# Patient Record
Sex: MALE | Race: WHITE | Employment: FULL TIME | URBAN - METROPOLITAN AREA
[De-identification: names, ages, dates, MRNs, and addresses within clinical notes are randomized per-mention and may not be internally consistent; named-entity substitution may affect disease eponyms.]

---

## 2017-02-02 ENCOUNTER — TELEPHONE (OUTPATIENT)
Dept: FAMILY MEDICINE CLINIC | Facility: CLINIC | Age: 42
End: 2017-02-02

## 2017-02-02 DIAGNOSIS — G47.33 OSA ON CPAP: Primary | ICD-10-CM

## 2017-02-02 DIAGNOSIS — Z99.89 OSA ON CPAP: Primary | ICD-10-CM

## 2017-02-16 ENCOUNTER — TELEPHONE (OUTPATIENT)
Dept: FAMILY MEDICINE CLINIC | Facility: CLINIC | Age: 42
End: 2017-02-16

## 2017-02-16 NOTE — TELEPHONE ENCOUNTER
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

## 2017-02-17 NOTE — TELEPHONE ENCOUNTER
Spoke with Jamin Scherer, she reports that she an pt have just moved into a new house and it is very dry, pt with red , dry areas on legs. Advised used of Aquaphor to areas, use humidifier in house.  Shower only once a day and use mild soap and warm, not hot clarita

## 2017-02-20 ENCOUNTER — APPOINTMENT (OUTPATIENT)
Dept: LAB | Age: 42
End: 2017-02-20
Attending: FAMILY MEDICINE
Payer: COMMERCIAL

## 2017-02-20 DIAGNOSIS — E03.9 HYPOTHYROIDISM (ACQUIRED): ICD-10-CM

## 2017-02-20 LAB
FREE T4: 1.2 NG/DL (ref 0.9–1.8)
TSI SER-ACNC: 1.93 MIU/ML (ref 0.35–5.5)

## 2017-02-20 PROCEDURE — 84443 ASSAY THYROID STIM HORMONE: CPT

## 2017-02-20 PROCEDURE — 84439 ASSAY OF FREE THYROXINE: CPT

## 2017-02-20 PROCEDURE — 36415 COLL VENOUS BLD VENIPUNCTURE: CPT

## 2017-03-06 ENCOUNTER — OFFICE VISIT (OUTPATIENT)
Dept: FAMILY MEDICINE CLINIC | Facility: CLINIC | Age: 42
End: 2017-03-06

## 2017-03-06 VITALS
DIASTOLIC BLOOD PRESSURE: 50 MMHG | SYSTOLIC BLOOD PRESSURE: 116 MMHG | HEART RATE: 79 BPM | BODY MASS INDEX: 37 KG/M2 | OXYGEN SATURATION: 98 % | RESPIRATION RATE: 16 BRPM | WEIGHT: 277 LBS | TEMPERATURE: 98 F

## 2017-03-06 DIAGNOSIS — M54.50 ACUTE BILATERAL LOW BACK PAIN WITHOUT SCIATICA: Primary | ICD-10-CM

## 2017-03-06 DIAGNOSIS — IMO0001 OBESITY, CLASS II, BMI 35-39.9, WITH COMORBIDITY: ICD-10-CM

## 2017-03-06 PROCEDURE — 99214 OFFICE O/P EST MOD 30 MIN: CPT | Performed by: FAMILY MEDICINE

## 2017-03-06 RX ORDER — CYCLOBENZAPRINE HCL 10 MG
10 TABLET ORAL NIGHTLY PRN
Qty: 14 TABLET | Refills: 0 | Status: SHIPPED | OUTPATIENT
Start: 2017-03-06 | End: 2019-04-01 | Stop reason: ALTCHOICE

## 2017-03-06 RX ORDER — METHYLPREDNISOLONE 4 MG/1
TABLET ORAL
Qty: 21 TABLET | Refills: 0 | Status: SHIPPED | OUTPATIENT
Start: 2017-03-06 | End: 2017-09-25 | Stop reason: ALTCHOICE

## 2017-03-06 RX ORDER — TRAMADOL HYDROCHLORIDE 50 MG/1
50 TABLET ORAL EVERY 6 HOURS PRN
Qty: 28 TABLET | Refills: 1 | Status: SHIPPED | OUTPATIENT
Start: 2017-03-06 | End: 2017-03-13

## 2017-03-06 NOTE — PROGRESS NOTES
CHIEF COMPLAINT: Patient presents with:  Back Pain: muscle spasms in back, pain while sitting     HPI:     Meri Pina is a 42year old male presents for evaluation of lumbar back pain that started 2 days ago while sitting at his computer.   Patient state Melanoma Maternal Uncle    • Melanoma Maternal Uncle    • Aneurysm[Other] [OTHER] Paternal Aunt      Cause of death   • Asthma Sister    • Prostate Cancer     • Uterine Cancer        Social History:   Smoking Status: Former Smoker                   Packs/D MCG/ACT Nasal Suspension 2 sprays by Each Nare route daily. Disp: 1 Bottle Rfl: 12   Nebulizer Does not apply Misc Use every 4 hours with albuterol as needed.  Disp #1DX J45.40 Disp: 1 each Rfl: 0       Allergies:    Dust                      Naprosyn [Napr daily  Recheck in 2 weeks if not better then we will recommend PT and possibly imaging  - Cyclobenzaprine HCl 10 MG Oral Tab; Take 1 tablet (10 mg total) by mouth nightly as needed for Muscle spasms. Dispense: 14 tablet;  Refill: 0  - methylPREDNISolone 4 11/13/1975  Diabetes Care Dilated Eye Exam due on 11/13/1974  Annual Physical due on 06/01/2016  Diabetes Care A1C due on 11/13/2016  LDL Control due on 11/13/2016  Asthma Control Test due on 12/17/2017  Asthma Action Plan due on 12/19/2017  Influenza Vacc

## 2017-03-06 NOTE — PATIENT INSTRUCTIONS
Use MY fitness pal shahnaz by underarmor (free one) and track all calores  -limit 70g carbohydrate daily  Limit 1800 calories daily  -Evaluated regular meals which are high in carbohydrate with poor nutrition and lack of vegetables  -Needs exercise 5 times wee Rx flexeril for muscle spasm at bedtime as needed for pain to help sleep. This medication causes sedation. No driving for 8 hours after ingestion. Do not operate machinery or drive or consume alcohol while taking this medication.    You may use ice or mois Standing hamstring stretch: Place the heel of your leg on a stool about 15 inches high. Keep your knee straight. Lean forward, bending at the hips until you feel a mild stretch in the back of your thigh.  Make sure you do not roll your shoulders and bend at Gluteal stretch: Lying on your back with both knees bent, rest the ankle of one leg over the knee of your other leg.  Grasp the thigh of the bottom leg and pull that knee toward your chest. You will feel a stretch along the buttocks and possibly along the o volleyball   handball   weight lifting   trampoline   tobogganing   sledding   snowmobiling   ice hockey. 4 Steps for Eating Healthier  Changing the way you eat can improve your health.  It can lower your cholesterol and blood pressure, and help you s · Boost your diet with whole grains. Go for oats, whole-grain rice, and bran. · Add beans and lentils to your meals. · Drink more water to match your fiber increase. This is to help prevent constipation.   Date Last Reviewed: 5/11/2015  © 4076-6390 The [address] Fact: This seems like it should be true, but it’s not. When you eat too few calories, your body acts as if it’s on a desert Brigid Deleon 1348. It thinks food is scarce, so it slows down your metabolism (how fast you burn calories) to save energy.  By eating too few ca A gram of fat has almost 2.5 times the calories of a gram of protein or carbohydrates. Try to balance your food choices so that only 20% to 35% of your calories comes from total fat.  This means an average of 2½ to 3½ grams of fat for each 100 calories you Excess weight is a major risk factor for heart disease. Losing weight has many benefits including lowering your blood pressure, improving your cholesterol level, and decreasing your risk for diseases such as diabetes and heart disease.  It may help keep you · Aim for 3 to 4 sessions of aerobic exercise a week. Each session should last about 40 minutes and include moderate to vigorous physical activity. · The most important part of the activity is that you break a sweat.  This indicates your heart is working h · Pizza with vegetable toppings and little or no cheese or low-fat cheese   Date Last Reviewed: 5/11/2015  © 2884-2548 The [facility], [address] [place] [address]. All rights reserved.  This information is not intended as a substitu Date Last Reviewed: 5/11/2015  © 5210-7094 The [address] [place] [address], [address] [place], [place] [address] Center[employer]aughn Jaeger. All rights reserved. This information is not intended as a substitute for professional medical care.  Always follow your healthcare professional

## 2017-05-09 ENCOUNTER — TELEPHONE (OUTPATIENT)
Dept: FAMILY MEDICINE CLINIC | Facility: CLINIC | Age: 42
End: 2017-05-09

## 2017-05-09 DIAGNOSIS — G47.33 OSA ON CPAP: Primary | ICD-10-CM

## 2017-05-09 DIAGNOSIS — Z99.89 OSA ON CPAP: Primary | ICD-10-CM

## 2017-05-19 ENCOUNTER — TELEPHONE (OUTPATIENT)
Dept: FAMILY MEDICINE CLINIC | Facility: CLINIC | Age: 42
End: 2017-05-19

## 2017-05-19 NOTE — TELEPHONE ENCOUNTER
Called patient and spoke to wife Alina Shanks as permitted on consent. Informed her that patient has not had labs completed for diabetes/prediabetes and patient needs OV. Patient's wife states patient will schedule in 1 month to come in for appt.      No future

## 2017-07-07 ENCOUNTER — PATIENT OUTREACH (OUTPATIENT)
Dept: FAMILY MEDICINE CLINIC | Facility: CLINIC | Age: 42
End: 2017-07-07

## 2017-09-25 ENCOUNTER — OFFICE VISIT (OUTPATIENT)
Dept: FAMILY MEDICINE CLINIC | Facility: CLINIC | Age: 42
End: 2017-09-25

## 2017-09-25 VITALS
HEART RATE: 76 BPM | TEMPERATURE: 98 F | OXYGEN SATURATION: 100 % | RESPIRATION RATE: 17 BRPM | DIASTOLIC BLOOD PRESSURE: 78 MMHG | BODY MASS INDEX: 37.94 KG/M2 | SYSTOLIC BLOOD PRESSURE: 138 MMHG | WEIGHT: 271 LBS | HEIGHT: 71 IN

## 2017-09-25 DIAGNOSIS — E11.69 DYSLIPIDEMIA WITH LOW HIGH DENSITY LIPOPROTEIN (HDL) CHOLESTEROL WITH HYPERTRIGLYCERIDEMIA DUE TO TYPE 2 DIABETES MELLITUS (HCC): ICD-10-CM

## 2017-09-25 DIAGNOSIS — E78.2 DYSLIPIDEMIA WITH LOW HIGH DENSITY LIPOPROTEIN (HDL) CHOLESTEROL WITH HYPERTRIGLYCERIDEMIA DUE TO TYPE 2 DIABETES MELLITUS (HCC): ICD-10-CM

## 2017-09-25 DIAGNOSIS — Z13.21 SCREENING FOR ENDOCRINE, NUTRITIONAL, METABOLIC AND IMMUNITY DISORDER: ICD-10-CM

## 2017-09-25 DIAGNOSIS — Z13.0 SCREENING FOR ENDOCRINE, NUTRITIONAL, METABOLIC AND IMMUNITY DISORDER: ICD-10-CM

## 2017-09-25 DIAGNOSIS — Z13.29 SCREENING FOR ENDOCRINE, NUTRITIONAL, METABOLIC AND IMMUNITY DISORDER: ICD-10-CM

## 2017-09-25 DIAGNOSIS — Z13.228 SCREENING FOR ENDOCRINE, NUTRITIONAL, METABOLIC AND IMMUNITY DISORDER: ICD-10-CM

## 2017-09-25 DIAGNOSIS — J45.40 MODERATE PERSISTENT ASTHMA WITHOUT COMPLICATION: ICD-10-CM

## 2017-09-25 DIAGNOSIS — Z13.0 SCREENING FOR IRON DEFICIENCY ANEMIA: Primary | ICD-10-CM

## 2017-09-25 DIAGNOSIS — Z00.00 ANNUAL PHYSICAL EXAM: ICD-10-CM

## 2017-09-25 PROCEDURE — 90471 IMMUNIZATION ADMIN: CPT | Performed by: FAMILY MEDICINE

## 2017-09-25 PROCEDURE — 99396 PREV VISIT EST AGE 40-64: CPT | Performed by: FAMILY MEDICINE

## 2017-09-25 PROCEDURE — 90686 IIV4 VACC NO PRSV 0.5 ML IM: CPT | Performed by: FAMILY MEDICINE

## 2017-09-25 RX ORDER — CETIRIZINE HYDROCHLORIDE 10 MG/1
10 TABLET ORAL DAILY
Qty: 90 TABLET | Refills: 4 | Status: SHIPPED | OUTPATIENT
Start: 2017-09-25 | End: 2018-09-25

## 2017-09-25 RX ORDER — ALBUTEROL SULFATE 90 UG/1
AEROSOL, METERED RESPIRATORY (INHALATION)
Qty: 25.5 G | Refills: 2 | Status: SHIPPED | OUTPATIENT
Start: 2017-09-25 | End: 2017-10-20

## 2017-09-25 NOTE — PATIENT INSTRUCTIONS
Follow-up in 1 month to recheck asthma and discuss lab work. Outside diagnosis from other medical institution shows diagnosis of diabetes.   Perform labs fasting 8 hours with water or black coffee or or black tea diet  soda only prior to exam.    -Encourag Inside the lungs there are branching airways made of stretchy tissue. Each airway is wrapped with bands of muscle. The airways are more narrow as they go deeper into the lungs. The smallest airways end in clusters of tiny balloon-like air sacs (alveoli).  Margie Hansen · Coughing, especially at night  · Getting tired or out of breath easily  · Wheezing  · Chest tightness  · Faster breathing when at rest  Severe flare-ups   Severe flare-ups are life-threatening. They can cause brain damage or death.  In a severe flare-up, Triggers irritate your lungs and lead to asthma flare-ups. Some examples are:  · Irritants, such as tobacco smoke or air pollution. These are a concern for all people with asthma.   · Allergens or substances that cause allergies, like pets, dust mites, or p Exposing a person to gradually larger amounts of an allergen can help the body build up a tolerance. This is the purpose of allergy shots (immunotherapy). For this therapy, injections are given over a period of years.  At first, you get injections with tino mills Cheddar cheese   205 mg/1 oz.   Navy beans, cooked   79 mg/1/2 cup   Kale   90 mg/1/2 cup   Ice cream strawberry   79 mg/1/2 cup           Orange, navel   56 mg/1 medium   Note: Calcium levels may vary depending on brand and size.   Daily calcium needs  14- What is this test?  Vitamin D is mainly found in fortified dairy foods, juice, breakfast cereal, and certain fish. This vitamin plays many roles in the body.  But because it helps the body absorb calcium from foods and supplements, it's particularly importa A result for a lab test may be affected by many things, including the method the laboratory uses to do the test. If your test results are different from the normal value, you may not have a problem.  To learn what the results mean for you, talk with your he Date Last Reviewed: 10/12/2015  © 2501-1391 The [address] [place] [address], [address] [place], [place] [address] Jaeger. All rights reserved. This information is not intended as a substitute for professional medical care.  Always follow your healthcare [employer] All men in this age group Every 2 to 4 years if no risk factors for eye disease2   Vaccine Who needs it How often   Chickenpox (varicella) All men in this age group who have no record of this infection or vaccine 2 doses; the second dose should be g Use of daily aspirin Men ages 39 to 78 at risk for cardiovascular health problems At routine exams   Use of tobacco and the health affects it can cause All men in this age group Every exam   1National Comprehensive Cancer [facility]

## 2017-09-25 NOTE — PROGRESS NOTES
REASON FOR VISIT:    Suyapa Bowman is a 42year old male who presents for an [address]. Using albuterol 2 x week needs albuterol with dust exposure due to cough and chest tightness with relief.  Denies cough, chest tightness, sob, wheezi the lungs every 6 (six) hours as needed for Wheezing or Shortness of Breath (If no relief go to ER). Disp: 18 g Rfl: 0   Montelukast Sodium (SINGULAIR) 10 MG Oral Tab Take 1 tablet (10 mg total) by mouth nightly.  Disp: 90 tablet Rfl: 2   Fluticasone [facility] maintain positive mental well-being?: Social Interaction    How would you describe your daily physical activity?: Light    If you are a male age 38-65 or a female age 47-67, do you take aspirin?: No    Have you had any immunizations at another office such GLUCOSE (mg/dL)   Date Value   11/13/2015 80         Gonorrhea Screening if high risk No results found for: GONOCOCCUS    HIV Screening For all adults age 22-65, older adults at increased risk No results found for: HIV    Syphilis Screening Screen if p total) by mouth once daily. Disp: 90 tablet Rfl: 1   Levothyroxine Sodium 200 MCG Oral Tab Take 1 tablet (200 mcg total) by mouth once daily.  Disp: 90 tablet Rfl: 1   Albuterol Sulfate  (90 BASE) MCG/ACT Inhalation Aero Soln Inhale 2 puffs into the Melanoma Maternal Grandmother    • Heart Disease Maternal Grandfather    • Melanoma Maternal Uncle    • Melanoma Maternal Uncle    • Melanoma Maternal Uncle    • Aneurysm [OTHER] Paternal Aunt      Cause of death   • Asthma Sister    • Prostate Cancer Othe daughter in room.    RECTAL:declined  MUSCULOSKELETAL: back is not tender, FROM of the back  EXTREMITIES: no cyanosis, clubbing or edema  NEURO: Oriented times three, cranial nerves are intact, motor and sensory are grossly intact    ASSESSMENT AND OTHER RE  or unmarried couples  - UTD immunizations -titers MMR  -Vitamin D3 2000 units daily recommended  -Needs 1000 mg of calcium daily for osteoporosis prevention discussed  - Albuterol Sulfate HFA (PROAIR HFA) 108 (90 Base) MCG/ACT Inhalation Aero Soln;

## 2017-10-01 DIAGNOSIS — E03.9 HYPOTHYROIDISM (ACQUIRED): ICD-10-CM

## 2017-10-02 RX ORDER — LEVOTHYROXINE SODIUM 0.05 MG/1
TABLET ORAL
Qty: 90 TABLET | Refills: 0 | Status: SHIPPED | OUTPATIENT
Start: 2017-10-02 | End: 2017-10-20

## 2017-10-02 NOTE — TELEPHONE ENCOUNTER
Pt requesting refill on Levothyroxine, protocol passed, refill approved    LOV 9/25/17    LF 2/20/17  #90 w/1    Future Appointments  Date Time Provider Caroline Evans   10/23/2017 4:40 PM Edinson Mckeon DO [facility] [place]

## 2017-10-06 DIAGNOSIS — J45.40 MODERATE PERSISTENT ASTHMA WITHOUT COMPLICATION: ICD-10-CM

## 2017-10-06 DIAGNOSIS — J30.1 ALLERGIC RHINITIS DUE TO POLLEN: ICD-10-CM

## 2017-10-09 RX ORDER — MONTELUKAST SODIUM 10 MG/1
TABLET ORAL
Qty: 90 TABLET | Refills: 0 | Status: SHIPPED | OUTPATIENT
Start: 2017-10-09 | End: 2017-10-20

## 2017-10-20 ENCOUNTER — TELEPHONE (OUTPATIENT)
Dept: FAMILY MEDICINE CLINIC | Facility: CLINIC | Age: 42
End: 2017-10-20

## 2017-10-20 DIAGNOSIS — J45.40 MODERATE PERSISTENT ASTHMA WITHOUT COMPLICATION: ICD-10-CM

## 2017-10-20 DIAGNOSIS — E03.9 HYPOTHYROIDISM (ACQUIRED): ICD-10-CM

## 2017-10-20 DIAGNOSIS — J30.1 ALLERGIC RHINITIS DUE TO POLLEN, UNSPECIFIED CHRONICITY, UNSPECIFIED SEASONALITY: ICD-10-CM

## 2017-10-20 DIAGNOSIS — Z00.00 ANNUAL PHYSICAL EXAM: ICD-10-CM

## 2017-10-20 RX ORDER — LEVOTHYROXINE SODIUM 0.05 MG/1
50 TABLET ORAL
Qty: 90 TABLET | Refills: 0 | Status: SHIPPED | OUTPATIENT
Start: 2017-10-20 | End: 2018-02-20

## 2017-10-20 RX ORDER — MONTELUKAST SODIUM 10 MG/1
TABLET ORAL
Qty: 90 TABLET | Refills: 0 | Status: SHIPPED | OUTPATIENT
Start: 2017-10-20 | End: 2018-05-23

## 2017-10-20 RX ORDER — LEVOTHYROXINE SODIUM 0.2 MG/1
200 TABLET ORAL
Qty: 90 TABLET | Refills: 0 | Status: SHIPPED | OUTPATIENT
Start: 2017-10-20 | End: 2018-02-20

## 2017-10-20 RX ORDER — ALBUTEROL SULFATE 90 UG/1
AEROSOL, METERED RESPIRATORY (INHALATION)
Qty: 25.5 G | Refills: 2 | Status: SHIPPED | OUTPATIENT
Start: 2017-10-20 | End: 2018-10-09

## 2017-10-20 NOTE — TELEPHONE ENCOUNTER
Spoke with Wife John Boswell regarding having labs done, pt will complete before next scheduled appointment    Wife also requests [facility]    Refills pended

## 2017-10-20 NOTE — TELEPHONE ENCOUNTER
Eduardo Shown from [facility] calling, wants to confirm patiens thyroid dose. OV from 9/19/2016 has pt on 250 mcg of levothyroxine daily.      Future Appointments  Date Time Provider Caroline Evans   11/13/2017 4:40 PM Chad Feliciano, DO [facility] [place]

## 2017-10-23 ENCOUNTER — TELEPHONE (OUTPATIENT)
Dept: FAMILY MEDICINE CLINIC | Facility: CLINIC | Age: 42
End: 2017-10-23

## 2017-10-23 DIAGNOSIS — G47.33 OSA ON CPAP: Primary | ICD-10-CM

## 2017-10-23 DIAGNOSIS — J45.40 MODERATE PERSISTENT ASTHMA WITHOUT COMPLICATION: ICD-10-CM

## 2017-10-23 DIAGNOSIS — Z99.89 OSA ON CPAP: Primary | ICD-10-CM

## 2017-10-23 NOTE — TELEPHONE ENCOUNTER
Obstructive sleep apnea and needs consultation and review of his CPAP. Referral improved. Seen recently with asthma.

## 2017-11-04 ENCOUNTER — LAB ENCOUNTER (OUTPATIENT)
Dept: LAB | Facility: HOSPITAL | Age: 42
End: 2017-11-04
Attending: FAMILY MEDICINE
Payer: COMMERCIAL

## 2017-11-04 DIAGNOSIS — Z00.00 ANNUAL PHYSICAL EXAM: ICD-10-CM

## 2017-11-04 DIAGNOSIS — Z13.0 SCREENING FOR ENDOCRINE, NUTRITIONAL, METABOLIC AND IMMUNITY DISORDER: ICD-10-CM

## 2017-11-04 DIAGNOSIS — E03.9 HYPOTHYROIDISM (ACQUIRED): ICD-10-CM

## 2017-11-04 DIAGNOSIS — E78.2 DYSLIPIDEMIA WITH LOW HIGH DENSITY LIPOPROTEIN (HDL) CHOLESTEROL WITH HYPERTRIGLYCERIDEMIA DUE TO TYPE 2 DIABETES MELLITUS (HCC): ICD-10-CM

## 2017-11-04 DIAGNOSIS — Z13.0 SCREENING FOR IRON DEFICIENCY ANEMIA: ICD-10-CM

## 2017-11-04 DIAGNOSIS — Z13.29 SCREENING FOR ENDOCRINE, NUTRITIONAL, METABOLIC AND IMMUNITY DISORDER: ICD-10-CM

## 2017-11-04 DIAGNOSIS — Z13.228 SCREENING FOR ENDOCRINE, NUTRITIONAL, METABOLIC AND IMMUNITY DISORDER: ICD-10-CM

## 2017-11-04 DIAGNOSIS — E11.69 DYSLIPIDEMIA WITH LOW HIGH DENSITY LIPOPROTEIN (HDL) CHOLESTEROL WITH HYPERTRIGLYCERIDEMIA DUE TO TYPE 2 DIABETES MELLITUS (HCC): ICD-10-CM

## 2017-11-04 DIAGNOSIS — Z13.21 SCREENING FOR ENDOCRINE, NUTRITIONAL, METABOLIC AND IMMUNITY DISORDER: ICD-10-CM

## 2017-11-04 PROCEDURE — 86762 RUBELLA ANTIBODY: CPT

## 2017-11-04 PROCEDURE — 36415 COLL VENOUS BLD VENIPUNCTURE: CPT

## 2017-11-04 PROCEDURE — 84443 ASSAY THYROID STIM HORMONE: CPT

## 2017-11-04 PROCEDURE — 86787 VARICELLA-ZOSTER ANTIBODY: CPT

## 2017-11-04 PROCEDURE — 82306 VITAMIN D 25 HYDROXY: CPT

## 2017-11-04 PROCEDURE — 84439 ASSAY OF FREE THYROXINE: CPT

## 2017-11-04 PROCEDURE — 86765 RUBEOLA ANTIBODY: CPT

## 2017-11-04 PROCEDURE — 85025 COMPLETE CBC W/AUTO DIFF WBC: CPT

## 2017-11-04 PROCEDURE — 80053 COMPREHEN METABOLIC PANEL: CPT

## 2017-11-04 PROCEDURE — 86735 MUMPS ANTIBODY: CPT

## 2017-11-04 PROCEDURE — 83036 HEMOGLOBIN GLYCOSYLATED A1C: CPT

## 2017-11-04 PROCEDURE — 80061 LIPID PANEL: CPT

## 2017-11-09 NOTE — PROGRESS NOTES
Immune to mumps, measles, and rubella and chicken pox. notified mychart. Fu OV as directed at last visit.

## 2017-11-13 ENCOUNTER — OFFICE VISIT (OUTPATIENT)
Dept: FAMILY MEDICINE CLINIC | Facility: CLINIC | Age: 42
End: 2017-11-13

## 2017-11-13 VITALS
HEART RATE: 66 BPM | WEIGHT: 276.38 LBS | BODY MASS INDEX: 39 KG/M2 | OXYGEN SATURATION: 99 % | RESPIRATION RATE: 18 BRPM | DIASTOLIC BLOOD PRESSURE: 72 MMHG | SYSTOLIC BLOOD PRESSURE: 124 MMHG | TEMPERATURE: 98 F

## 2017-11-13 DIAGNOSIS — E55.9 VITAMIN D DEFICIENCY: ICD-10-CM

## 2017-11-13 DIAGNOSIS — E78.2 MIXED HYPERLIPIDEMIA: Primary | ICD-10-CM

## 2017-11-13 DIAGNOSIS — R73.01 IFG (IMPAIRED FASTING GLUCOSE): ICD-10-CM

## 2017-11-13 DIAGNOSIS — J45.40 MODERATE PERSISTENT ASTHMA WITHOUT COMPLICATION: ICD-10-CM

## 2017-11-13 DIAGNOSIS — E03.9 HYPOTHYROIDISM (ACQUIRED): ICD-10-CM

## 2017-11-13 PROCEDURE — 99214 OFFICE O/P EST MOD 30 MIN: CPT | Performed by: FAMILY MEDICINE

## 2017-11-13 RX ORDER — ATORVASTATIN CALCIUM 10 MG/1
10 TABLET, FILM COATED ORAL NIGHTLY
Qty: 30 TABLET | Refills: 2 | Status: ON HOLD | OUTPATIENT
Start: 2017-11-13 | End: 2018-08-12 | Stop reason: ALTCHOICE

## 2017-11-13 NOTE — PATIENT INSTRUCTIONS
-Buy over-the-counter vitamin D3  2000 IUs take 2 tablets daily ×12 weeks then 1 tablet daily. Recheck in12 months. Paco's- [employer] brand 600 tabs for $12  Lipid panel is  Elevated. - Please change diet  start exercising.   Start exercise to 3-5 times a w · HDL (high-density lipoprotein) is known as \"good cholesterol. \" This protein shell collects excess cholesterol that LDLs have left behind on blood vessel walls.  That's why high levels of HDL cholesterol can decrease your risk of heart disease and stroke Take medicine as directed  Many people need medicine to get their LDL levels to a safe level. Medicine to lower cholesterol levels is effective and safe. Taking medicine is not a substitute for exercise or watching your diet!  Your healthcare provider can t · Cream, ice cream, sour cream, cheese, and butter, and foods made with them  · Sauces made with butter or cream  · Salad dressings with saturated fats  · Foods that contain palm or coconut oil  Choose unsaturated fats  Unsaturated fats are usually liquid Luckily, most foods now have labels giving you the facts about what you’re eating. Reading food labels helps you make healthy choices. Look for the words highlighted below. · Serving Size. This is the amount of food in 1 serving.  If you eat larger portion If you haven’t been exercising regularly,  get your doctor’s OK first. Then start slowly.  Here are some tips:  · Begin exercising 3 times a week for 5 to 10 minutes at a time. · When you feel comfortable, add a few minutes each session.   · Slowly build u

## 2017-11-14 ENCOUNTER — TELEPHONE (OUTPATIENT)
Dept: FAMILY MEDICINE CLINIC | Facility: CLINIC | Age: 42
End: 2017-11-14

## 2017-11-14 NOTE — TELEPHONE ENCOUNTER
Pts wife called wanting to speak to nurse in regards to husbands medication. Jazzyphilomena Cantu Stated that she is concerned with the medication prescribed for pts cholesterol, she wants to speak to nurse to further discuss alternatives.     [facility] [phone]

## 2017-11-14 NOTE — TELEPHONE ENCOUNTER
Spoke with wife, She is concerned about her  taking a statin. Discussed benefits and side effects with wife. Wife states that pt may try diet and exercise before starting medication. Advised wife to have pt follow up as directed.  Wife agrees to this

## 2017-11-17 ENCOUNTER — TELEPHONE (OUTPATIENT)
Dept: FAMILY MEDICINE CLINIC | Facility: CLINIC | Age: 42
End: 2017-11-17

## 2017-11-17 NOTE — TELEPHONE ENCOUNTER
Patient signed medical records authorization form for the below Facility to disclose health information to [facility]:     Facility / Provider Name: [facility] Address:  Facility Phone: [phone] Fax: [phone]      Medical Records A

## 2018-02-20 DIAGNOSIS — E03.9 HYPOTHYROIDISM (ACQUIRED): ICD-10-CM

## 2018-02-20 RX ORDER — LEVOTHYROXINE SODIUM 0.05 MG/1
50 TABLET ORAL
Qty: 90 TABLET | Refills: 0 | Status: SHIPPED | OUTPATIENT
Start: 2018-02-20 | End: 2018-07-03

## 2018-02-20 RX ORDER — LEVOTHYROXINE SODIUM 0.2 MG/1
TABLET ORAL
Qty: 90 TABLET | Refills: 0 | Status: SHIPPED | OUTPATIENT
Start: 2018-02-20 | End: 2018-07-03

## 2018-03-26 ENCOUNTER — TELEPHONE (OUTPATIENT)
Dept: FAMILY MEDICINE CLINIC | Facility: CLINIC | Age: 43
End: 2018-03-26

## 2018-03-26 NOTE — TELEPHONE ENCOUNTER
Spoke with wife Delia Sadleree. Instructed her to obtain RX for Nebulizer from [place] or may purchase at Pharmacy.  Delia Araya verbalized understanding

## 2018-05-23 DIAGNOSIS — J45.40 MODERATE PERSISTENT ASTHMA WITHOUT COMPLICATION: ICD-10-CM

## 2018-05-23 DIAGNOSIS — J30.1 ALLERGIC RHINITIS DUE TO POLLEN: ICD-10-CM

## 2018-05-23 RX ORDER — MONTELUKAST SODIUM 10 MG/1
TABLET ORAL
Qty: 90 TABLET | Refills: 0 | Status: SHIPPED | OUTPATIENT
Start: 2018-05-23 | End: 2018-10-09

## 2018-05-23 NOTE — TELEPHONE ENCOUNTER
Pt requesting refill of Montelukast, protocol failed, unable to approve:     Last Time Medication was Filled:  10/20/17  Last Office Visit with PCP: 11/13/17    No future appointments.

## 2018-05-25 NOTE — PROGRESS NOTES
No evidence impaired Fasting glucose. Prior blood glucose level was nonfasting and patient had drank lemonade just prior to the study. Normal A1c for past several years.   Will remove from diabetic list//registry

## 2018-06-06 ENCOUNTER — TELEPHONE (OUTPATIENT)
Dept: FAMILY MEDICINE CLINIC | Facility: CLINIC | Age: 43
End: 2018-06-06

## 2018-06-06 DIAGNOSIS — L91.8 SKIN TAG: Primary | ICD-10-CM

## 2018-07-03 DIAGNOSIS — E03.9 HYPOTHYROIDISM (ACQUIRED): ICD-10-CM

## 2018-07-03 RX ORDER — LEVOTHYROXINE SODIUM 0.2 MG/1
TABLET ORAL
Qty: 90 TABLET | Refills: 0 | Status: SHIPPED | OUTPATIENT
Start: 2018-07-03 | End: 2018-10-09

## 2018-07-03 RX ORDER — LEVOTHYROXINE SODIUM 0.05 MG/1
50 TABLET ORAL
Qty: 90 TABLET | Refills: 0 | Status: SHIPPED | OUTPATIENT
Start: 2018-07-03 | End: 2018-10-09

## 2018-07-03 NOTE — TELEPHONE ENCOUNTER
Pt requesting refill on Levothyroxine, protocol passed, refill approved    LOV 11/13/17    LF 2/20/18    Future Appointments  Date Time Provider Caroline Evans   7/21/2018 8:00 AM Timbo Paul MD [facility]

## 2018-08-12 ENCOUNTER — HOSPITAL ENCOUNTER (OUTPATIENT)
Facility: HOSPITAL | Age: 43
Setting detail: OBSERVATION
Discharge: HOME | End: 2018-08-13
Attending: EMERGENCY MEDICINE | Admitting: HOSPITALIST
Payer: COMMERCIAL

## 2018-08-12 ENCOUNTER — APPOINTMENT (OUTPATIENT)
Dept: CT IMAGING | Facility: HOSPITAL | Age: 43
End: 2018-08-12
Attending: EMERGENCY MEDICINE
Payer: COMMERCIAL

## 2018-08-12 DIAGNOSIS — N23 RENAL COLIC: Primary | ICD-10-CM

## 2018-08-12 LAB
ALBUMIN SERPL-MCNC: 4 G/DL (ref 3.5–4.8)
ALBUMIN/GLOB SERPL: 1 {RATIO} (ref 1–2)
ALP LIVER SERPL-CCNC: 94 U/L (ref 45–117)
ALT SERPL-CCNC: 43 U/L (ref 17–63)
ANION GAP SERPL CALC-SCNC: 6 MMOL/L (ref 0–18)
BASOPHILS # BLD AUTO: 0.07 X10(3) UL (ref 0–0.1)
BASOPHILS NFR BLD AUTO: 0.7 %
BILIRUB SERPL-MCNC: 0.5 MG/DL (ref 0.1–2)
BILIRUB UR QL STRIP.AUTO: NEGATIVE
BUN BLD-MCNC: 19 MG/DL (ref 8–20)
BUN/CREAT SERPL: 15.2 (ref 10–20)
CALCIUM BLD-MCNC: 8.8 MG/DL (ref 8.3–10.3)
CHLORIDE SERPL-SCNC: 110 MMOL/L (ref 101–111)
CO2 SERPL-SCNC: 22 MMOL/L (ref 22–32)
COLOR UR AUTO: YELLOW
CREAT BLD-MCNC: 1.25 MG/DL (ref 0.7–1.3)
EOSINOPHIL # BLD AUTO: 0.46 X10(3) UL (ref 0–0.3)
EOSINOPHIL NFR BLD AUTO: 4.4 %
ERYTHROCYTE [DISTWIDTH] IN BLOOD BY AUTOMATED COUNT: 13.3 % (ref 11.5–16)
GLOBULIN PLAS-MCNC: 4 G/DL (ref 2.5–3.7)
GLUCOSE BLD-MCNC: 104 MG/DL (ref 70–99)
GLUCOSE UR STRIP.AUTO-MCNC: NEGATIVE MG/DL
HCT VFR BLD AUTO: 48.4 % (ref 37–53)
HGB BLD-MCNC: 16.9 G/DL (ref 13–17)
IMMATURE GRANULOCYTE COUNT: 0.07 X10(3) UL (ref 0–1)
IMMATURE GRANULOCYTE RATIO %: 0.7 %
KETONES UR STRIP.AUTO-MCNC: NEGATIVE MG/DL
LIPASE: 158 U/L (ref 73–393)
LYMPHOCYTES # BLD AUTO: 3.8 X10(3) UL (ref 0.9–4)
LYMPHOCYTES NFR BLD AUTO: 36.2 %
M PROTEIN MFR SERPL ELPH: 8 G/DL (ref 6.1–8.3)
MCH RBC QN AUTO: 30 PG (ref 27–33.2)
MCHC RBC AUTO-ENTMCNC: 34.9 G/DL (ref 31–37)
MCV RBC AUTO: 86 FL (ref 80–99)
MONOCYTES # BLD AUTO: 0.98 X10(3) UL (ref 0.1–1)
MONOCYTES NFR BLD AUTO: 9.3 %
NEUTROPHIL ABS PRELIM: 5.13 X10 (3) UL (ref 1.3–6.7)
NEUTROPHILS # BLD AUTO: 5.13 X10(3) UL (ref 1.3–6.7)
NEUTROPHILS NFR BLD AUTO: 48.7 %
NITRITE UR QL STRIP.AUTO: NEGATIVE
OSMOLALITY SERPL CALC.SUM OF ELEC: 289 MOSM/KG (ref 275–295)
PH UR STRIP.AUTO: 5 [PH] (ref 4.5–8)
PLATELET # BLD AUTO: 227 10(3)UL (ref 150–450)
PROT UR STRIP.AUTO-MCNC: 30 MG/DL
RBC # BLD AUTO: 5.63 X10(6)UL (ref 4.3–5.7)
RBC #/AREA URNS AUTO: >10 /HPF
RED CELL DISTRIBUTION WIDTH-SD: 41.3 FL (ref 35.1–46.3)
SODIUM SERPL-SCNC: 138 MMOL/L (ref 136–144)
SP GR UR STRIP.AUTO: 1.02 (ref 1–1.03)
UROBILINOGEN UR STRIP.AUTO-MCNC: <2 MG/DL
WBC # BLD AUTO: 10.5 X10(3) UL (ref 4–13)

## 2018-08-12 PROCEDURE — 99220 INITIAL OBSERVATION CARE,LEVL III: CPT | Performed by: HOSPITALIST

## 2018-08-12 PROCEDURE — 74176 CT ABD & PELVIS W/O CONTRAST: CPT | Performed by: EMERGENCY MEDICINE

## 2018-08-12 RX ORDER — ONDANSETRON 2 MG/ML
4 INJECTION INTRAMUSCULAR; INTRAVENOUS EVERY 6 HOURS PRN
Status: DISCONTINUED | OUTPATIENT
Start: 2018-08-12 | End: 2018-08-13

## 2018-08-12 RX ORDER — LEVOTHYROXINE SODIUM 0.05 MG/1
50 TABLET ORAL
Status: DISCONTINUED | OUTPATIENT
Start: 2018-08-12 | End: 2018-08-13

## 2018-08-12 RX ORDER — CETIRIZINE HYDROCHLORIDE 10 MG/1
10 TABLET ORAL DAILY
Status: DISCONTINUED | OUTPATIENT
Start: 2018-08-12 | End: 2018-08-13

## 2018-08-12 RX ORDER — ACETAMINOPHEN 325 MG/1
650 TABLET ORAL EVERY 4 HOURS PRN
Status: DISCONTINUED | OUTPATIENT
Start: 2018-08-12 | End: 2018-08-13

## 2018-08-12 RX ORDER — CYCLOBENZAPRINE HCL 10 MG
10 TABLET ORAL NIGHTLY PRN
Status: DISCONTINUED | OUTPATIENT
Start: 2018-08-12 | End: 2018-08-13

## 2018-08-12 RX ORDER — SODIUM CHLORIDE 9 MG/ML
INJECTION, SOLUTION INTRAVENOUS CONTINUOUS
Status: DISCONTINUED | OUTPATIENT
Start: 2018-08-12 | End: 2018-08-13

## 2018-08-12 RX ORDER — ALBUTEROL SULFATE 90 UG/1
2 AEROSOL, METERED RESPIRATORY (INHALATION) EVERY 4 HOURS PRN
Status: DISCONTINUED | OUTPATIENT
Start: 2018-08-12 | End: 2018-08-13

## 2018-08-12 RX ORDER — MORPHINE SULFATE 4 MG/ML
4 INJECTION, SOLUTION INTRAMUSCULAR; INTRAVENOUS ONCE
Status: COMPLETED | OUTPATIENT
Start: 2018-08-12 | End: 2018-08-12

## 2018-08-12 RX ORDER — ONDANSETRON 2 MG/ML
4 INJECTION INTRAMUSCULAR; INTRAVENOUS ONCE
Status: COMPLETED | OUTPATIENT
Start: 2018-08-12 | End: 2018-08-12

## 2018-08-12 RX ORDER — ONDANSETRON 2 MG/ML
4 INJECTION INTRAMUSCULAR; INTRAVENOUS ONCE
Status: DISCONTINUED | OUTPATIENT
Start: 2018-08-12 | End: 2018-08-13

## 2018-08-12 RX ORDER — MORPHINE SULFATE 4 MG/ML
4 INJECTION, SOLUTION INTRAMUSCULAR; INTRAVENOUS EVERY 2 HOUR PRN
Status: DISCONTINUED | OUTPATIENT
Start: 2018-08-12 | End: 2018-08-13

## 2018-08-12 RX ORDER — ACETAMINOPHEN 325 MG/1
650 TABLET ORAL EVERY 6 HOURS PRN
Status: DISCONTINUED | OUTPATIENT
Start: 2018-08-12 | End: 2018-08-13

## 2018-08-12 RX ORDER — KETOROLAC TROMETHAMINE 30 MG/ML
15 INJECTION, SOLUTION INTRAMUSCULAR; INTRAVENOUS ONCE
Status: COMPLETED | OUTPATIENT
Start: 2018-08-12 | End: 2018-08-12

## 2018-08-12 RX ORDER — HYDROCODONE BITARTRATE AND ACETAMINOPHEN 5; 325 MG/1; MG/1
1 TABLET ORAL EVERY 4 HOURS PRN
Status: DISCONTINUED | OUTPATIENT
Start: 2018-08-12 | End: 2018-08-13

## 2018-08-12 RX ORDER — LEVOTHYROXINE SODIUM 0.2 MG/1
200 TABLET ORAL
Status: DISCONTINUED | OUTPATIENT
Start: 2018-08-12 | End: 2018-08-13

## 2018-08-12 RX ORDER — MORPHINE SULFATE 4 MG/ML
2 INJECTION, SOLUTION INTRAMUSCULAR; INTRAVENOUS EVERY 2 HOUR PRN
Status: DISCONTINUED | OUTPATIENT
Start: 2018-08-12 | End: 2018-08-13

## 2018-08-12 RX ORDER — MORPHINE SULFATE 4 MG/ML
INJECTION, SOLUTION INTRAMUSCULAR; INTRAVENOUS
Status: DISPENSED
Start: 2018-08-12 | End: 2018-08-13

## 2018-08-12 RX ORDER — MORPHINE SULFATE 4 MG/ML
INJECTION, SOLUTION INTRAMUSCULAR; INTRAVENOUS
Status: COMPLETED
Start: 2018-08-12 | End: 2018-08-12

## 2018-08-12 RX ORDER — ALFUZOSIN HYDROCHLORIDE 10 MG/1
10 TABLET, EXTENDED RELEASE ORAL
Status: DISCONTINUED | OUTPATIENT
Start: 2018-08-13 | End: 2018-08-13

## 2018-08-12 RX ORDER — MORPHINE SULFATE 4 MG/ML
INJECTION, SOLUTION INTRAMUSCULAR; INTRAVENOUS
Status: DISPENSED
Start: 2018-08-12 | End: 2018-08-12

## 2018-08-12 RX ORDER — MONTELUKAST SODIUM 10 MG/1
10 TABLET ORAL NIGHTLY
Status: DISCONTINUED | OUTPATIENT
Start: 2018-08-12 | End: 2018-08-13

## 2018-08-12 RX ORDER — MORPHINE SULFATE 4 MG/ML
6 INJECTION, SOLUTION INTRAMUSCULAR; INTRAVENOUS ONCE
Status: COMPLETED | OUTPATIENT
Start: 2018-08-12 | End: 2018-08-12

## 2018-08-12 RX ORDER — MORPHINE SULFATE 4 MG/ML
1 INJECTION, SOLUTION INTRAMUSCULAR; INTRAVENOUS EVERY 2 HOUR PRN
Status: DISCONTINUED | OUTPATIENT
Start: 2018-08-12 | End: 2018-08-13

## 2018-08-12 RX ORDER — CALCIUM CARBONATE/VITAMIN D3 600 MG-10
1 TABLET ORAL DAILY
Status: DISCONTINUED | OUTPATIENT
Start: 2018-08-12 | End: 2018-08-12 | Stop reason: RX

## 2018-08-12 RX ORDER — ATORVASTATIN CALCIUM 10 MG/1
10 TABLET, FILM COATED ORAL NIGHTLY
Status: DISCONTINUED | OUTPATIENT
Start: 2018-08-12 | End: 2018-08-13

## 2018-08-12 RX ORDER — ONDANSETRON 2 MG/ML
INJECTION INTRAMUSCULAR; INTRAVENOUS
Status: COMPLETED
Start: 2018-08-12 | End: 2018-08-12

## 2018-08-12 RX ORDER — HYDROMORPHONE HYDROCHLORIDE 1 MG/ML
0.5 INJECTION, SOLUTION INTRAMUSCULAR; INTRAVENOUS; SUBCUTANEOUS ONCE
Status: DISCONTINUED | OUTPATIENT
Start: 2018-08-12 | End: 2018-08-12

## 2018-08-12 RX ORDER — HYDROCODONE BITARTRATE AND ACETAMINOPHEN 5; 325 MG/1; MG/1
2 TABLET ORAL EVERY 4 HOURS PRN
Status: DISCONTINUED | OUTPATIENT
Start: 2018-08-12 | End: 2018-08-13

## 2018-08-12 RX ORDER — METOCLOPRAMIDE HYDROCHLORIDE 5 MG/ML
10 INJECTION INTRAMUSCULAR; INTRAVENOUS EVERY 8 HOURS PRN
Status: DISCONTINUED | OUTPATIENT
Start: 2018-08-12 | End: 2018-08-13

## 2018-08-12 NOTE — ED INITIAL ASSESSMENT (HPI)
Pt here with left flank pain with radiation to left groin. +vomited x 2 at home. Pt stating he had a kidney stone 3 weeks ago on the opposite side.  +diaphoretic

## 2018-08-12 NOTE — H&P
[facility]  History and Physical     L[facility] Patient Status:  Emergency    1975 MRN [id number]   Location [address] Attending Lee Hurd MD   [facility] Day # 0 PCP Tl Driscoll DO     Chief Complaint: flank Melanoma Maternal Uncle    • Melanoma Maternal Uncle    • Aneurysm [OTHER] Paternal Aunt      Cause of death   • Asthma Sister    • Prostate Cancer Other    • Uterine Cancer Other        Allergies:   Dust                      Naprosyn [Naproxen]     NAUSEA Disp #1DX J45.40 Disp: 1 each Rfl: 0   Omega 3 1200 MG Oral Cap Take 1 capsule by mouth daily. Disp: 90 capsule Rfl: 3       Review of Systems:   A comprehensive 14 point review of systems was completed.     Pertinent positives and negatives noted in the HP SCD  · CODE status: full  · Gonzalez: no    Plan of care discussed with ED physician    Ray Horne MD  8/12/2018

## 2018-08-12 NOTE — ED PROVIDER NOTES
Patient Seen in: [facility] Emergency Department    History   Patient presents with:  Abdomen/Flank Pain (GI/)    Stated Complaint: flank pain    HPI    65-year-old male here for severe left flank pain that started a little prior to arrival.  It i Normocephalic and atraumatic. Nose: Nose normal.   Eyes: EOM are normal. Pupils are equal, round, and reactive to light. Neck: Normal range of motion. Neck supple. No JVD present. Cardiovascular: Normal rate and regular rhythm.     Pulmonary/Chest: Ef (60) 1988 7496  ------------------------------------------------------------      [facility]     Admission disposition: 8/12/2018  2:10 PM         Ct Abdomen+pelvis Kidneystone 2d Rndr(no Iv,no Oral)(cpt=74176)    Result Date: 8/12/2018  CONCLUSION:  3.5 mm obstructing monster

## 2018-08-12 NOTE — CONSULTS
[facility]  Report of Consultation    Kelly Manfredsary Patient Status:  Observation    1975 MRN [id number]   [facility] 3NW-A Attending Marino Grimes MD   Hosp Day # 0 PCP Nick Egan DO     Impression and Plan:  Left, distal [id number]  Exp 12/18 ) Disp: 1 each Rfl: 3   cetirizine 10 MG Oral Tab Take 1 tablet (10 mg total) by mouth daily. Disp: 90 tablet Rfl: 4   Omega 3 1200 MG Oral Cap Take 1 capsule by mouth daily.  Disp: 90 capsule Rfl: 3         Current Facility-Administered age 14    LOC for 1 hour   • Current every day smoker    • GERD (gastroesophageal reflux disease)     EGD 2014 +Duodenitis, by Dr. Ling Crawley    • Hyperlipidemia    • Hypogonadism male     low testosterone.  MRI pituitary normal. symptomatic   • Hypothyroidism Pulse 59   Temp 98 °F (36.7 °C) (Oral)   Resp 20   Ht 6' 1\" (1.854 m)   Wt 270 lb (122.5 kg)   SpO2 98%   BMI 35.62 kg/m²   General: Alert, orientated x3. Cooperative. No apparent distress. HEENT: Exam is unremarkable.    Lungs: Clear to auscultation b

## 2018-08-12 NOTE — PROGRESS NOTES
Pharmacy Note: Dietary Supplement Discontinuation Per Policy    Omega 3 has been discontinued on [facility] per policy. This supplement may be restarted upon discharge using the medication reconciliation process.     Thank you,   Tye Little, PharmD  8

## 2018-08-12 NOTE — PROGRESS NOTES
[facility] Pharm[facility] Note: Antimicrobial Weight Dose Adjustment for: ceftriaxone (ROCEPHIN)    Colleen Larkin is a 36year old male who has been prescribed ceftriaxone (ROCEPHIN) 1000 mg every 24 hours.   CrCl is estimated creatinine clearance is 86.1 mL/min (based

## 2018-08-12 NOTE — ED NOTES
Pt c/o of severe pain to left flank area radiating to LLQ pt unable to sit on the stretcher stating the pain was fine 20 min ago and he does not know what happened.  Pt stating he feels like he wants to vomit. md updated see mar

## 2018-08-12 NOTE — ED NOTES
Patient is resting on stretcher attempting to get out of bed. Pt stating he is burning up at this time. md to bs. Ice packs applied to groin and armpits.

## 2018-08-12 NOTE — PLAN OF CARE
GENITOURINARY - ADULT    • Absence of urinary retention Progressing        Patient/Family Goals    • Patient/Family Long Term Goal Progressing    • Patient/Family Short Term Goal Progressing            NURSING ADMISSION NOTE      Patient admitted via Cart

## 2018-08-12 NOTE — PROGRESS NOTES
PATIENT HAS IV FLUIDS INFUSING, ON A CLEAR LIQUID DIET-WILL ADVANCE AS TOLERATED BUT WILL BE NPO AFTER MIDNIGHT FOR POSSIBLE CYSTOSCOPY TOMORROW.  PATIENT IS ON ROOM AIR, ON TELE RUNNING NSR W/ OCCASIONAL PVC'S, DUE TO VOID-WILL STRAIN URINE, CURRENTLY SHELBY

## 2018-08-13 VITALS
OXYGEN SATURATION: 96 % | RESPIRATION RATE: 20 BRPM | SYSTOLIC BLOOD PRESSURE: 148 MMHG | WEIGHT: 270 LBS | BODY MASS INDEX: 35.78 KG/M2 | HEART RATE: 63 BPM | HEIGHT: 73 IN | TEMPERATURE: 97 F | DIASTOLIC BLOOD PRESSURE: 75 MMHG

## 2018-08-13 PROCEDURE — 99217 OBSERVATION CARE DISCHARGE: CPT | Performed by: HOSPITALIST

## 2018-08-13 NOTE — PROGRESS NOTES
8/13/18 After all doctors/PA's have rounded, writer hand walked kidney stone to lab. [facility] All questions answered. No need to \"sign in\" stone to lab dept [facility]. Cont to monitor.

## 2018-08-13 NOTE — PLAN OF CARE
GENITOURINARY - ADULT    • Absence of urinary retention Progressing        Patient/Family Goals    • Patient/Family Long Term Goal Progressing    • Patient/Family Short Term Goal Progressing

## 2018-08-13 NOTE — RESPIRATORY THERAPY NOTE
YO : EQUIPMENT USE: DAILY SUMMARY                                            SET MODE: AUTO CPAP WITH CFLEX                                          USAGE IN HOURS: 4:38                                          90

## 2018-08-13 NOTE — PROGRESS NOTES
8/13/18 PCT reported pt passed a kidney stone. Kidney stone placed in LEONA container cup in bathroom until rounds made by Urology per order. Pt aware and agrees. Denies pain. Pt c/o nausea from not having eaten. Cont to monitor.

## 2018-08-13 NOTE — PROGRESS NOTES
[facility]    Progress Note    Viola Pradoshari Patient Status:  Observation    1975 MRN [id number]   [facility] 3NW-A Attending Jayro William MD   [facility] Day # 0 PCP Sayda Sheets DO         Subjective:   Viola Parkinson is a(n) 92 Eva Martin PA-C  Department of Urology  [facility]  8/13/2018

## 2018-08-13 NOTE — PLAN OF CARE
GENITOURINARY - ADULT    • Absence of urinary retention Progressing        Patient/Family Goals    • Patient/Family Long Term Goal Progressing    • Patient/Family Short Term Goal Progressing            Patient A&O, VSS. No complaints of pain at this time.

## 2018-08-15 LAB
CALCULI MASS: 8 MG
CALCULI NUMBER: 1

## 2018-08-16 ENCOUNTER — TELEPHONE (OUTPATIENT)
Dept: FAMILY MEDICINE CLINIC | Facility: CLINIC | Age: 43
End: 2018-08-16

## 2018-08-16 DIAGNOSIS — Z87.442 HISTORY OF NEPHROLITHIASIS: Primary | ICD-10-CM

## 2018-08-16 NOTE — DISCHARGE SUMMARY
[facility]  DISCHARGE SUMMARY     Ramakrishna Abbott Patient Status:  Observation    1975 MRN [id number]   [facility] 3NW-A Attending No att. providers found   Hosp Day # 0 PCP Michelle Chery DO     Date of Admission: 2018  Da details   Fluticasone Furoate-Vilanterol 200-25 MCG/INH Aepb  Commonly known as:  BREO ELLIPTA  What changed:  additional instructions      Inhale 1 puff into the lungs daily.    Quantity:  1 each  Refills:  3        CONTINUE taking these medications      I Soft, nontender, nondistended  -----------------------------------------------------------------------------------------------  PATIENT DISCHARGE INSTRUCTIONS: See electronic chart    Virginia Richardson MD 8/16/2018

## 2018-08-16 NOTE — TELEPHONE ENCOUNTER
Wife Ellis cameron, pt was admitted to [facility] for kidney stone, which he was able to pass. Wife believes that this is the second stone in 1 month that the patient has had. Pt believes that he passed the first stone.     Wife is concerned that he may have

## 2018-08-17 NOTE — PROGRESS NOTES
Patient notified via [address] Result has been released, patient account is currently active.

## 2018-08-20 ENCOUNTER — OFFICE VISIT (OUTPATIENT)
Dept: DERMATOLOGY CLINIC | Facility: CLINIC | Age: 43
End: 2018-08-20

## 2018-08-20 DIAGNOSIS — L91.8 INFLAMED SKIN TAG: ICD-10-CM

## 2018-08-20 DIAGNOSIS — L91.8 SKIN TAG: ICD-10-CM

## 2018-08-20 DIAGNOSIS — D48.5 NEOPLASM OF UNCERTAIN BEHAVIOR OF SKIN: Primary | ICD-10-CM

## 2018-08-20 PROCEDURE — 11100 BIOPSY OF SKIN LESION: CPT | Performed by: DERMATOLOGY

## 2018-08-20 PROCEDURE — 11200 RMVL SKIN TAGS UP TO&INC 15: CPT | Performed by: DERMATOLOGY

## 2018-08-20 PROCEDURE — 88305 TISSUE EXAM BY PATHOLOGIST: CPT | Performed by: DERMATOLOGY

## 2018-08-20 NOTE — PROGRESS NOTES
HPI:     Chief Complaint     Derm Problem        HPI     Derm Problem    Additional comments: \"New pt\"- Pt presents today with skin tags throughout body that tend to get irriated at times.         Last edited by Paulo Spatz, [address] on 8/20/2018  1:34 PM. ( hours with albuterol as needed. Disp #1DX J45.40 Disp: 1 each Rfl: 0   Omega 3 1200 MG Oral Cap Take 1 capsule by mouth daily.  Disp: 90 capsule Rfl: 3     Allergies:     Dust                      Naprosyn [Naproxen]     NAUSEA ONLY, PAIN    Comment:Abd khai Grandmother    • Heart Disease Maternal Grandfather    • Melanoma Maternal Uncle    • Melanoma Maternal Uncle    • Melanoma Maternal Uncle    • Aneurysm [OTHER] Paternal Aunt      Cause of death   • Asthma Sister    • Prostate Cancer Other    • Uterine Can

## 2018-08-20 NOTE — PROGRESS NOTES
Past Medical History:   Diagnosis Date   • Concussion age 14    LOC for 1 hour   • Current every day smoker    • GERD (gastroesophageal reflux disease)     EGD 2014 +Duodenitis, by Dr. Damon Sepulveda    • Hyperlipidemia    • Hypogonadism male     low testosterone.

## 2018-09-11 ENCOUNTER — TELEPHONE (OUTPATIENT)
Dept: NEPHROLOGY | Facility: CLINIC | Age: 43
End: 2018-09-11

## 2018-09-11 DIAGNOSIS — N20.0 NEPHROLITHIASIS: Primary | ICD-10-CM

## 2018-09-11 NOTE — TELEPHONE ENCOUNTER
Pt's wife cancelled new pt appt with Dr Laurie Rhodes due to pt's work schedule. Said she would call back to make another appt.

## 2018-10-08 ENCOUNTER — OFFICE VISIT (OUTPATIENT)
Dept: FAMILY MEDICINE CLINIC | Facility: CLINIC | Age: 43
End: 2018-10-08

## 2018-10-08 VITALS
WEIGHT: 270 LBS | DIASTOLIC BLOOD PRESSURE: 68 MMHG | TEMPERATURE: 99 F | RESPIRATION RATE: 18 BRPM | HEART RATE: 78 BPM | HEIGHT: 72 IN | OXYGEN SATURATION: 98 % | SYSTOLIC BLOOD PRESSURE: 118 MMHG | BODY MASS INDEX: 36.57 KG/M2

## 2018-10-08 DIAGNOSIS — S49.91XA INJURY OF RIGHT SHOULDER, INITIAL ENCOUNTER: ICD-10-CM

## 2018-10-08 DIAGNOSIS — M25.511 CHRONIC RIGHT SHOULDER PAIN: Primary | ICD-10-CM

## 2018-10-08 DIAGNOSIS — Z23 NEED FOR VACCINATION: ICD-10-CM

## 2018-10-08 DIAGNOSIS — G89.29 CHRONIC RIGHT SHOULDER PAIN: Primary | ICD-10-CM

## 2018-10-08 PROCEDURE — 90686 IIV4 VACC NO PRSV 0.5 ML IM: CPT | Performed by: FAMILY MEDICINE

## 2018-10-08 PROCEDURE — 99213 OFFICE O/P EST LOW 20 MIN: CPT | Performed by: FAMILY MEDICINE

## 2018-10-08 PROCEDURE — 90471 IMMUNIZATION ADMIN: CPT | Performed by: FAMILY MEDICINE

## 2018-10-08 RX ORDER — MELOXICAM 15 MG/1
15 TABLET ORAL DAILY
Qty: 30 TABLET | Refills: 0 | Status: SHIPPED | OUTPATIENT
Start: 2018-10-08 | End: 2019-04-01

## 2018-10-08 RX ORDER — OMEPRAZOLE 40 MG/1
CAPSULE, DELAYED RELEASE ORAL
Qty: 30 CAPSULE | Refills: 0 | Status: SHIPPED | OUTPATIENT
Start: 2018-10-08 | End: 2018-11-16

## 2018-10-08 NOTE — PATIENT INSTRUCTIONS
Bursitis  You have bursitis. This is an inflammation of the bursa. These are small, fluid-filled sacs that surround the larger joints of the body. The bursa help the muscles and tendons move smoothly over the joints.   Bursitis often happens in the should Date Last Reviewed: 11/21/2015  © 6561-8499 [employer]. [address] [facility] [place], [address]. All rights reserved. This information is not intended as a substitute for professional medical care.  Always follow your healthcare Clair Neri · Increasing pain or swelling at the joint  · Fever (1 degree above your normal temperature) lasting 24 to 48 hours Or, whatever your healthcare provider told you to report based on your condition  Date Last Reviewed: 11/21/2015  © 3403-3398 [employer]

## 2018-10-08 NOTE — PROGRESS NOTES
CHIEF COMPLAINT: Patient presents with:  Shoulder Pain: R-shoulder; x2 months; 400lb door on shoulder    HPI:     Marcellus George is a 36year old male presents for right shoulder pain times 2 months happened after a steel 400 pound door fell on his right sh Disease Maternal Grandfather    • Melanoma Maternal Uncle    • Melanoma Maternal Uncle    • Melanoma Maternal Uncle    • Other (Aneurysm) Paternal Aunt         Cause of death   • Asthma Sister    • Prostate Cancer Other    • Uterine Cancer Other       Soci HPI.  ROS:     Review of Systems  Positive for stated complaint: Right shoulder pain exacerbated with abduction to 90 degrees, denies weakness of the right upper extremity  Pertinent positives and negatives noted in the the HPI.     PHYSICAL EXAM:   / MD                                                             Specimen:    Neck, right neck                                                                          • Final Diagnosis: 08/20/2018                      Value: This result contains rich text fo URINE 08/12/2018 >10*   • Bacteria Urine 08/12/2018 None Seen    • Squamous Epi.  Cells 08/12/2018 None Seen    • Renal Tubular Epithelial* 08/12/2018 None Seen    • Transitional Cells 08/12/2018 None Seen    • Mucous Urine 08/12/2018 4+*   • Yeast Urine 08 daily. Take with food and water  Dispense: 30 tablet;  Refill: 0  Given history of irritation to the stomach with naproxen will treat empirically with omeprazole daily with meloxicam.  If having epigastric pain or discomfort needs to stop and call immediate follow-up. Return in about 3 weeks (around 10/29/2018).

## 2018-10-09 DIAGNOSIS — J45.40 MODERATE PERSISTENT ASTHMA WITHOUT COMPLICATION: ICD-10-CM

## 2018-10-09 DIAGNOSIS — Z00.00 ANNUAL PHYSICAL EXAM: ICD-10-CM

## 2018-10-09 DIAGNOSIS — E03.9 HYPOTHYROIDISM (ACQUIRED): ICD-10-CM

## 2018-10-09 DIAGNOSIS — J30.1 ALLERGIC RHINITIS DUE TO POLLEN: ICD-10-CM

## 2018-10-10 RX ORDER — LEVOTHYROXINE SODIUM 0.2 MG/1
200 TABLET ORAL
Qty: 90 TABLET | Refills: 0 | Status: SHIPPED | OUTPATIENT
Start: 2018-10-10 | End: 2019-01-15

## 2018-10-10 RX ORDER — LEVOTHYROXINE SODIUM 0.05 MG/1
50 TABLET ORAL
Qty: 90 TABLET | Refills: 0 | Status: SHIPPED | OUTPATIENT
Start: 2018-10-10 | End: 2019-01-15

## 2018-10-10 RX ORDER — MONTELUKAST SODIUM 10 MG/1
TABLET ORAL
Qty: 90 TABLET | Refills: 0 | Status: SHIPPED | OUTPATIENT
Start: 2018-10-10 | End: 2018-12-31

## 2018-10-10 RX ORDER — ALBUTEROL SULFATE 90 UG/1
AEROSOL, METERED RESPIRATORY (INHALATION)
Qty: 3 INHALER | Refills: 0 | Status: SHIPPED | OUTPATIENT
Start: 2018-10-10 | End: 2018-12-04

## 2018-10-10 NOTE — TELEPHONE ENCOUNTER
Pt requesting refills on Levothyroxine, montelukast, and Albuterol, protocols passed, refills approved    LOV 10/18/18    LF 7/3/18, 5/23/18, 10/20/17    No future appointments.

## 2018-10-23 NOTE — TELEPHONE ENCOUNTER
Placed referral for urology to discuss kidney stones. Admitted 8/16/2018. Wife requested referral and sent to , cancelled appt. Pt asymptomatic and recently seen in office with no back pain but shoulder issues.  Placed  Referral to [facility] FOR PHYSICAL REHABILITATION if asher

## 2018-11-12 DIAGNOSIS — S49.91XA INJURY OF RIGHT SHOULDER, INITIAL ENCOUNTER: ICD-10-CM

## 2018-11-12 DIAGNOSIS — G89.29 CHRONIC RIGHT SHOULDER PAIN: ICD-10-CM

## 2018-11-12 DIAGNOSIS — M25.511 CHRONIC RIGHT SHOULDER PAIN: ICD-10-CM

## 2018-11-12 RX ORDER — MELOXICAM 15 MG/1
15 TABLET ORAL DAILY
Qty: 30 TABLET | Refills: 0 | OUTPATIENT
Start: 2018-11-12

## 2018-11-12 NOTE — TELEPHONE ENCOUNTER
Pt requesting refill of Meloxicam, no protocol  unable to approve:     Last Time Medication was Filled:  10/8/18    Last Office Visit with PCP: 10/8/18      Refill not appropriate    No future appointments.

## 2018-11-16 RX ORDER — OMEPRAZOLE 40 MG/1
CAPSULE, DELAYED RELEASE ORAL
Qty: 30 CAPSULE | Refills: 0 | Status: SHIPPED | OUTPATIENT
Start: 2018-11-16 | End: 2019-04-01 | Stop reason: ALTCHOICE

## 2018-11-16 NOTE — TELEPHONE ENCOUNTER
Pt requesting refill of omeprazole, no protocol, unable to approve:     Last Time Medication was Filled:  10/8/18    Last Office Visit with PCP: 10/8/18    No future appointments.

## 2018-11-30 DIAGNOSIS — S49.91XA INJURY OF RIGHT SHOULDER, INITIAL ENCOUNTER: ICD-10-CM

## 2018-11-30 DIAGNOSIS — M25.511 CHRONIC RIGHT SHOULDER PAIN: ICD-10-CM

## 2018-11-30 DIAGNOSIS — G89.29 CHRONIC RIGHT SHOULDER PAIN: ICD-10-CM

## 2018-12-03 RX ORDER — MELOXICAM 15 MG/1
15 TABLET ORAL DAILY
Qty: 30 TABLET | Refills: 0 | OUTPATIENT
Start: 2018-12-03

## 2018-12-03 NOTE — TELEPHONE ENCOUNTER
Pt requesting refill of meloxicam, no protocol , unable to approve:     Last Time Medication was Filled:  10/8/18    Last Office Visit with PCP: 10/8/18    No future appointments.       Refill not appropriate

## 2018-12-04 DIAGNOSIS — Z00.00 ANNUAL PHYSICAL EXAM: ICD-10-CM

## 2018-12-04 RX ORDER — ALBUTEROL SULFATE 90 UG/1
AEROSOL, METERED RESPIRATORY (INHALATION)
Qty: 1 INHALER | Refills: 0 | Status: SHIPPED | OUTPATIENT
Start: 2018-12-04 | End: 2018-12-24

## 2018-12-04 NOTE — TELEPHONE ENCOUNTER
Pt requesting refill of Albuterol, protocol failed, unable to approve:     Last Time Medication was Filled:  10/10/18  Last Office Visit with PCP: 10/8/18    No future appointments.

## 2018-12-24 DIAGNOSIS — Z00.00 ANNUAL PHYSICAL EXAM: ICD-10-CM

## 2018-12-24 NOTE — TELEPHONE ENCOUNTER
Pt requesting refill of Albuterol, protocol failed, unable to approve:     Last Time Medication was Filled:  12/4/18    Last Office Visit with PCP: 10/8/18    No future appointments.

## 2018-12-26 RX ORDER — ALBUTEROL SULFATE 90 UG/1
AEROSOL, METERED RESPIRATORY (INHALATION)
Qty: 8.5 INHALER | Refills: 0 | Status: SHIPPED | OUTPATIENT
Start: 2018-12-26 | End: 2018-12-31

## 2018-12-31 DIAGNOSIS — E03.9 HYPOTHYROIDISM (ACQUIRED): ICD-10-CM

## 2018-12-31 DIAGNOSIS — J30.1 ALLERGIC RHINITIS DUE TO POLLEN: ICD-10-CM

## 2018-12-31 DIAGNOSIS — J45.40 MODERATE PERSISTENT ASTHMA WITHOUT COMPLICATION: ICD-10-CM

## 2018-12-31 DIAGNOSIS — Z00.00 ANNUAL PHYSICAL EXAM: ICD-10-CM

## 2018-12-31 RX ORDER — MONTELUKAST SODIUM 10 MG/1
TABLET ORAL
Qty: 90 TABLET | Refills: 0 | Status: SHIPPED | OUTPATIENT
Start: 2018-12-31 | End: 2019-04-01

## 2018-12-31 RX ORDER — LEVOTHYROXINE SODIUM 0.2 MG/1
200 TABLET ORAL
Qty: 90 TABLET | Refills: 0 | Status: CANCELLED | OUTPATIENT
Start: 2018-12-31

## 2018-12-31 RX ORDER — LEVOTHYROXINE SODIUM 0.05 MG/1
50 TABLET ORAL
Qty: 90 TABLET | Refills: 0 | Status: CANCELLED | OUTPATIENT
Start: 2018-12-31

## 2018-12-31 RX ORDER — ALBUTEROL SULFATE 90 UG/1
2 AEROSOL, METERED RESPIRATORY (INHALATION) EVERY 4 HOURS PRN
Qty: 8.5 INHALER | Refills: 2 | Status: SHIPPED | OUTPATIENT
Start: 2018-12-31 | End: 2019-05-02

## 2018-12-31 NOTE — TELEPHONE ENCOUNTER
PTs wife called requesting a refill of proair and montelukast, protocol failed. Unable to approve.     LOV with PCP 10/8/18    LF proair 12/26/18 #1 inhaler  LF montelukast 10/10/18 #90    Pts wife called stating that 1 inhaler was sent to pharmacy 12/26/18

## 2019-01-08 ENCOUNTER — PATIENT OUTREACH (OUTPATIENT)
Dept: FAMILY MEDICINE CLINIC | Facility: CLINIC | Age: 44
End: 2019-01-08

## 2019-01-15 DIAGNOSIS — E03.9 HYPOTHYROIDISM (ACQUIRED): ICD-10-CM

## 2019-01-15 RX ORDER — LEVOTHYROXINE SODIUM 0.05 MG/1
TABLET ORAL
Qty: 90 TABLET | Refills: 0 | Status: SHIPPED | OUTPATIENT
Start: 2019-01-15 | End: 2019-05-20

## 2019-01-15 RX ORDER — LEVOTHYROXINE SODIUM 0.2 MG/1
200 TABLET ORAL
Qty: 90 TABLET | Refills: 0 | Status: SHIPPED | OUTPATIENT
Start: 2019-01-15 | End: 2019-05-20

## 2019-01-15 NOTE — TELEPHONE ENCOUNTER
Pt requesting a refill of levothyroxine, protocol failed. Unable to approve. LOV with PCP 10/8/18    LF (200 mg and 50 mg) 10/10/18 #90    Spoke to wife, informed her pt is overdue for thyroid labs.  Wife then got her upset that pt was scheduled for his

## 2019-02-12 ENCOUNTER — TELEPHONE (OUTPATIENT)
Dept: FAMILY MEDICINE CLINIC | Facility: CLINIC | Age: 44
End: 2019-02-12

## 2019-02-12 NOTE — TELEPHONE ENCOUNTER
Patient called inquiring as to why he had missed call today from office. Informed pt that we have been trying to reach out to him to schedule his physical as well as rechecking his asthma at that appointment.  Informed pt spoke with his wife prior about ref

## 2019-03-03 ENCOUNTER — OFFICE VISIT (OUTPATIENT)
Dept: FAMILY MEDICINE CLINIC | Facility: CLINIC | Age: 44
End: 2019-03-03

## 2019-03-03 VITALS
TEMPERATURE: 101 F | DIASTOLIC BLOOD PRESSURE: 84 MMHG | HEART RATE: 103 BPM | OXYGEN SATURATION: 97 % | RESPIRATION RATE: 16 BRPM | SYSTOLIC BLOOD PRESSURE: 124 MMHG

## 2019-03-03 DIAGNOSIS — J10.1 INFLUENZA A: Primary | ICD-10-CM

## 2019-03-03 DIAGNOSIS — J45.41 MODERATE PERSISTENT ASTHMA WITH ACUTE EXACERBATION: ICD-10-CM

## 2019-03-03 LAB
POCT INFLUENZA A: POSITIVE
POCT INFLUENZA B: NEGATIVE

## 2019-03-03 PROCEDURE — 87502 INFLUENZA DNA AMP PROBE: CPT | Performed by: PHYSICIAN ASSISTANT

## 2019-03-03 PROCEDURE — 94640 AIRWAY INHALATION TREATMENT: CPT | Performed by: PHYSICIAN ASSISTANT

## 2019-03-03 PROCEDURE — 99214 OFFICE O/P EST MOD 30 MIN: CPT | Performed by: PHYSICIAN ASSISTANT

## 2019-03-03 RX ORDER — IPRATROPIUM BROMIDE AND ALBUTEROL SULFATE 2.5; .5 MG/3ML; MG/3ML
3 SOLUTION RESPIRATORY (INHALATION) ONCE
Status: COMPLETED | OUTPATIENT
Start: 2019-03-03 | End: 2019-03-03

## 2019-03-03 RX ORDER — OSELTAMIVIR PHOSPHATE 75 MG/1
75 CAPSULE ORAL 2 TIMES DAILY
Qty: 10 CAPSULE | Refills: 0 | Status: SHIPPED | OUTPATIENT
Start: 2019-03-03 | End: 2019-03-08

## 2019-03-03 RX ORDER — ALBUTEROL SULFATE 90 UG/1
2 AEROSOL, METERED RESPIRATORY (INHALATION) EVERY 6 HOURS PRN
Qty: 1 INHALER | Refills: 0 | Status: SHIPPED | OUTPATIENT
Start: 2019-03-03 | End: 2019-04-01

## 2019-03-03 RX ORDER — PREDNISONE 20 MG/1
20 TABLET ORAL 2 TIMES DAILY
Qty: 10 TABLET | Refills: 0 | Status: SHIPPED | OUTPATIENT
Start: 2019-03-03 | End: 2019-03-08

## 2019-03-03 RX ADMIN — IPRATROPIUM BROMIDE AND ALBUTEROL SULFATE 3 ML: 2.5; .5 SOLUTION RESPIRATORY (INHALATION) at 09:30:00

## 2019-03-03 NOTE — PATIENT INSTRUCTIONS
Prednisone with food. No Advil.  Tylenol is ok   Rest   Fluids   Albuterol every 4-6 hours as needed   Please follow up with PCP if no improvement or if symptoms worsen

## 2019-03-03 NOTE — PROGRESS NOTES
CHIEF COMPLAINT:   Patient presents with:  Flu  Cough  Breathing Problem        HPI:   Meri Pina is a 39year old male who presents for cough and fever for 2 days. Fever up 100.9. Took tylenol at 745 am today. +body aches/chills. No headaches.  Daughter and water Disp: 30 tablet Rfl: 0   Cyclobenzaprine HCl 10 MG Oral Tab Take 1 tablet (10 mg total) by mouth nightly as needed for Muscle spasms. Disp: 14 tablet Rfl: 0   Nebulizer Does not apply Misc Use every 4 hours with albuterol as needed.  Disp #1DX J45 wheezing throughout. No rales or crackles. . No decreased BS. CARDIO: RRR without murmur  LYMPH: No cervical or supraclavicular lymphadenopathy.        ASSESSMENT AND PLAN:     Suyapa Bowman is a 39year old male who presents with:     ASSESSMENT:  Joi Barry

## 2019-03-14 ENCOUNTER — OFFICE VISIT (OUTPATIENT)
Dept: FAMILY MEDICINE CLINIC | Facility: CLINIC | Age: 44
End: 2019-03-14

## 2019-03-14 VITALS
OXYGEN SATURATION: 98 % | HEIGHT: 73 IN | WEIGHT: 281 LBS | SYSTOLIC BLOOD PRESSURE: 138 MMHG | TEMPERATURE: 98 F | BODY MASS INDEX: 37.24 KG/M2 | DIASTOLIC BLOOD PRESSURE: 80 MMHG | HEART RATE: 80 BPM

## 2019-03-14 DIAGNOSIS — R10.32 LEFT LOWER QUADRANT PAIN: Primary | ICD-10-CM

## 2019-03-14 PROCEDURE — 99213 OFFICE O/P EST LOW 20 MIN: CPT | Performed by: PHYSICIAN ASSISTANT

## 2019-03-14 NOTE — PROGRESS NOTES
CHIEF COMPLAINT:     Patient presents with:  Abdominal Pain      HPI:   Alejandro Lehman is a 39year old male who presents with complaints of abdominal pain for 4 days.   Pain is suprapubic, 4/10, \"throbbing\", non radiating, sitting worsens pain, standing a WITH MELOXICAM DAILY. Disp: 30 capsule Rfl: 0   Cyclobenzaprine HCl 10 MG Oral Tab Take 1 tablet (10 mg total) by mouth nightly as needed for Muscle spasms. Disp: 14 tablet Rfl: 0   Nebulizer Does not apply Misc Use every 4 hours with albuterol as needed. normocephalic  EYES: EOM intact, PERRL. Conjunctiva normal.  Cornea clear. Lid margins normal.  No active drainage.   EARS: Right TM normal, no bulging, no retraction, no fluid, bony landmarks normal.  Left TM normal, no bulging, no retraction, no fluid,

## 2019-04-01 ENCOUNTER — OFFICE VISIT (OUTPATIENT)
Dept: FAMILY MEDICINE CLINIC | Facility: CLINIC | Age: 44
End: 2019-04-01

## 2019-04-01 VITALS
OXYGEN SATURATION: 98 % | TEMPERATURE: 98 F | HEIGHT: 72 IN | BODY MASS INDEX: 37.53 KG/M2 | RESPIRATION RATE: 20 BRPM | DIASTOLIC BLOOD PRESSURE: 70 MMHG | WEIGHT: 277.13 LBS | SYSTOLIC BLOOD PRESSURE: 126 MMHG | HEART RATE: 82 BPM

## 2019-04-01 DIAGNOSIS — Z13.29 SCREENING FOR ENDOCRINE, NUTRITIONAL, METABOLIC AND IMMUNITY DISORDER: ICD-10-CM

## 2019-04-01 DIAGNOSIS — E03.9 HYPOTHYROIDISM IN ADULT: ICD-10-CM

## 2019-04-01 DIAGNOSIS — E66.01 CLASS 2 SEVERE OBESITY WITH SERIOUS COMORBIDITY AND BODY MASS INDEX (BMI) OF 37.0 TO 37.9 IN ADULT, UNSPECIFIED OBESITY TYPE (HCC): ICD-10-CM

## 2019-04-01 DIAGNOSIS — J30.1 ALLERGIC RHINITIS DUE TO POLLEN: ICD-10-CM

## 2019-04-01 DIAGNOSIS — Z80.8 FAMILY HISTORY OF MELANOMA: ICD-10-CM

## 2019-04-01 DIAGNOSIS — E78.2 MIXED HYPERLIPIDEMIA: ICD-10-CM

## 2019-04-01 DIAGNOSIS — Z13.0 SCREENING FOR ENDOCRINE, NUTRITIONAL, METABOLIC AND IMMUNITY DISORDER: ICD-10-CM

## 2019-04-01 DIAGNOSIS — Z00.00 ANNUAL PHYSICAL EXAM: Primary | ICD-10-CM

## 2019-04-01 DIAGNOSIS — J45.40 MODERATE PERSISTENT ASTHMA WITHOUT COMPLICATION: ICD-10-CM

## 2019-04-01 DIAGNOSIS — Z80.0 FAMILY HISTORY OF COLON CANCER: ICD-10-CM

## 2019-04-01 DIAGNOSIS — Z13.0 SCREENING FOR IRON DEFICIENCY ANEMIA: ICD-10-CM

## 2019-04-01 DIAGNOSIS — Z13.228 SCREENING FOR ENDOCRINE, NUTRITIONAL, METABOLIC AND IMMUNITY DISORDER: ICD-10-CM

## 2019-04-01 DIAGNOSIS — Z13.21 SCREENING FOR ENDOCRINE, NUTRITIONAL, METABOLIC AND IMMUNITY DISORDER: ICD-10-CM

## 2019-04-01 PROBLEM — L91.8 INFLAMED SKIN TAG: Status: RESOLVED | Noted: 2018-08-20 | Resolved: 2019-04-01

## 2019-04-01 PROCEDURE — 99396 PREV VISIT EST AGE 40-64: CPT | Performed by: FAMILY MEDICINE

## 2019-04-01 RX ORDER — MONTELUKAST SODIUM 10 MG/1
TABLET ORAL
Qty: 90 TABLET | Refills: 3 | Status: SHIPPED | OUTPATIENT
Start: 2019-04-01 | End: 2020-11-16

## 2019-04-01 NOTE — PATIENT INSTRUCTIONS
Limit carbs < g daily, protein 70 g, calories 1800   Water 64 oz or more daily   Exercise brisk walk 30 mins or more 5 or more days weekly   3 small meals and 2 snacks   Whole food as much as possible->3 veg daily, lean meat, fish, nuts, legumes, saurabh benefits. Your main goal is to make fitness a lifetime commitment. Build a fitness plan that you can stick with. Choose activities you like. Go slowly, especially when just starting out. Work up to being active 30 minutes on most days.  Aim for a total of 1 professional's instructions. Eating Heart-Healthy Foods  Eating has a big impact on your heart health. In fact, eating healthier can improve several of your heart risks at once.  For instance, it helps you manage weight, cholesterol, and blood pressu the right foods  Aim to make these foods staples of your diet. If you have diabetes, you may have different recommendations than what is listed here:  · Fruits and vegetables provide plenty of nutrients without a lot of calories.  At meals, fill half your p horseradish, hot sauce, lemon, mustard, nonfat salad dressings, and vinegar. For salt-free herbs and spices, try basil, cilantro, cinnamon, pepper, and rosemary. Date Last Reviewed: 10/1/2017  © 9492-7435 [employer].  [address] [facility], variety of fresh fruits of different colors. Whole fruits are a better choice than fruit juices.  Low-fat or fat-free dairy  Servings: 2 to 3 a day  A serving is:  · 1 cup milk  · 1 cup yogurt  · One and a half ounces cheese  Best choices: Skim or 1% milk, to be hard or boring. You just need to know how to make healthier choices. The DASH eating plan has been developed to help you do just that. DASH stands for Dietary Approaches to Stop Hypertension.  It is a plan that has been proven to be healthier for your boil instead of frying. Remove skin from poultry before eating.  Limit how much red meat you eat.  Nuts, seeds, beans  Servings: 4 to 5 a week  A serving is:  · One-third cup nuts (one and a half ounces)  · 2 tablespoons nut butter or seeds  · Half a cup co cause you to miss school, work, or activities that you enjoy. · Asthma flare-ups can be dangerous, even deadly. · Uncontrolled asthma makes it more likely that you will need emergency department and in-hospital care.   · Uncontrolled asthma may cause perm to know what foods may be hiding calories or salt. · Choose lower-fat and lower-calorie versions of your favorites. · Use a small plate instead of a normal-sized plate.   Barrier 2: I lost weight before but I gained it right back.   Barrier Buster: Make t improving your cholesterol level, and decreasing your risk for diseases such as diabetes and heart disease. It may help keep your arteries open so that your heart can get the oxygen-rich blood it needs. All in all, losing weight makes you healthier.      Ex activity. · The most important part of the activity is that you break a sweat. This indicates your heart is working hard enough to burn fat. Date Last Reviewed: 6/1/2016  © 5732-4279 [employer]. [address] [facility], [address].  A gland  · Cancer  · Autoimmune diseases, such as multiple sclerosis and Crohn's disease  · Psoriasis  · Asthma  · Weakness or falls    What other tests might I have along with this test?  A healthcare provider may also want to check your parathyroid hormone healthcare provider if you take vitamin D supplements. Be sure your healthcare provider knows about all medicines, herbs, vitamins, and supplements you are taking.  This includes medicines that don't need a prescription and any illicit drugs you may use.

## 2019-04-01 NOTE — PROGRESS NOTES
REASON FOR VISIT:    Roro Novoa is a 39year old male who presents for an [address]. Rare use of albuterol with dust exposure or animal dander.  Used albuterol last week with relief after cleaning out dog cage due to cough and chest t Mother   Uterine Cancer (1) Other           Patient Active Problem List:     Hypogonadism male     Depression     GERD (gastroesophageal reflux disease)     Obesity, Class II, BMI 35-39.9, with comorbidity     YO on CPAP     Moderate persistent asthma lb  10/08/18 : 270 lb  08/12/18 : 270 lb  11/22/17 : 276 lb  11/13/17 : 276 lb 6.4 oz    Body mass index is 37.58 kg/m².     No results found for: GLUCOSE  Lab Results   Component Value Date    CHOLEST 180 11/04/2017    CHOLEST 177 11/13/2015    CHOLEST 190 Recommendation Internal Lab or Procedure External Lab or Procedure   Breast Cancer Screening   Every 2 yrs age 54-69 There are no preventive care reminders to display for this patient.     Pap Every 3 yrs age 21-68 or Pap and HPV every 5 yrs age 33-67 There External Lab or Procedure   Annual Monitoring of Persistent     Medications (ACE/ARB, digoxin, diuretics)    Potassium  Annually Potassium (mmol/L)   Date Value   08/12/2018      Comment:     Unable to report due to hemolysis.        POTASSIUM (mmol/L)   Da Montelukast Sodium 10 MG Oral Tab TAKE ONE TABLET BY MOUTH NIGHTLY Disp: 90 tablet Rfl: 0   Omeprazole 40 MG Oral Capsule Delayed Release TAKE 1 TABLET BY MOUTH WITH MELOXICAM DAILY.  Disp: 30 capsule Rfl: 0   Meloxicam 15 MG Oral Tab Take 1 tablet (15 mg Cancer Other    • Uterine Cancer Other       SOCIAL HISTORY:   Social History    Tobacco Use      Smoking status: Former Smoker        Types: Cigarettes        Quit date: 12/23/2015        Years since quitting: 3.2      Smokeless tobacco: Never Used    Alc RELEVANT CHRONIC CONDITIONS:   Roro Novoa is a 39year old male who presents for an [address].      PLAN SUMMARY:   1. Moderate persistent asthma without complication  ACT 23- controlled  AAP given and reviewed  Compliance with medications Tab; TAKE ONE TABLET BY MOUTH NIGHTLY  Dispense: 90 tablet; Refill: 3  Restart certirizine daily if flairs  Lifestyle allergy modifications    5. Family history of melanoma  - DERM - INTERNAL- grossweiner, annual skin check    6.  Family history of colon ca

## 2019-04-28 DIAGNOSIS — E03.9 HYPOTHYROIDISM (ACQUIRED): ICD-10-CM

## 2019-04-29 RX ORDER — LEVOTHYROXINE SODIUM 0.2 MG/1
200 TABLET ORAL
Qty: 90 TABLET | Refills: 0 | OUTPATIENT
Start: 2019-04-29

## 2019-04-29 RX ORDER — LEVOTHYROXINE SODIUM 0.05 MG/1
TABLET ORAL
Qty: 90 TABLET | Refills: 0 | OUTPATIENT
Start: 2019-04-29

## 2019-04-29 NOTE — TELEPHONE ENCOUNTER
S/w pt informed him overdue for labs to recheck thyroid. PT states he will go this upcoming Saturday to [facility] for fasting labs. Pt states he does have two weeks of medication.  Informed pt would deny this request but that we can send refills in on

## 2019-05-01 ENCOUNTER — TELEPHONE (OUTPATIENT)
Dept: FAMILY MEDICINE CLINIC | Facility: CLINIC | Age: 44
End: 2019-05-01

## 2019-05-02 DIAGNOSIS — J45.40 MODERATE PERSISTENT ASTHMA WITHOUT COMPLICATION: Primary | ICD-10-CM

## 2019-05-02 DIAGNOSIS — Z00.00 ANNUAL PHYSICAL EXAM: ICD-10-CM

## 2019-05-02 RX ORDER — ALBUTEROL SULFATE 90 UG/1
2 AEROSOL, METERED RESPIRATORY (INHALATION) EVERY 4 HOURS PRN
Qty: 8.5 INHALER | Refills: 2 | Status: SHIPPED | OUTPATIENT
Start: 2019-05-02 | End: 2019-07-23

## 2019-05-02 NOTE — TELEPHONE ENCOUNTER
Pt requesting refill of albuterol, passed protocol , refill approved, sent to pharmacy:     Last Time Medication was Filled:  12/31/18 #1 with 2 refills    Last Office Visit with PCP: 4/1/2019      No future appointments.

## 2019-05-04 ENCOUNTER — LAB ENCOUNTER (OUTPATIENT)
Dept: LAB | Facility: HOSPITAL | Age: 44
End: 2019-05-04
Attending: FAMILY MEDICINE
Payer: COMMERCIAL

## 2019-05-04 DIAGNOSIS — Z00.00 ANNUAL PHYSICAL EXAM: ICD-10-CM

## 2019-05-04 DIAGNOSIS — E03.9 HYPOTHYROIDISM (ACQUIRED): ICD-10-CM

## 2019-05-04 DIAGNOSIS — E78.2 MIXED HYPERLIPIDEMIA: ICD-10-CM

## 2019-05-04 DIAGNOSIS — Z13.228 SCREENING FOR ENDOCRINE, NUTRITIONAL, METABOLIC AND IMMUNITY DISORDER: ICD-10-CM

## 2019-05-04 DIAGNOSIS — Z13.29 SCREENING FOR ENDOCRINE, NUTRITIONAL, METABOLIC AND IMMUNITY DISORDER: ICD-10-CM

## 2019-05-04 DIAGNOSIS — Z13.0 SCREENING FOR IRON DEFICIENCY ANEMIA: ICD-10-CM

## 2019-05-04 DIAGNOSIS — E03.9 HYPOTHYROIDISM IN ADULT: ICD-10-CM

## 2019-05-04 DIAGNOSIS — R73.01 ELEVATED FASTING GLUCOSE: ICD-10-CM

## 2019-05-04 DIAGNOSIS — Z13.0 SCREENING FOR ENDOCRINE, NUTRITIONAL, METABOLIC AND IMMUNITY DISORDER: ICD-10-CM

## 2019-05-04 DIAGNOSIS — Z13.21 SCREENING FOR ENDOCRINE, NUTRITIONAL, METABOLIC AND IMMUNITY DISORDER: ICD-10-CM

## 2019-05-04 PROCEDURE — 85025 COMPLETE CBC W/AUTO DIFF WBC: CPT

## 2019-05-04 PROCEDURE — 84439 ASSAY OF FREE THYROXINE: CPT

## 2019-05-04 PROCEDURE — 80061 LIPID PANEL: CPT

## 2019-05-04 PROCEDURE — 80053 COMPREHEN METABOLIC PANEL: CPT

## 2019-05-04 PROCEDURE — 83036 HEMOGLOBIN GLYCOSYLATED A1C: CPT

## 2019-05-04 PROCEDURE — 36415 COLL VENOUS BLD VENIPUNCTURE: CPT

## 2019-05-04 PROCEDURE — 84443 ASSAY THYROID STIM HORMONE: CPT

## 2019-05-06 ENCOUNTER — TELEPHONE (OUTPATIENT)
Dept: FAMILY MEDICINE CLINIC | Facility: CLINIC | Age: 44
End: 2019-05-06

## 2019-05-06 NOTE — TELEPHONE ENCOUNTER
Results and recommendations reviewed with pt. Pt verbalized understanding. Declines scheduling an OV to discuss labs, states will have to call back to schedule Ov once knows work schedule.  Informed pt to schedule an appt asap.  ----- Message from Cheryl Gonzáles

## 2019-05-19 ENCOUNTER — TELEPHONE (OUTPATIENT)
Facility: CLINIC | Age: 44
End: 2019-05-19

## 2019-05-19 DIAGNOSIS — E03.9 HYPOTHYROIDISM (ACQUIRED): ICD-10-CM

## 2019-05-20 ENCOUNTER — PATIENT MESSAGE (OUTPATIENT)
Dept: FAMILY MEDICINE CLINIC | Facility: CLINIC | Age: 44
End: 2019-05-20

## 2019-05-20 DIAGNOSIS — E03.9 HYPOTHYROIDISM (ACQUIRED): ICD-10-CM

## 2019-05-20 RX ORDER — LEVOTHYROXINE SODIUM 0.2 MG/1
200 TABLET ORAL
Qty: 90 TABLET | Refills: 0 | OUTPATIENT
Start: 2019-05-20

## 2019-05-20 RX ORDER — LEVOTHYROXINE SODIUM 0.05 MG/1
TABLET ORAL
Qty: 90 TABLET | Refills: 0 | OUTPATIENT
Start: 2019-05-20

## 2019-05-20 NOTE — TELEPHONE ENCOUNTER
Pt requesting refill of levothyroxine 200 and 50 mcg , protocol failed, unable to approve:     Last Time Medication was Filled:  1/15/19    Last Office Visit with PCP: 4/1/2019      No future appointments. LMOM to return call to the office. PT was instructed 5/6/19 to schedule appt to discuss abnormal labs. Provided pt office phone [phone] along with office hours given.

## 2019-05-20 NOTE — TELEPHONE ENCOUNTER
Patient's thyroid is uncontrolled and we have attempted several times to call him and there is no response. He has excessive amount of thyroid. Thyroid medications denied. Hannah Lawson

## 2019-05-21 RX ORDER — LEVOTHYROXINE SODIUM 0.05 MG/1
50 TABLET ORAL
Qty: 30 TABLET | COMMUNITY
Start: 2019-05-21 | End: 2019-06-01

## 2019-05-21 RX ORDER — LEVOTHYROXINE SODIUM 0.05 MG/1
50 TABLET ORAL
Qty: 90 TABLET | Refills: 0 | Status: SHIPPED | OUTPATIENT
Start: 2019-05-21 | End: 2019-05-21

## 2019-05-21 RX ORDER — LEVOTHYROXINE SODIUM 0.2 MG/1
200 TABLET ORAL
Qty: 90 TABLET | Refills: 0 | Status: SHIPPED | OUTPATIENT
Start: 2019-05-21 | End: 2019-05-21

## 2019-05-21 RX ORDER — LEVOTHYROXINE SODIUM 0.2 MG/1
200 TABLET ORAL
Qty: 30 TABLET | Refills: 0 | COMMUNITY
Start: 2019-05-21 | End: 2019-06-01

## 2019-05-21 NOTE — TELEPHONE ENCOUNTER
Addressed by staff and nursing  Low TSH, needs OV to determine dosage and usage.  Scheduled 6/1/2019  #30 day supply of meds

## 2019-05-21 NOTE — TELEPHONE ENCOUNTER
Patients wife Eve Juan called looking for refill on  Thyroid medicine. Scheduled patient an  appointment on June 1 and patient  can not come in any sooner due to his work schedule. His wife Eve Juan  would like someone to call her back at [phone].

## 2019-05-21 NOTE — TELEPHONE ENCOUNTER
From: Rickie Nevarez  To: Batool Cisneros DO  Sent: 5/20/2019 7:10 PM CDT  Subject: Prescription Question    Can u please refill my 's Claudy thyroid medication . He has one pill left. Does he need to see u ?   We have an appointment for June 1st .maggie

## 2019-05-21 NOTE — TELEPHONE ENCOUNTER
Wife, Judit Hernandez notified that 30 day script was sent to the pharmacy. [facility] notified of the change from #90 to #30. Stressed that it is important that patient be seen on upcoming appointment on June 1st.  Wife would like Dr. Perla Blue to address any need for a cholesterol lowering medication based on patient's last labs results. She is unable to come to the appointment and is concerned that patient will not address. Judit Hernandez would also like to be added as primary contact for the patient. Routed to Dr. Perla Blue.

## 2019-06-01 ENCOUNTER — OFFICE VISIT (OUTPATIENT)
Dept: FAMILY MEDICINE CLINIC | Facility: CLINIC | Age: 44
End: 2019-06-01

## 2019-06-01 VITALS
TEMPERATURE: 98 F | DIASTOLIC BLOOD PRESSURE: 64 MMHG | BODY MASS INDEX: 39 KG/M2 | HEART RATE: 74 BPM | OXYGEN SATURATION: 98 % | RESPIRATION RATE: 20 BRPM | WEIGHT: 284.63 LBS | SYSTOLIC BLOOD PRESSURE: 108 MMHG

## 2019-06-01 DIAGNOSIS — E03.9 HYPOTHYROIDISM IN ADULT: Primary | ICD-10-CM

## 2019-06-01 DIAGNOSIS — E78.1 HYPERTRIGLYCERIDEMIA: ICD-10-CM

## 2019-06-01 PROCEDURE — 99214 OFFICE O/P EST MOD 30 MIN: CPT | Performed by: FAMILY MEDICINE

## 2019-06-01 RX ORDER — LEVOTHYROXINE SODIUM 0.03 MG/1
25 TABLET ORAL
Qty: 90 TABLET | Refills: 0 | Status: SHIPPED | OUTPATIENT
Start: 2019-06-01 | End: 2019-09-16

## 2019-06-01 RX ORDER — LEVOTHYROXINE SODIUM 0.2 MG/1
200 TABLET ORAL
Qty: 90 TABLET | Refills: 0 | Status: SHIPPED | OUTPATIENT
Start: 2019-06-01 | End: 2019-09-16

## 2019-06-01 RX ORDER — LEVOTHYROXINE SODIUM 0.03 MG/1
25 TABLET ORAL
Qty: 90 TABLET | Refills: 0 | Status: SHIPPED | OUTPATIENT
Start: 2019-06-01 | End: 2019-06-01

## 2019-06-01 NOTE — PROGRESS NOTES
CHIEF COMPLAINT: Patient presents with: Follow - Up: discuss lab results     HPI:     Sridhar Lentz is a 39year old male presents for discuss labs. Pt had a history of hypothyroidism and here to recheck. Has been tolerating the medication well.  Last T Aunt         Cause of death   • Asthma Sister    • Prostate Cancer Other    • Uterine Cancer Other    • No Known Problems Brother    • No Known Problems Daughter    • No Known Problems Brother    • No Known Problems Brother    • No Known Problems Brother the HPI. PHYSICAL EXAM:   /64 (BP Location: Right arm, Patient Position: Sitting, Cuff Size: large)   Pulse 74   Temp 97.8 °F (36.6 °C) (Oral)   Resp 20   Wt 284 lb 9.6 oz   SpO2 98%   BMI 38.60 kg/m²   Vital signs reviewed. Appears stated age, wel 48.6    • PLT 05/04/2019 221.0    • MCV 05/04/2019 85.4    • MCH 05/04/2019 29.3    • MCHC 05/04/2019 34.4    • RDW 05/04/2019 13.3    • RDW-SD 05/04/2019 41.1    • Neutrophil Absolute Prel* 05/04/2019 4.63    • Neutrophil Absolute 05/04/2019 4.63    • Lym 04/01/2020  Annual Physical due on 04/01/2020  Asthma Action Plan due on 04/01/2020  Asthma Control Test due on 04/01/2020  Influenza Vaccine Completed  Pneumococcal PPSV23 Medium Risk Adult Completed      Patient/Caregiver Education: Patient/Caregiver Edu

## 2019-06-01 NOTE — PATIENT INSTRUCTIONS
As of October 6th 2014, the Drug Enforcement Agency [facility]) is reclassifying all hydrocodone combination medications from Schedule III to Schedule II. This includes medications such as Norco, Vicodin, Lortab, Zohydro, and Vicoprofen.      What this means for enough thyroid hormone. This hormone is vital to body growth and metabolism. If you don’t make enough, many body processes slow down. This can cause symptoms throughout the body. Hypothyroidism can range from mild to severe.  The most severe form is called if you have any side effects from your medicines that bother you, especially any chest pain or irregular heartbeats.   · Never change the dosage or stop taking your thyroid pills without talking to your provider first.  General care  · Always talk with your the thyroid gland, this can also cause hypothyroidism. Sometimes the thyroid gland is not functioning because of lack of stimulation from the pituitary gland.   Symptoms of hypothyroidism can include:  · Fatigue  · Trouble concentrating or thinking clearly; because your dose of thyroid hormone will need to be adjusted. Follow-up care  See your healthcare provider for checkups as advised. You may need regular tests to check the level of thyroid hormone in your blood.   When to seek medical advice  Call your he

## 2019-06-13 ENCOUNTER — TELEPHONE (OUTPATIENT)
Dept: FAMILY MEDICINE CLINIC | Facility: CLINIC | Age: 44
End: 2019-06-13

## 2019-06-13 RX ORDER — LEVOTHYROXINE SODIUM 0.05 MG/1
TABLET ORAL
Qty: 30 TABLET | Refills: 0 | OUTPATIENT
Start: 2019-06-13

## 2019-06-13 NOTE — TELEPHONE ENCOUNTER
Patients wife called asking what is dose is his medication and wants to know why it is being denied. He is almost out of medication. Please call her and you can leave a message on her phone.

## 2019-06-13 NOTE — TELEPHONE ENCOUNTER
Called wife and informed her of new dosing of thyroid medication. Patient should be on 225mcg, which was decreased on 6/1/19 at office visit from [facility]. She verbalized understanding.   Advised that 90 day supply was sent to pharmacy at office visit with

## 2019-07-23 DIAGNOSIS — J45.40 MODERATE PERSISTENT ASTHMA WITHOUT COMPLICATION: ICD-10-CM

## 2019-07-24 NOTE — TELEPHONE ENCOUNTER
Pt requesting refill of Proair, passed protocol , refill approved, sent to pharmacy:     Last Time Medication was Filled: 5/2/2019 disp 8.5 refills 2      Next AAP is 4/2020 per MD ROMERO     Last Office Visit with PCP: 6/1/2019       No future appointment

## 2019-07-26 ENCOUNTER — TELEPHONE (OUTPATIENT)
Dept: FAMILY MEDICINE CLINIC | Facility: CLINIC | Age: 44
End: 2019-07-26

## 2019-07-26 NOTE — TELEPHONE ENCOUNTER
Shelia with [facility] pharmacy, called stating they need directions in regards to prescribed Inhaler for patient, Radha Damico stated there was a \"see Attached\" but nothing came attached to prescription. Please call back at [phone].

## 2019-07-26 NOTE — TELEPHONE ENCOUNTER
Albuterol Sulfate  (90 Base) MCG/ACT Inhalation Aero Soln Inhale 2 puffs into the lungs every 4 (four) hours as needed for Wheezing. for wheezing or shortness of breath Disp: 8.5 Inhaler Rfl: 2     Spoke with Joseph Bueno, discussed inhaler sig.  Jsoeph Bueno verbal

## 2019-09-16 ENCOUNTER — TELEPHONE (OUTPATIENT)
Dept: FAMILY MEDICINE CLINIC | Facility: CLINIC | Age: 44
End: 2019-09-16

## 2019-09-16 DIAGNOSIS — E03.9 HYPOTHYROIDISM IN ADULT: ICD-10-CM

## 2019-09-16 RX ORDER — LEVOTHYROXINE SODIUM 0.03 MG/1
TABLET ORAL
Qty: 90 TABLET | Refills: 0 | Status: SHIPPED | OUTPATIENT
Start: 2019-09-16 | End: 2019-12-23

## 2019-09-16 RX ORDER — LEVOTHYROXINE SODIUM 0.2 MG/1
200 TABLET ORAL
Qty: 90 TABLET | Refills: 0 | Status: SHIPPED | OUTPATIENT
Start: 2019-09-16 | End: 2019-12-23

## 2019-09-16 NOTE — TELEPHONE ENCOUNTER
Pt needs a refill on his Thyroid Medication, pt states he has been [place] and [place] of the [place] for work and its not able to make an appt until after 9/29/2019. pt will be out of medication in a few days.  Pt wants to know if he can get a 2 week refill until he

## 2019-10-15 ENCOUNTER — PATIENT MESSAGE (OUTPATIENT)
Dept: FAMILY MEDICINE CLINIC | Facility: CLINIC | Age: 44
End: 2019-10-15

## 2019-10-15 DIAGNOSIS — J45.40 MODERATE PERSISTENT ASTHMA WITHOUT COMPLICATION: ICD-10-CM

## 2019-10-15 NOTE — TELEPHONE ENCOUNTER
Requested Prescriptions     Pending Prescriptions Disp Refills   • PROAIR  (90 Base) MCG/ACT Inhalation Aero Soln [Pharmacy Med Name: PROAIR HFA 90 MCG INHALER] 8.5 Inhaler 2     Sig: INHALE 2 PUFFS BY MOUTH EVERY 4 HOURS AS NEEDED FOR WHEEZING OR S

## 2019-10-15 NOTE — TELEPHONE ENCOUNTER
From: Melyssa Hinds  To: Authur Gosselin, DO  Sent: 10/15/2019 11:51 AM CDT  Subject: Non-Urgent Medical Question    Do I have labs ordered for a repeat from the summer . I was going to go to the hospital in the next 2 weekends for a blood draw .   Espinoza Negrete

## 2019-11-05 PROBLEM — R73.01 ELEVATED FASTING GLUCOSE: Status: RESOLVED | Noted: 2019-05-04 | Resolved: 2019-11-05

## 2019-12-19 DIAGNOSIS — J45.40 MODERATE PERSISTENT ASTHMA WITHOUT COMPLICATION: ICD-10-CM

## 2019-12-19 DIAGNOSIS — E03.9 HYPOTHYROIDISM IN ADULT: ICD-10-CM

## 2019-12-20 RX ORDER — LEVOTHYROXINE SODIUM 0.03 MG/1
TABLET ORAL
Qty: 90 TABLET | Refills: 0 | OUTPATIENT
Start: 2019-12-20

## 2019-12-20 NOTE — TELEPHONE ENCOUNTER
Pt requesting refill of   Requested Prescriptions     Refused Prescriptions Disp Refills   • PROAIR  (90 Base) MCG/ACT Inhalation Aero Soln [Pharmacy Med Name: PROAIR HFA 90 MCG INHALER] 25.5 Inhaler 0     Sig: INHALE 2 PUFFS BY MOUTH EVERY 4 HOURS

## 2019-12-21 ENCOUNTER — APPOINTMENT (OUTPATIENT)
Dept: LAB | Facility: HOSPITAL | Age: 44
End: 2019-12-21
Attending: FAMILY MEDICINE
Payer: COMMERCIAL

## 2019-12-21 DIAGNOSIS — E03.9 HYPOTHYROIDISM IN ADULT: ICD-10-CM

## 2019-12-21 DIAGNOSIS — J30.1 ALLERGIC RHINITIS DUE TO POLLEN: ICD-10-CM

## 2019-12-21 DIAGNOSIS — E78.1 HYPERTRIGLYCERIDEMIA: ICD-10-CM

## 2019-12-21 DIAGNOSIS — J45.40 MODERATE PERSISTENT ASTHMA WITHOUT COMPLICATION: ICD-10-CM

## 2019-12-21 PROCEDURE — 80061 LIPID PANEL: CPT

## 2019-12-21 PROCEDURE — 84443 ASSAY THYROID STIM HORMONE: CPT

## 2019-12-21 PROCEDURE — 36415 COLL VENOUS BLD VENIPUNCTURE: CPT

## 2019-12-21 PROCEDURE — 84439 ASSAY OF FREE THYROXINE: CPT

## 2019-12-23 ENCOUNTER — TELEPHONE (OUTPATIENT)
Dept: FAMILY MEDICINE CLINIC | Facility: CLINIC | Age: 44
End: 2019-12-23

## 2019-12-23 DIAGNOSIS — E03.9 HYPOTHYROIDISM IN ADULT: ICD-10-CM

## 2019-12-23 DIAGNOSIS — J45.40 MODERATE PERSISTENT ASTHMA WITHOUT COMPLICATION: ICD-10-CM

## 2019-12-23 RX ORDER — ALBUTEROL SULFATE 90 UG/1
AEROSOL, METERED RESPIRATORY (INHALATION)
Qty: 8.5 INHALER | Refills: 2 | OUTPATIENT
Start: 2019-12-23

## 2019-12-23 RX ORDER — LEVOTHYROXINE SODIUM 0.2 MG/1
200 TABLET ORAL
Qty: 90 TABLET | Refills: 0 | OUTPATIENT
Start: 2019-12-23

## 2019-12-23 RX ORDER — LEVOTHYROXINE SODIUM 0.03 MG/1
TABLET ORAL
Qty: 90 TABLET | Refills: 0 | OUTPATIENT
Start: 2019-12-23

## 2019-12-23 RX ORDER — MONTELUKAST SODIUM 10 MG/1
TABLET ORAL
Qty: 90 TABLET | Refills: 3 | OUTPATIENT
Start: 2019-12-23

## 2019-12-23 RX ORDER — ALBUTEROL SULFATE 90 UG/1
AEROSOL, METERED RESPIRATORY (INHALATION)
Qty: 8.5 INHALER | Refills: 2 | Status: SHIPPED | OUTPATIENT
Start: 2019-12-23 | End: 2020-02-28

## 2019-12-23 RX ORDER — LEVOTHYROXINE SODIUM 0.03 MG/1
TABLET ORAL
Qty: 90 TABLET | Refills: 0 | Status: SHIPPED | OUTPATIENT
Start: 2019-12-23 | End: 2020-02-29

## 2019-12-23 RX ORDER — LEVOTHYROXINE SODIUM 0.2 MG/1
TABLET ORAL
Qty: 90 TABLET | Refills: 0 | Status: SHIPPED | OUTPATIENT
Start: 2019-12-23 | End: 2020-02-29

## 2019-12-23 NOTE — TELEPHONE ENCOUNTER
Requested Prescriptions     Pending Prescriptions Disp Refills   • Montelukast Sodium 10 MG Oral Tab 90 tablet 3     Sig: TAKE ONE TABLET BY MOUTH NIGHTLY   • Levothyroxine Sodium 200 MCG Oral Tab 90 tablet 0     Sig: Take 1 tablet (200 mcg total) by mouth

## 2019-12-23 NOTE — TELEPHONE ENCOUNTER
LMOM to return call to the office. Provided pt office phone [phone] along with office hours given.     Patient needs apt to discuss lab work also is requesting refills

## 2019-12-23 NOTE — TELEPHONE ENCOUNTER
----- Message from Dagoberto Vernon DO sent at 12/21/2019  1:26 PM CST -----  Results reviewed. Released to [address] Will discuss with patient at next visit.

## 2019-12-23 NOTE — TELEPHONE ENCOUNTER
Pt requesting refill of   Requested Prescriptions     Signed Prescriptions Disp Refills   • Albuterol Sulfate HFA (PROAIR HFA) 108 (90 Base) MCG/ACT Inhalation Aero Soln 8.5 Inhaler 2     Sig: INHALE 2 PUFFS BY MOUTH EVERY 4 HOURS AS NEEDED FOR WHEEZING

## 2019-12-30 ENCOUNTER — TELEPHONE (OUTPATIENT)
Dept: FAMILY MEDICINE CLINIC | Facility: CLINIC | Age: 44
End: 2019-12-30

## 2019-12-30 NOTE — TELEPHONE ENCOUNTER
I spoke with patient regarding NO SHOW status for office visit on  12/27/19 with Dr. Tl Driscoll. I informed patient our office has a $98.92 NO SHOW FEE POLICY. Patient will be charged $40.00 for any future NO SHOW appointments.  Patient verbalized unders

## 2020-01-09 ENCOUNTER — IMMUNIZATION (OUTPATIENT)
Dept: FAMILY MEDICINE CLINIC | Facility: CLINIC | Age: 45
End: 2020-01-09

## 2020-01-09 DIAGNOSIS — Z23 NEED FOR VACCINATION: ICD-10-CM

## 2020-01-09 PROCEDURE — 90686 IIV4 VACC NO PRSV 0.5 ML IM: CPT | Performed by: NURSE PRACTITIONER

## 2020-01-09 PROCEDURE — 90471 IMMUNIZATION ADMIN: CPT | Performed by: NURSE PRACTITIONER

## 2020-02-28 DIAGNOSIS — J45.40 MODERATE PERSISTENT ASTHMA WITHOUT COMPLICATION: ICD-10-CM

## 2020-02-28 RX ORDER — ALBUTEROL SULFATE 90 UG/1
AEROSOL, METERED RESPIRATORY (INHALATION)
Qty: 3 INHALER | Refills: 0 | Status: SHIPPED | OUTPATIENT
Start: 2020-02-28 | End: 2020-08-26

## 2020-02-29 ENCOUNTER — PATIENT MESSAGE (OUTPATIENT)
Dept: FAMILY MEDICINE CLINIC | Facility: CLINIC | Age: 45
End: 2020-02-29

## 2020-02-29 ENCOUNTER — OFFICE VISIT (OUTPATIENT)
Dept: FAMILY MEDICINE CLINIC | Facility: CLINIC | Age: 45
End: 2020-02-29

## 2020-02-29 VITALS
OXYGEN SATURATION: 98 % | BODY MASS INDEX: 37.09 KG/M2 | DIASTOLIC BLOOD PRESSURE: 86 MMHG | HEART RATE: 76 BPM | SYSTOLIC BLOOD PRESSURE: 154 MMHG | RESPIRATION RATE: 18 BRPM | HEIGHT: 72 IN | TEMPERATURE: 98 F | WEIGHT: 273.81 LBS

## 2020-02-29 DIAGNOSIS — E78.2 MIXED HYPERLIPIDEMIA: ICD-10-CM

## 2020-02-29 DIAGNOSIS — E03.9 HYPOTHYROIDISM IN ADULT: Primary | ICD-10-CM

## 2020-02-29 DIAGNOSIS — J45.41 MODERATE PERSISTENT ASTHMA WITH ACUTE EXACERBATION: ICD-10-CM

## 2020-02-29 PROCEDURE — 99214 OFFICE O/P EST MOD 30 MIN: CPT | Performed by: FAMILY MEDICINE

## 2020-02-29 RX ORDER — PREDNISONE 20 MG/1
TABLET ORAL
Qty: 30 TABLET | Refills: 0 | Status: SHIPPED | OUTPATIENT
Start: 2020-02-29 | End: 2020-10-15 | Stop reason: ALTCHOICE

## 2020-02-29 RX ORDER — LEVOTHYROXINE SODIUM 0.2 MG/1
TABLET ORAL
Qty: 90 TABLET | Refills: 0 | Status: SHIPPED | OUTPATIENT
Start: 2020-02-29 | End: 2020-05-11

## 2020-02-29 RX ORDER — PREDNISONE 20 MG/1
TABLET ORAL
Qty: 30 TABLET | Refills: 0 | Status: SHIPPED | OUTPATIENT
Start: 2020-02-29 | End: 2020-02-29

## 2020-02-29 RX ORDER — FLUTICASONE PROPIONATE AND SALMETEROL 500; 50 UG/1; UG/1
1 POWDER RESPIRATORY (INHALATION) 2 TIMES DAILY
Qty: 120 EACH | Refills: 3 | Status: SHIPPED | OUTPATIENT
Start: 2020-02-29 | End: 2020-03-30

## 2020-02-29 NOTE — PROGRESS NOTES
CHIEF COMPLAINT: Patient presents with:  Lab Results: discuss labs from December     HPI:     Alejandro Lehman is a 38year old male presents for medication monitoring/asthma check/discuss labs.     Patient admits to eating healthier i.e. more vegetables and l asthma    • Obesity, Class II, BMI 35-39.9, with comorbidity    • YO on CPAP    • Pneumonia child   • Tongue biting age 4    stitches      Past Surgical History:   Procedure Laterality Date   • MYRINGOTOMY, LASER-ASSISTED Bilateral infant , age 9      Fam Fluticasone Furoate-Vilanterol (BREO ELLIPTA) 200-25 MCG/INH Inhalation Aerosol Powder, Breath Activated Inhale 1 puff into the lungs daily. 3 each 3   • Nebulizer Does not apply Misc Use every 4 hours with albuterol as needed.  Disp #1  DX J45.40 1 each 0 affect.  Euthymic.  Normal speech.  Good eye contact.      LABS        Component      Latest Ref Rng & Units 12/21/2019 5/4/2019   Cholesterol, Total      <200 mg/dL 176 159   HDL Cholesterol      40 - 59 mg/dL 35 (L) 36 (L)   Triglycerides      30 - 149 mg sooner if worse or not improving    Meds This Visit:  Requested Prescriptions     Signed Prescriptions Disp Refills   • Levothyroxine Sodium 200 MCG Oral Tab 90 tablet 0     Sig: Take 1 tab ( with a 25mcg tab) every morning 30 min before breakfast   • flut symptoms-asthma.

## 2020-02-29 NOTE — PATIENT INSTRUCTIONS
As of October 6th 2014, the Drug Enforcement Agency [facility]) is reclassifying all hydrocodone combination medications from Schedule III to Schedule II. This includes medications such as Norco, Vicodin, Lortab, Zohydro, and Vicoprofen.      What this means for extended relief or generic equivalent 1 tablet every 12 hours to help with cough and phlegm  Take your prednisone  with a full meal and early in the day. Take  prednisone 20 mg steroid taper-follow instructions on the bottle.   Do not take any Ibuprofen, A relieve symptoms when they occur. Using an Asthma Action Plan will help you keep track of and respond to asthma symptoms. Avoiding triggers—the things that inflame your airways—will help prevent symptoms and flare-ups.    Your action plan  Your health cholesterol levels  Total cholesterol includes LDL and HDL cholesterol, as well as other fats in the bloodstream. If your total cholesterol is high, follow the steps below to help lower your total cholesterol level:  Eat less unhealthy fat  · Cut back on s might benefit from a cholesterol-lowering medicine. Date Last Reviewed: 6/1/2017  © 8632-7191 [employer]. 140[address] [facility], [address] [place] [address]. All rights reserved.  This information is not intended as a substitute for professional medi few pounds. Being more active each day and building muscle can help. Here’s how:  · Being active burns calories. You burn nearly twice as many calories just walking slowly as you do sitting. · Muscle burns more calories than fat.  So the more muscle you bu

## 2020-03-01 ENCOUNTER — TELEPHONE (OUTPATIENT)
Dept: FAMILY MEDICINE CLINIC | Facility: CLINIC | Age: 45
End: 2020-03-01

## 2020-03-01 NOTE — TELEPHONE ENCOUNTER
Pt took prednisone at 4pm yesterday on taper. Wife, Lashaun Hoff, calls and states pt very irritable. Went to walk dogs at 10 pm last night.  Normally in bed at 8:45pm. Unsure if normal. Wife denies hallucinations, violent behavior,severe mood swings,  depresse

## 2020-03-02 NOTE — TELEPHONE ENCOUNTER
LMOM to return call to the office. Provided pt office phone [phone] along with office hours given.

## 2020-03-02 NOTE — TELEPHONE ENCOUNTER
From: Radha Loving  To: Rafa Olguin DO  Sent: 2/29/2020 9:56 PM CST  Subject: Prescription Question    Urgent . Velvet Alvaradoroy Can we please have a phone call related to prednisone that was given to my  . He took the 40mg as prescribed at 430pm today .   [address] [facility]

## 2020-03-05 NOTE — TELEPHONE ENCOUNTER
LMOM to return call to the office as this is my 2nd attempt to try to reach you. Provided pt office phone [phone] along with office hours given.     also recommended patient send my-chart message if he cannot call during business hours

## 2020-03-06 NOTE — TELEPHONE ENCOUNTER
Spoke with patient's wife Jamin Scherer, on legal.   She states patient refused to take any additional prednisone, last dose was 2/29/2020. States he is \"back to normal.\"  Asthma well-managed now- taking montelukast, using daily inhaler and CPAP at night.

## 2020-04-02 ENCOUNTER — TELEPHONE (OUTPATIENT)
Dept: FAMILY MEDICINE CLINIC | Facility: CLINIC | Age: 45
End: 2020-04-02

## 2020-04-02 RX ORDER — FLUTICASONE PROPIONATE AND SALMETEROL 500; 50 UG/1; UG/1
1 POWDER RESPIRATORY (INHALATION) 2 TIMES DAILY
Qty: 60 EACH | Refills: 2 | Status: SHIPPED | OUTPATIENT
Start: 2020-04-02 | End: 2020-07-26

## 2020-04-02 NOTE — TELEPHONE ENCOUNTER
Pts spouse Minda Vann Who is on FYI, called wanting to ask the nurse some questions in regards to pts asthma and asthma medication. Please call mayra back at [phone].

## 2020-04-02 NOTE — TELEPHONE ENCOUNTER
Spoke with wife Barbara Iraheta  instructions provided  ACT form submitted to [facility] for completion

## 2020-04-02 NOTE — TELEPHONE ENCOUNTER
Patient may start prednisone 10 mg daily x7 days. Needs to restart fluticasone salmeterol 500/50 mcg per dose inhaler-rinse mouth after use. Most likely having mild flare due to not using preventative generic Wixela  Agree with nebulizer if approved.   Ag

## 2020-04-02 NOTE — TELEPHONE ENCOUNTER
LOV 2/29/2020  Spoke with Divina Barahona regarding asthma  Started Claritin 2 weeks ago  Denies wheezing, fever    Wife is trying to be proactive  They are walking a lot and he Just feels a little constricted, no SOB    He no longer has Wixela hub inhaler  Refil

## 2020-05-11 DIAGNOSIS — E03.9 HYPOTHYROIDISM IN ADULT: ICD-10-CM

## 2020-05-11 RX ORDER — LEVOTHYROXINE SODIUM 0.2 MG/1
TABLET ORAL
Qty: 90 TABLET | Refills: 0 | Status: SHIPPED | OUTPATIENT
Start: 2020-05-11 | End: 2020-07-26

## 2020-05-26 DIAGNOSIS — J30.1 ALLERGIC RHINITIS DUE TO POLLEN: ICD-10-CM

## 2020-05-26 DIAGNOSIS — J45.40 MODERATE PERSISTENT ASTHMA WITHOUT COMPLICATION: ICD-10-CM

## 2020-05-26 RX ORDER — MONTELUKAST SODIUM 10 MG/1
TABLET ORAL
Qty: 90 TABLET | Refills: 3 | OUTPATIENT
Start: 2020-05-26

## 2020-07-02 DIAGNOSIS — J45.40 MODERATE PERSISTENT ASTHMA WITHOUT COMPLICATION: ICD-10-CM

## 2020-07-02 RX ORDER — ALBUTEROL SULFATE 90 UG/1
AEROSOL, METERED RESPIRATORY (INHALATION)
Qty: 8.5 INHALER | Refills: 2 | OUTPATIENT
Start: 2020-07-02

## 2020-07-25 ENCOUNTER — LAB ENCOUNTER (OUTPATIENT)
Dept: LAB | Facility: HOSPITAL | Age: 45
End: 2020-07-25
Attending: FAMILY MEDICINE
Payer: COMMERCIAL

## 2020-07-25 DIAGNOSIS — E03.9 HYPOTHYROIDISM IN ADULT: ICD-10-CM

## 2020-07-25 LAB
T4 FREE SERPL-MCNC: 1.3 NG/DL (ref 0.8–1.7)
TSI SER-ACNC: 0.15 MIU/ML (ref 0.36–3.74)

## 2020-07-25 PROCEDURE — 36415 COLL VENOUS BLD VENIPUNCTURE: CPT

## 2020-07-25 PROCEDURE — 84443 ASSAY THYROID STIM HORMONE: CPT

## 2020-07-25 PROCEDURE — 84439 ASSAY OF FREE THYROXINE: CPT

## 2020-07-29 ENCOUNTER — TELEPHONE (OUTPATIENT)
Dept: FAMILY MEDICINE CLINIC | Facility: CLINIC | Age: 45
End: 2020-07-29

## 2020-07-29 RX ORDER — LEVOTHYROXINE SODIUM 175 UG/1
175 TABLET ORAL
Qty: 90 TABLET | Refills: 0 | Status: SHIPPED | OUTPATIENT
Start: 2020-07-29 | End: 2020-10-19 | Stop reason: DRUGHIGH

## 2020-07-29 NOTE — PROGRESS NOTES
Notified patient's wife, Leia Frye, per HIPAA. She verbalized understanding of information given. She will confirm dose and call us back if he is taking 200 mcg and requires different script.

## 2020-07-29 NOTE — TELEPHONE ENCOUNTER
Patient is taking levothyroxine 200 mcg currently. Script pended for 175 mcg.     To Rosario Witt, DO

## 2020-07-29 NOTE — TELEPHONE ENCOUNTER
Pts spouse Jennifer Galan who is on FYI called office stating pts thyroid medication dosage is 200 and not 250 as she previously states. Jennifer MorenoAdama wants to know what the patiens new dosage is.

## 2020-07-30 NOTE — TELEPHONE ENCOUNTER
Notified Aura Key of new prescription  Notified pharmacy that patient will  Levothyroxine 175 mcg tomorrow

## 2020-08-14 DIAGNOSIS — E03.9 HYPOTHYROIDISM IN ADULT: ICD-10-CM

## 2020-08-14 RX ORDER — LEVOTHYROXINE SODIUM 0.2 MG/1
200 TABLET ORAL
Qty: 90 TABLET | Refills: 0 | OUTPATIENT
Start: 2020-08-14

## 2020-08-14 RX ORDER — LEVOTHYROXINE SODIUM 0.2 MG/1
TABLET ORAL
Qty: 90 TABLET | Refills: 0 | Status: SHIPPED | OUTPATIENT
Start: 2020-08-14 | End: 2020-08-14 | Stop reason: DRUGHIGH

## 2020-08-14 NOTE — TELEPHONE ENCOUNTER
Requested Prescriptions     Refused Prescriptions Disp Refills   • Levothyroxine Sodium 200 MCG Oral Tab 90 tablet 0     Sig: Take 1 tablet (200 mcg total) by mouth every morning before breakfast.     Refused By: Shalonda REZA     Reason for Refusal: R

## 2020-08-26 DIAGNOSIS — J45.40 MODERATE PERSISTENT ASTHMA WITHOUT COMPLICATION: ICD-10-CM

## 2020-08-26 RX ORDER — ALBUTEROL SULFATE 90 UG/1
AEROSOL, METERED RESPIRATORY (INHALATION)
Qty: 3 INHALER | Refills: 0 | Status: SHIPPED | OUTPATIENT
Start: 2020-08-26 | End: 2020-12-01 | Stop reason: ALTCHOICE

## 2020-08-26 NOTE — PROGRESS NOTES
proair updated to reflect 90 days for insurance purposes  Spoke with pharmacy and they did not have script sent on 2/28/2020

## 2020-09-17 ENCOUNTER — IMMUNIZATION (OUTPATIENT)
Dept: FAMILY MEDICINE CLINIC | Facility: CLINIC | Age: 45
End: 2020-09-17

## 2020-09-17 DIAGNOSIS — Z23 NEED FOR VACCINATION: ICD-10-CM

## 2020-09-17 PROCEDURE — 90686 IIV4 VACC NO PRSV 0.5 ML IM: CPT | Performed by: FAMILY MEDICINE

## 2020-09-17 PROCEDURE — 90471 IMMUNIZATION ADMIN: CPT | Performed by: FAMILY MEDICINE

## 2020-10-15 ENCOUNTER — OFFICE VISIT (OUTPATIENT)
Dept: FAMILY MEDICINE CLINIC | Facility: CLINIC | Age: 45
End: 2020-10-15

## 2020-10-15 VITALS
BODY MASS INDEX: 37.11 KG/M2 | WEIGHT: 274 LBS | SYSTOLIC BLOOD PRESSURE: 128 MMHG | DIASTOLIC BLOOD PRESSURE: 74 MMHG | HEIGHT: 72 IN | OXYGEN SATURATION: 98 % | TEMPERATURE: 98 F | HEART RATE: 74 BPM

## 2020-10-15 DIAGNOSIS — G89.29 CHRONIC LEFT SHOULDER PAIN: ICD-10-CM

## 2020-10-15 DIAGNOSIS — E03.9 HYPOTHYROIDISM IN ADULT: Primary | ICD-10-CM

## 2020-10-15 DIAGNOSIS — F17.200 TOBACCO DEPENDENCE: ICD-10-CM

## 2020-10-15 DIAGNOSIS — M25.512 CHRONIC LEFT SHOULDER PAIN: ICD-10-CM

## 2020-10-15 PROBLEM — J30.1 ALLERGIC RHINITIS DUE TO POLLEN: Status: ACTIVE | Noted: 2020-10-15

## 2020-10-15 PROCEDURE — 36416 COLLJ CAPILLARY BLOOD SPEC: CPT | Performed by: FAMILY MEDICINE

## 2020-10-15 PROCEDURE — 84439 ASSAY OF FREE THYROXINE: CPT | Performed by: FAMILY MEDICINE

## 2020-10-15 PROCEDURE — 84443 ASSAY THYROID STIM HORMONE: CPT | Performed by: FAMILY MEDICINE

## 2020-10-15 PROCEDURE — 3078F DIAST BP <80 MM HG: CPT | Performed by: FAMILY MEDICINE

## 2020-10-15 PROCEDURE — 3074F SYST BP LT 130 MM HG: CPT | Performed by: FAMILY MEDICINE

## 2020-10-15 PROCEDURE — 3008F BODY MASS INDEX DOCD: CPT | Performed by: FAMILY MEDICINE

## 2020-10-15 PROCEDURE — 99214 OFFICE O/P EST MOD 30 MIN: CPT | Performed by: FAMILY MEDICINE

## 2020-10-15 RX ORDER — LEVOTHYROXINE SODIUM 175 UG/1
175 TABLET ORAL
Qty: 90 TABLET | Refills: 0 | Status: CANCELLED | OUTPATIENT
Start: 2020-10-15

## 2020-10-15 RX ORDER — MELOXICAM 15 MG/1
15 TABLET ORAL DAILY
Qty: 30 TABLET | Refills: 0 | Status: SHIPPED | OUTPATIENT
Start: 2020-10-15 | End: 2020-11-16

## 2020-10-15 NOTE — PROGRESS NOTES
CHIEF COMPLAINT: Patient presents with: Follow - Up: thyroid     HPI:     Rashaad Franco is a 38year old male presents for medication monitoring-hypothyroidism taking 175mcg of levothyroxine since July 2020. He has not performed repeat labs.  Has been michelle • YO on CPAP    • Pneumonia child   • Tongue biting age 10    stitches      Past Surgical History:   Procedure Laterality Date   • MYRINGOTOMY, LASER-ASSISTED Bilateral infant , age 4      Family History   Problem Relation Age of Onset   • High [place]ol[place] Montelukast Sodium 10 MG Oral Tab TAKE ONE TABLET BY MOUTH NIGHTLY 90 tablet 3   • Nebulizer Does not apply Misc Use every 4 hours with albuterol as needed.  Disp #1  DX J45.40 1 each 0       Allergies:    Dust                      Naprosyn [Naproxen]     N eye contact. LABS     No visits with results within 1 Month(s) from this visit.    Latest known visit with results is:   [facility] Lab Encounter on 07/25/2020   Component Date Value   • Free T4 07/25/2020 1.3    • TSH 07/25/2020 0.152* lowered to levothyroxin PPSV23 Medium Risk Adult Completed      Patient/Caregiver Education: Patient/Caregiver Education: There are no barriers to learning. Medical education done. Outcome: Patient verbalizes understanding.  Patient is notified to call with any questions, comp l

## 2020-10-15 NOTE — PROGRESS NOTES
Patient came in for draw of ordered fasting labs. Patient drawn out of left AC, x 1 attempt and tolerated well. 1 light green  tube drawn.

## 2020-10-15 NOTE — PATIENT INSTRUCTIONS
As of October 6th 2014, the Drug Enforcement Agency [facility]) is reclassifying all hydrocodone combination medications from Schedule III to Schedule II. This includes medications such as Norco, Vicodin, Lortab, Zohydro, and Vicoprofen.      What this means for

## 2020-10-20 ENCOUNTER — TELEPHONE (OUTPATIENT)
Dept: FAMILY MEDICINE CLINIC | Facility: CLINIC | Age: 45
End: 2020-10-20

## 2020-10-20 NOTE — PROGRESS NOTES
Patient denied missing doses at last visit  At 200 mcg he was too high and at 175 mcg of levothyroxine he is too low    A split dose of levothyroxine has been sent to pharmacy  Rx levothyroxine 200 mcg in a.m. 30 minutes before breakfast on Mondays Wednesd

## 2020-10-20 NOTE — TELEPHONE ENCOUNTER
Pts wife, mayra, calling back for test results. Please call mayra back at [phone] work number. Please ask for mayra.

## 2020-10-20 NOTE — TELEPHONE ENCOUNTER
Spoke to pt's wife and let her know results and provider message and recommendation for tsh and levothyroxine.   Wife voiced understanding

## 2020-11-14 DIAGNOSIS — M25.512 CHRONIC LEFT SHOULDER PAIN: ICD-10-CM

## 2020-11-14 DIAGNOSIS — G89.29 CHRONIC LEFT SHOULDER PAIN: ICD-10-CM

## 2020-11-15 DIAGNOSIS — J30.1 ALLERGIC RHINITIS DUE TO POLLEN: ICD-10-CM

## 2020-11-15 DIAGNOSIS — J45.40 MODERATE PERSISTENT ASTHMA WITHOUT COMPLICATION: ICD-10-CM

## 2020-11-16 RX ORDER — MONTELUKAST SODIUM 10 MG/1
TABLET ORAL
Qty: 90 TABLET | Refills: 1 | Status: SHIPPED | OUTPATIENT
Start: 2020-11-16 | End: 2021-01-14

## 2020-11-16 RX ORDER — MELOXICAM 15 MG/1
15 TABLET ORAL DAILY
Qty: 30 TABLET | Refills: 0 | Status: SHIPPED | OUTPATIENT
Start: 2020-11-16 | End: 2021-01-14 | Stop reason: ALTCHOICE

## 2020-11-16 NOTE — TELEPHONE ENCOUNTER
Pt requesting refill of   Requested Prescriptions     Pending Prescriptions Disp Refills   • Montelukast Sodium 10 MG Oral Tab [Pharmacy Med Name: MONTELUKAST SOD 10 MG TABLET] 90 tablet 3     Sig: TAKE 1 TABLET BY MOUTH EVERY DAY AT NIGHT     Passed vikas

## 2020-12-02 RX ORDER — ALBUTEROL SULFATE 90 UG/1
2 AEROSOL, METERED RESPIRATORY (INHALATION) EVERY 4 HOURS PRN
Qty: 1 INHALER | Refills: 0 | Status: SHIPPED | OUTPATIENT
Start: 2020-12-02 | End: 2021-01-04

## 2021-01-04 RX ORDER — ALBUTEROL SULFATE 90 UG/1
2 AEROSOL, METERED RESPIRATORY (INHALATION) EVERY 4 HOURS PRN
Qty: 6.7 INHALER | Refills: 0 | Status: SHIPPED | OUTPATIENT
Start: 2021-01-04 | End: 2021-01-14

## 2021-01-04 NOTE — TELEPHONE ENCOUNTER
Pt requesting refill of   Requested Prescriptions     Pending Prescriptions Disp Refills   • ALBUTEROL SULFATE  (90 Base) MCG/ACT Inhalation Aero Soln [Pharmacy Med Name: ALBUTEROL HFA (PROVENTIL) INH] 6.7 Inhaler 0     Sig: INHALE 2 PUFFS INTO TH

## 2021-01-14 ENCOUNTER — OFFICE VISIT (OUTPATIENT)
Dept: FAMILY MEDICINE CLINIC | Facility: CLINIC | Age: 46
End: 2021-01-14

## 2021-01-14 VITALS
HEART RATE: 69 BPM | DIASTOLIC BLOOD PRESSURE: 62 MMHG | OXYGEN SATURATION: 97 % | SYSTOLIC BLOOD PRESSURE: 128 MMHG | WEIGHT: 269.81 LBS | TEMPERATURE: 98 F | RESPIRATION RATE: 18 BRPM | BODY MASS INDEX: 35.76 KG/M2 | HEIGHT: 73 IN

## 2021-01-14 DIAGNOSIS — J30.89 ENVIRONMENTAL AND SEASONAL ALLERGIES: ICD-10-CM

## 2021-01-14 DIAGNOSIS — Z86.39 HISTORY OF NON ANEMIC VITAMIN B12 DEFICIENCY: ICD-10-CM

## 2021-01-14 DIAGNOSIS — J45.40 MODERATE PERSISTENT ASTHMA WITHOUT COMPLICATION: ICD-10-CM

## 2021-01-14 DIAGNOSIS — Z00.00 ANNUAL PHYSICAL EXAM: Primary | ICD-10-CM

## 2021-01-14 DIAGNOSIS — E78.2 MIXED HYPERLIPIDEMIA: ICD-10-CM

## 2021-01-14 DIAGNOSIS — Z13.0 SCREENING FOR IRON DEFICIENCY ANEMIA: ICD-10-CM

## 2021-01-14 DIAGNOSIS — Z13.0 SCREENING FOR ENDOCRINE, NUTRITIONAL, METABOLIC AND IMMUNITY DISORDER: ICD-10-CM

## 2021-01-14 DIAGNOSIS — E03.9 HYPOTHYROIDISM IN ADULT: ICD-10-CM

## 2021-01-14 DIAGNOSIS — Z13.228 SCREENING FOR ENDOCRINE, NUTRITIONAL, METABOLIC AND IMMUNITY DISORDER: ICD-10-CM

## 2021-01-14 DIAGNOSIS — Z13.29 SCREENING FOR ENDOCRINE, NUTRITIONAL, METABOLIC AND IMMUNITY DISORDER: ICD-10-CM

## 2021-01-14 DIAGNOSIS — Z13.21 SCREENING FOR ENDOCRINE, NUTRITIONAL, METABOLIC AND IMMUNITY DISORDER: ICD-10-CM

## 2021-01-14 PROBLEM — J30.1 ALLERGIC RHINITIS DUE TO POLLEN: Status: RESOLVED | Noted: 2020-10-15 | Resolved: 2021-01-14

## 2021-01-14 PROBLEM — N23 RENAL COLIC: Status: RESOLVED | Noted: 2018-08-12 | Resolved: 2021-01-14

## 2021-01-14 PROBLEM — S49.91XA INJURY OF RIGHT SHOULDER: Status: RESOLVED | Noted: 2018-10-08 | Resolved: 2021-01-14

## 2021-01-14 LAB
T4 FREE SERPL-MCNC: 1.2 NG/DL (ref 0.8–1.7)
TSI SER-ACNC: 2.46 MIU/ML (ref 0.36–3.74)

## 2021-01-14 PROCEDURE — 84443 ASSAY THYROID STIM HORMONE: CPT | Performed by: FAMILY MEDICINE

## 2021-01-14 PROCEDURE — 36416 COLLJ CAPILLARY BLOOD SPEC: CPT | Performed by: FAMILY MEDICINE

## 2021-01-14 PROCEDURE — 3008F BODY MASS INDEX DOCD: CPT | Performed by: FAMILY MEDICINE

## 2021-01-14 PROCEDURE — 3074F SYST BP LT 130 MM HG: CPT | Performed by: FAMILY MEDICINE

## 2021-01-14 PROCEDURE — 99396 PREV VISIT EST AGE 40-64: CPT | Performed by: FAMILY MEDICINE

## 2021-01-14 PROCEDURE — 3078F DIAST BP <80 MM HG: CPT | Performed by: FAMILY MEDICINE

## 2021-01-14 PROCEDURE — 84439 ASSAY OF FREE THYROXINE: CPT | Performed by: FAMILY MEDICINE

## 2021-01-14 RX ORDER — MONTELUKAST SODIUM 10 MG/1
TABLET ORAL
Qty: 90 TABLET | Refills: 1 | Status: SHIPPED | OUTPATIENT
Start: 2021-01-14 | End: 2021-11-27

## 2021-01-14 RX ORDER — ALBUTEROL SULFATE 90 UG/1
2 AEROSOL, METERED RESPIRATORY (INHALATION) EVERY 4 HOURS PRN
Qty: 18 G | Refills: 1 | Status: SHIPPED | OUTPATIENT
Start: 2021-01-14 | End: 2021-08-06

## 2021-01-14 RX ORDER — FLUTICASONE PROPIONATE AND SALMETEROL 500; 50 UG/1; UG/1
1 POWDER RESPIRATORY (INHALATION) 2 TIMES DAILY
Qty: 60 EACH | Refills: 2 | Status: SHIPPED | OUTPATIENT
Start: 2021-01-14 | End: 2021-01-22

## 2021-01-14 NOTE — PATIENT INSTRUCTIONS
Perform labs fasting 8 hours with water or black coffee or or black tea diet  soda only prior to exam.    -Encourage healthy diet of whole food and avoid processed food and sugary drinks and sodas.   Diet should include lean meats and vegetables including 5 your blood pressure to help prevent a stroke or heart attack  · Control diabetes, or reduce your risk of getting this disease  · Improve your heart and lung function  · Reach and maintain a healthy weight  · Make your muscles stronger and more limber so yo healthier can improve several of your heart risks at once. For instance, it helps you manage weight, cholesterol, and blood pressure. Here are ideas to help you make heart-healthy changes without giving up all the foods and flavors you love.   Getting start than what is listed here:  · Fruits and vegetables provide plenty of nutrients without a lot of calories. At meals, fill half your plate with these foods. Split the other half of your plate between whole grains and lean protein.   · Whole grains are high in cilantro, cinnamon, pepper, and rosemary. Date Last Reviewed: 10/1/2017  © 0426-6989 [employer]. 1407 [facility], [address] [place] [address]. All rights reserved. This information is not intended as a substitute for professional medical care. treatments do have risks. So talk with your healthcare provider if you have concerns. If you have osteoporosis, you can also learn ways to increase everyday safety. Date Last Reviewed: 5/1/2018  © 7996-9206 [employer].  Apurva Kwok. old, men: 1,000 mg  Pregnant or nursing: 15to 25years old: 1,300 mg, 23to 48years old: 1,000 mg  Older than 79 (women and men): 1,200 mg   Date Last Reviewed: 5/1/2018  © 0625-3755 [employer]. 1407 [facility], [facility] [facility].  A parathyroid gland  · Cancer  · Autoimmune diseases, such as multiple sclerosis and Crohn's disease  · Psoriasis  · Asthma  · Weakness or falls    What other tests might I have along with this test?  A healthcare provider may also want to check your parathy test?  Tell your healthcare provider if you take vitamin D supplements. Be sure your healthcare provider knows about all medicines, herbs, vitamins, and supplements you are taking.  This includes medicines that don't need a prescription and any illicit drug professional's instructions. Living with Osteoporosis: Regular Exercise  If you have osteoporosis, exercise is vital for your health. It can prevent bone fractures and spine changes. It will slow bone loss. Exercise will strengthen your body.  It c diagnosed with a heart condition, your doctor may recommend exercise to help stabilize your condition. To help make exercise a habit, choose safe, fun activities.   Be sure to check with your healthcare provider before starting an exercise program.   Why ex increased joint or muscle pain  · Have palpitations or an irregular heartbeat   Date Last Reviewed: 5/1/2016  © 1050-9859 [employer]. 1407 [facility], [address] [place] [address]. All rights reserved.  This information is not intended as a subst than your body burns, the extras are stored as fat. Your healthcare provider can help you create a diet plan to manage your calories. This will likely include eating healthier foods as well as exercising regularly.  To help you track your progress, keep a d recipes. Also, try these tips:  · Remove fat from meat and skin from poultry before cooking. · Skim fat from the surface of soups and sauces. · Broil, boil, bake, steam, grill, and microwave food without added fats.   · Choose ingredients that spice up yo and calcium can also help keep your bones strong. Later in life  In later years, both men and women need to take extra care of their bones. By this point, the body loses more bone than it makes.  If too much bone is lost, you may be at increased risk for f English muffin, whole wheat   175 mg/1 muffin   Cheddar cheese   205 mg/1 oz.    Navy beans, cooked   79 mg/1/2 cup   Kale   90 mg/1/2 cup   Ice cream strawberry   79 mg/1/2 cup           Orange, navel   56 mg/1 medium   Note: Calcium levels may vary depend (osteomalacia)  · Osteopenia  · Osteoporosis  · Rickets, in children  You may also need this test if you are at risk for low vitamin D levels.  Risks include:  · Being an older adult  · Having difficulty absorbing fat from your diet  · Having chronic kidney and feeling lightheaded. When the needle pricks your arm or hand, you may feel a slight sting or pain. Afterward, the site may be sore. What might affect my test results?   The amount of time you spend in the sunlight, your diet, and whether you take drew them.  · Protein-rich or salty foods. Eaten in large amounts, these foods may deplete calcium. · Caffeine. This increases calcium loss. People who drink a lot of coffee, tea, or soda lose more calcium than those who don't.   Date Last Reviewed: 5/1/2018  © professional medical care. Always follow your healthcare professional's instructions.

## 2021-01-14 NOTE — PROGRESS NOTES
REASON FOR VISIT:    Katja Client is a 54year old male who presents for an [address]. Asthma-controlled using Wixela twice daily rinsing mouth after use  Rare use of albuterol with dust exposure or animal dander.  Used albuterol last wee cancer.   Aneurysm [OTHER] (1) Paternal Aunt: Cause of death   Asthma (1) Sister   Cancer-uncertain type Maternal Grandmother   Diabetes (1) Mother   Heart Disease (2) Father, Maternal Grandfather-no MI or stent- not CAD   Heart Surgery (1) Father (58): Aor #1  DX J45.40 (Patient not taking: Reported on 1/14/2021 ) 1 each 0     Wt Readings from Last 6 Encounters:  01/14/21 : 269 lb 12.8 oz (122.4 kg)  10/15/20 : 274 lb (124.3 kg)  10/13/20 : 260 lb (117.9 kg)  02/29/20 : 273 lb 12.8 oz (124.2 kg)  06/01/19 : No    Annoyed : No    Guilty : No    Eye Opener : No    Scoring  Total Score: 0     Depression Screening (PHQ-2/PHQ-9): Over the LAST 2 WEEKS   Little interest or pleasure in doing things (over the last two weeks)?: Not at all    Feeling down, depressed, o results found for: HIV    Syphilis Screening Screen if pregnant or high risk No results found for: RPR    Hepatitis C Screening Screen those at high risk plus screen one time for adults born 1946-1 965 No results found for: HCVAB    Tuberculosis Screen if BY MOUTH EVERY DAY AT NIGHT 90 tablet 1   • Levothyroxine Sodium 200 MCG Oral Tab Take 1 tablet 200mcg po AC breakfast on MWF 38 tablet 0   • Levothyroxine Sodium 175 MCG Oral Tab Take 1 tab po AC breakfast Saturday, Sunday, Tuesday,Thursday, 52 tablet 0 Cause of death   • Asthma Sister    • Prostate Cancer Other    • Uterine Cancer Other    • No Known Problems Brother    • No Known Problems Daughter    • No Known Problems Brother    • No Known Problems Brother    • No Known Problems Brother       SOCIAL H dominant or suspicious mass  LUNGS: clear to auscultation  CARDIO: RRR without murmur  GI: good BS's, no masses, HSM or tenderness  :declined   RECTAL:declined  MUSCULOSKELETAL: back is not tender, FROM of the back  EXTREMITIES: no cyanosis, clubbing or modifications    5. Moderate persistent asthma without complication  - Montelukast Sodium 10 MG Oral Tab; TAKE 1 TABLET BY MOUTH EVERY DAY AT NIGHT  Dispense: 90 tablet; Refill: 1  - Albuterol Sulfate  (90 Base) MCG/ACT Inhalation Aero Soln;  Inhale Mumps Titer 11/04/2017   • Positive Rubella Titer 11/04/2017   • Positive Varicella Titer 11/04/2017   • TDAP 02/20/2014       Influenza Annually   Pneumococcal if high risk   Td/Tdap once then every 10 years   HPV Females 11-26: 3 doses   Zoster (Shingles

## 2021-01-14 NOTE — PROGRESS NOTES
Patient came in for draw of ordered fasting labs. Patient drawn out of right AC, x 1 attempt and tolerated well. 1 light green tube drawn.

## 2021-01-15 NOTE — PROGRESS NOTES
Results reviewed. Released to [address] Tests show no significant abnormalities. Well-controlled thyroid continue current dose of levothyroxine.

## 2021-01-18 RX ORDER — LEVOTHYROXINE SODIUM 175 UG/1
TABLET ORAL
Qty: 52 TABLET | Refills: 0 | Status: SHIPPED | OUTPATIENT
Start: 2021-01-18 | End: 2021-04-13

## 2021-01-18 NOTE — TELEPHONE ENCOUNTER
Pt requesting refill of   Requested Prescriptions     Pending Prescriptions Disp Refills   • Levothyroxine Sodium 175 MCG Oral Tab [Pharmacy Med Name: LEVOTHYROXINE 175 MCG TABLET] 52 tablet 0     Sig: TAKE 1 TAB BY MOUTH BEFORE BREAKFAST SATURDAY, [address],

## 2021-01-22 ENCOUNTER — TELEPHONE (OUTPATIENT)
Dept: FAMILY MEDICINE CLINIC | Facility: CLINIC | Age: 46
End: 2021-01-22

## 2021-01-22 DIAGNOSIS — E03.9 HYPOTHYROIDISM IN ADULT: ICD-10-CM

## 2021-01-22 DIAGNOSIS — J45.40 MODERATE PERSISTENT ASTHMA WITHOUT COMPLICATION: ICD-10-CM

## 2021-01-22 RX ORDER — FLUTICASONE PROPIONATE AND SALMETEROL 500; 50 UG/1; UG/1
1 POWDER RESPIRATORY (INHALATION) 2 TIMES DAILY
Qty: 180 EACH | Refills: 0 | Status: SHIPPED | OUTPATIENT
Start: 2021-01-22 | End: 2021-08-06

## 2021-01-22 NOTE — TELEPHONE ENCOUNTER
Incoming fax from pharmacy states that pt would like a 90 day supply of the inhaler fluticasone-salmeterol (WIXELA INHUB) 500-50 MCG/DOSE Inhalation Aerosol Powder, Breath Activated  Filled 1/14/21 #60 with 2 refills  Sent in 90 day supply rx

## 2021-04-01 ENCOUNTER — IMMUNIZATION (OUTPATIENT)
Dept: LAB | Facility: HOSPITAL | Age: 46
End: 2021-04-01
Attending: HOSPITALIST
Payer: COMMERCIAL

## 2021-04-01 DIAGNOSIS — Z23 NEED FOR VACCINATION: Primary | ICD-10-CM

## 2021-04-01 PROCEDURE — 0011A SARSCOV2 VAC 100MCG/0.5ML IM: CPT

## 2021-04-13 DIAGNOSIS — E03.9 HYPOTHYROIDISM IN ADULT: ICD-10-CM

## 2021-04-13 RX ORDER — LEVOTHYROXINE SODIUM 175 UG/1
TABLET ORAL
Qty: 52 TABLET | Refills: 0 | Status: SHIPPED | OUTPATIENT
Start: 2021-04-13 | End: 2021-09-16

## 2021-04-13 NOTE — TELEPHONE ENCOUNTER
Pt requesting refill of   Requested Prescriptions     Signed Prescriptions Disp Refills   • LEVOTHYROXINE SODIUM 175 MCG Oral Tab 52 tablet 0     Sig: TAKE 1 TAB BY MOUTH BEFORE BREAKFAST SATURDAY, 36 [address],     Authorizing Provider: Mikki Seay

## 2021-04-14 DIAGNOSIS — E03.9 HYPOTHYROIDISM IN ADULT: ICD-10-CM

## 2021-04-14 RX ORDER — LEVOTHYROXINE SODIUM 0.2 MG/1
TABLET ORAL
Qty: 90 TABLET | Refills: 0 | OUTPATIENT
Start: 2021-04-14

## 2021-04-14 NOTE — TELEPHONE ENCOUNTER
Pt requesting refill of   Requested Prescriptions     Pending Prescriptions Disp Refills   • Levothyroxine Sodium 200 MCG Oral Tab [Pharmacy Med Name: LEVOTHYROXINE 200 MCG TABLET] 90 tablet 0     Sig: TAKE 1 TAB ( WITH A 25MCG TAB) EVERY MORNING 30 MIN

## 2021-05-06 ENCOUNTER — PATIENT MESSAGE (OUTPATIENT)
Dept: FAMILY MEDICINE CLINIC | Facility: CLINIC | Age: 46
End: 2021-05-06

## 2021-05-06 NOTE — TELEPHONE ENCOUNTER
From: Nila Robles  To:  Azael Serrano DO  Sent: 5/6/2021 3:36 PM CDT  Subject: Non-Urgent Medical Question    Hi,  My  Jony Veliz did get his first [place], he is unsure about getting the second dose ,today he thought maybe in the next 2 wee

## 2021-06-21 DIAGNOSIS — J45.40 MODERATE PERSISTENT ASTHMA WITHOUT COMPLICATION: ICD-10-CM

## 2021-06-21 NOTE — TELEPHONE ENCOUNTER
Pt requesting refill of   Requested Prescriptions     Pending Prescriptions Disp Refills   • ALBUTEROL SULFATE  (90 Base) MCG/ACT Inhalation Aero Soln [Pharmacy Med Name: ALBUTEROL HFA (VENTOLIN) INH]  0     Sig: INHALE 2 PUFFS INTO THE LUNGS EVER

## 2021-06-23 RX ORDER — ALBUTEROL SULFATE 90 UG/1
2 AEROSOL, METERED RESPIRATORY (INHALATION) EVERY 4 HOURS PRN
Refills: 0 | OUTPATIENT
Start: 2021-06-23

## 2021-06-30 ENCOUNTER — LAB ENCOUNTER (OUTPATIENT)
Dept: LAB | Age: 46
End: 2021-06-30
Attending: FAMILY MEDICINE
Payer: COMMERCIAL

## 2021-06-30 DIAGNOSIS — Z86.39 HISTORY OF NON ANEMIC VITAMIN B12 DEFICIENCY: ICD-10-CM

## 2021-06-30 DIAGNOSIS — E78.2 MIXED HYPERLIPIDEMIA: ICD-10-CM

## 2021-06-30 DIAGNOSIS — Z13.0 SCREENING FOR IRON DEFICIENCY ANEMIA: ICD-10-CM

## 2021-06-30 DIAGNOSIS — Z13.228 SCREENING FOR ENDOCRINE, NUTRITIONAL, METABOLIC AND IMMUNITY DISORDER: ICD-10-CM

## 2021-06-30 DIAGNOSIS — Z00.00 ANNUAL PHYSICAL EXAM: ICD-10-CM

## 2021-06-30 DIAGNOSIS — Z13.29 SCREENING FOR ENDOCRINE, NUTRITIONAL, METABOLIC AND IMMUNITY DISORDER: ICD-10-CM

## 2021-06-30 DIAGNOSIS — Z13.21 SCREENING FOR ENDOCRINE, NUTRITIONAL, METABOLIC AND IMMUNITY DISORDER: ICD-10-CM

## 2021-06-30 DIAGNOSIS — E03.9 HYPOTHYROIDISM IN ADULT: ICD-10-CM

## 2021-06-30 DIAGNOSIS — Z13.0 SCREENING FOR ENDOCRINE, NUTRITIONAL, METABOLIC AND IMMUNITY DISORDER: ICD-10-CM

## 2021-06-30 LAB
ALBUMIN SERPL-MCNC: 4.5 G/DL (ref 3.4–5)
ALBUMIN/GLOB SERPL: 1.4 {RATIO} (ref 1–2)
ALP LIVER SERPL-CCNC: 81 U/L
ALT SERPL-CCNC: 37 U/L
ANION GAP SERPL CALC-SCNC: 5 MMOL/L (ref 0–18)
AST SERPL-CCNC: 20 U/L (ref 15–37)
BASOPHILS # BLD AUTO: 0.08 X10(3) UL (ref 0–0.2)
BASOPHILS NFR BLD AUTO: 0.7 %
BILIRUB SERPL-MCNC: 0.4 MG/DL (ref 0.1–2)
BUN BLD-MCNC: 14 MG/DL (ref 7–18)
BUN/CREAT SERPL: 15.7 (ref 10–20)
CALCIUM BLD-MCNC: 8.9 MG/DL (ref 8.5–10.1)
CHLORIDE SERPL-SCNC: 105 MMOL/L (ref 98–112)
CHOLEST SMN-MCNC: 186 MG/DL (ref ?–200)
CO2 SERPL-SCNC: 27 MMOL/L (ref 21–32)
CREAT BLD-MCNC: 0.89 MG/DL
DEPRECATED RDW RBC AUTO: 40.8 FL (ref 35.1–46.3)
EOSINOPHIL # BLD AUTO: 0.53 X10(3) UL (ref 0–0.7)
EOSINOPHIL NFR BLD AUTO: 4.6 %
ERYTHROCYTE [DISTWIDTH] IN BLOOD BY AUTOMATED COUNT: 13.2 % (ref 11–15)
GLOBULIN PLAS-MCNC: 3.2 G/DL (ref 2.8–4.4)
GLUCOSE BLD-MCNC: 67 MG/DL (ref 70–99)
HCT VFR BLD AUTO: 48.6 %
HDLC SERPL-MCNC: 35 MG/DL (ref 40–59)
HGB BLD-MCNC: 17 G/DL
IMM GRANULOCYTES # BLD AUTO: 0.09 X10(3) UL (ref 0–1)
IMM GRANULOCYTES NFR BLD: 0.8 %
LDLC SERPL CALC-MCNC: 83 MG/DL (ref ?–100)
LYMPHOCYTES # BLD AUTO: 3.48 X10(3) UL (ref 1–4)
LYMPHOCYTES NFR BLD AUTO: 30.1 %
M PROTEIN MFR SERPL ELPH: 7.7 G/DL (ref 6.4–8.2)
MCH RBC QN AUTO: 30.2 PG (ref 26–34)
MCHC RBC AUTO-ENTMCNC: 35 G/DL (ref 31–37)
MCV RBC AUTO: 86.5 FL
MONOCYTES # BLD AUTO: 1.31 X10(3) UL (ref 0.1–1)
MONOCYTES NFR BLD AUTO: 11.3 %
NEUTROPHILS # BLD AUTO: 6.06 X10 (3) UL (ref 1.5–7.7)
NEUTROPHILS # BLD AUTO: 6.06 X10(3) UL (ref 1.5–7.7)
NEUTROPHILS NFR BLD AUTO: 52.5 %
NONHDLC SERPL-MCNC: 151 MG/DL (ref ?–130)
OSMOLALITY SERPL CALC.SUM OF ELEC: 283 MOSM/KG (ref 275–295)
PATIENT FASTING Y/N/NP: YES
PATIENT FASTING Y/N/NP: YES
PLATELET # BLD AUTO: 244 10(3)UL (ref 150–450)
POTASSIUM SERPL-SCNC: 3.6 MMOL/L (ref 3.5–5.1)
RBC # BLD AUTO: 5.62 X10(6)UL
SODIUM SERPL-SCNC: 137 MMOL/L (ref 136–145)
T4 FREE SERPL-MCNC: 1.3 NG/DL (ref 0.8–1.7)
TRIGL SERPL-MCNC: 419 MG/DL (ref 30–149)
TSI SER-ACNC: 0.58 MIU/ML (ref 0.36–3.74)
VIT B12 SERPL-MCNC: 473 PG/ML (ref 193–986)
VLDLC SERPL CALC-MCNC: 68 MG/DL (ref 0–30)
WBC # BLD AUTO: 11.6 X10(3) UL (ref 4–11)

## 2021-06-30 PROCEDURE — 36415 COLL VENOUS BLD VENIPUNCTURE: CPT

## 2021-06-30 PROCEDURE — 84443 ASSAY THYROID STIM HORMONE: CPT

## 2021-06-30 PROCEDURE — 84439 ASSAY OF FREE THYROXINE: CPT

## 2021-06-30 PROCEDURE — 85025 COMPLETE CBC W/AUTO DIFF WBC: CPT

## 2021-06-30 PROCEDURE — 80061 LIPID PANEL: CPT

## 2021-06-30 PROCEDURE — 80053 COMPREHEN METABOLIC PANEL: CPT

## 2021-06-30 PROCEDURE — 82607 VITAMIN B-12: CPT

## 2021-08-04 DIAGNOSIS — J45.40 MODERATE PERSISTENT ASTHMA WITHOUT COMPLICATION: ICD-10-CM

## 2021-08-05 DIAGNOSIS — E03.9 HYPOTHYROIDISM IN ADULT: ICD-10-CM

## 2021-08-05 NOTE — TELEPHONE ENCOUNTER
Pt requesting refill of   Requested Prescriptions     Pending Prescriptions Disp Refills   • ALBUTEROL SULFATE  (90 Base) MCG/ACT Inhalation Aero Soln [Pharmacy Med Name: ALBUTEROL HFA (VENTOLIN) INH]  0     Sig: INHALE 2 PUFFS INTO THE LUNGS EVERY

## 2021-08-06 RX ORDER — LEVOTHYROXINE SODIUM 0.2 MG/1
TABLET ORAL
Qty: 30 TABLET | Refills: 0 | Status: SHIPPED | OUTPATIENT
Start: 2021-08-06 | End: 2021-08-30

## 2021-08-06 RX ORDER — ALBUTEROL SULFATE 90 UG/1
2 AEROSOL, METERED RESPIRATORY (INHALATION) EVERY 4 HOURS PRN
Qty: 6.7 G | Refills: 0 | Status: SHIPPED | OUTPATIENT
Start: 2021-08-06 | End: 2021-09-20

## 2021-08-06 RX ORDER — FLUTICASONE PROPIONATE AND SALMETEROL 500; 50 UG/1; UG/1
1 POWDER RESPIRATORY (INHALATION) 2 TIMES DAILY
Qty: 180 EACH | Refills: 0 | Status: SHIPPED | OUTPATIENT
Start: 2021-08-06

## 2021-08-06 NOTE — TELEPHONE ENCOUNTER
Spoke with pt and verified he has been taking levothyroxine 200mcg daily.    He is not alternating any doses   Looks like he was supposed to continue alternating the doses based on his last TSH but for whatever reason he stopped doing that based on what the

## 2021-08-06 NOTE — TELEPHONE ENCOUNTER
Patient needs to complete labs ASAP TSH and free T4 since taking 200 mcg daily may be hyperthyroid. Will only give 30-day refill    Has appointment scheduled 8/30/2021 but needs to complete prior to appointment.   Thank you

## 2021-08-06 NOTE — TELEPHONE ENCOUNTER
Pt'w wife called in and made appt for 8/30/21  Refills provided for albuterol and daily inhaler wixela

## 2021-08-11 ENCOUNTER — NURSE ONLY (OUTPATIENT)
Dept: FAMILY MEDICINE CLINIC | Facility: CLINIC | Age: 46
End: 2021-08-11

## 2021-08-11 DIAGNOSIS — E03.9 HYPOTHYROIDISM IN ADULT: ICD-10-CM

## 2021-08-11 LAB
T4 FREE SERPL-MCNC: 1.1 NG/DL (ref 0.8–1.7)
TSI SER-ACNC: 2.07 MIU/ML (ref 0.36–3.74)

## 2021-08-11 PROCEDURE — 84443 ASSAY THYROID STIM HORMONE: CPT | Performed by: FAMILY MEDICINE

## 2021-08-11 PROCEDURE — 36415 COLL VENOUS BLD VENIPUNCTURE: CPT | Performed by: FAMILY MEDICINE

## 2021-08-11 PROCEDURE — 84439 ASSAY OF FREE THYROXINE: CPT | Performed by: FAMILY MEDICINE

## 2021-08-11 NOTE — PROGRESS NOTES
Patient came in for draw of ordered non-fasting labs. Patient drawn out of left AC, x 1 attempt and tolerated well. 1 lt grn tube drawn.

## 2021-08-29 DIAGNOSIS — E03.9 HYPOTHYROIDISM IN ADULT: ICD-10-CM

## 2021-08-30 RX ORDER — LEVOTHYROXINE SODIUM 0.2 MG/1
TABLET ORAL
Qty: 30 TABLET | Refills: 0 | Status: SHIPPED | OUTPATIENT
Start: 2021-08-30 | End: 2021-09-16

## 2021-08-30 NOTE — TELEPHONE ENCOUNTER
Pt requesting refill of   Requested Prescriptions     Pending Prescriptions Disp Refills   • LEVOTHYROXINE 200 MCG Oral Tab [Pharmacy Med Name: LEVOTHYROXINE 200 MCG TABLET] 30 tablet 0     Sig: TAKE 1 TAB EVERY MORNING 30 MIN BEFORE BREAKFAST         Pa

## 2021-09-03 ENCOUNTER — IMMUNIZATION (OUTPATIENT)
Dept: LAB | Facility: HOSPITAL | Age: 46
End: 2021-09-03
Attending: EMERGENCY MEDICINE
Payer: COMMERCIAL

## 2021-09-03 DIAGNOSIS — Z23 NEED FOR VACCINATION: Primary | ICD-10-CM

## 2021-09-03 PROCEDURE — 0012A SARSCOV2 VAC 100MCG/0.5ML IM: CPT

## 2021-09-05 ENCOUNTER — PATIENT MESSAGE (OUTPATIENT)
Dept: FAMILY MEDICINE CLINIC | Facility: CLINIC | Age: 46
End: 2021-09-05

## 2021-09-05 DIAGNOSIS — E03.9 HYPOTHYROIDISM IN ADULT: ICD-10-CM

## 2021-09-07 NOTE — TELEPHONE ENCOUNTER
From: Radha Loving  Sent: 9/7/2021 10:13 AM CDT  To: [facility] Clinical Staff  Subject: RE: Test Results Question    Can u send me a pic of the order when I go to [facility] please

## 2021-09-14 DIAGNOSIS — E03.9 HYPOTHYROIDISM IN ADULT: ICD-10-CM

## 2021-09-15 DIAGNOSIS — E03.9 HYPOTHYROIDISM IN ADULT: ICD-10-CM

## 2021-09-15 RX ORDER — LEVOTHYROXINE SODIUM 0.2 MG/1
200 TABLET ORAL
Qty: 30 TABLET | Refills: 0 | OUTPATIENT
Start: 2021-09-15

## 2021-09-15 NOTE — TELEPHONE ENCOUNTER
Pt requesting refill of   Requested Prescriptions     Pending Prescriptions Disp Refills   • levothyroxine 200 MCG Oral Tab 30 tablet 0     Sig: Take 1 tablet (200 mcg total) by mouth before breakfast.         Protocol failed, unable to approve, due to

## 2021-09-16 DIAGNOSIS — E03.9 HYPOTHYROIDISM IN ADULT: ICD-10-CM

## 2021-09-16 RX ORDER — LEVOTHYROXINE SODIUM 0.2 MG/1
200 TABLET ORAL
Qty: 30 TABLET | Refills: 0 | Status: CANCELLED | OUTPATIENT
Start: 2021-09-16

## 2021-09-16 RX ORDER — LEVOTHYROXINE SODIUM 0.2 MG/1
200 TABLET ORAL
Qty: 30 TABLET | Refills: 0 | Status: SHIPPED | OUTPATIENT
Start: 2021-09-16 | End: 2021-10-11

## 2021-09-16 RX ORDER — LEVOTHYROXINE SODIUM 0.2 MG/1
200 TABLET ORAL
Qty: 30 TABLET | Refills: 0 | OUTPATIENT
Start: 2021-09-16

## 2021-09-16 NOTE — TELEPHONE ENCOUNTER
Pt wife Radha Zhang called because levothyroxine refill request was denied. Informed it was denied because it was requested too soon. She states Redd Latham misplaced his medication and would like a refill of medication until appt 10/11/21.     Pt did try to obta

## 2021-09-16 NOTE — TELEPHONE ENCOUNTER
Refill requested too soon:  Pt requesting refill of   Requested Prescriptions     Pending Prescriptions Disp Refills   • levothyroxine 200 MCG Oral Tab 30 tablet 0     Sig: Take 1 tablet (200 mcg total) by mouth before breakfast.         Last Time Medica

## 2021-09-17 DIAGNOSIS — J45.40 MODERATE PERSISTENT ASTHMA WITHOUT COMPLICATION: ICD-10-CM

## 2021-09-20 RX ORDER — ALBUTEROL SULFATE 90 UG/1
AEROSOL, METERED RESPIRATORY (INHALATION)
Qty: 6.7 EACH | Refills: 0 | Status: SHIPPED | OUTPATIENT
Start: 2021-09-20 | End: 2021-10-07

## 2021-09-20 NOTE — TELEPHONE ENCOUNTER
Passed Protocol: Pt requesting refill of   Requested Prescriptions     Pending Prescriptions Disp Refills   • ALBUTEROL SULFATE  (90 Base) MCG/ACT Inhalation Aero Soln [Pharmacy Med Name: ALBUTEROL HFA (PROVENTIL) INH] 6.7 each 0     Sig: INHALE 2

## 2021-09-25 ENCOUNTER — LAB ENCOUNTER (OUTPATIENT)
Dept: LAB | Facility: HOSPITAL | Age: 46
End: 2021-09-25
Attending: FAMILY MEDICINE
Payer: COMMERCIAL

## 2021-09-25 DIAGNOSIS — E78.2 MIXED HYPERLIPIDEMIA: ICD-10-CM

## 2021-09-25 LAB
CHOLEST SERPL-MCNC: 188 MG/DL (ref ?–200)
HDLC SERPL-MCNC: 39 MG/DL (ref 40–59)
LDLC SERPL CALC-MCNC: 108 MG/DL (ref ?–100)
NONHDLC SERPL-MCNC: 149 MG/DL (ref ?–130)
PATIENT FASTING Y/N/NP: YES
TRIGL SERPL-MCNC: 234 MG/DL (ref 30–149)
VLDLC SERPL CALC-MCNC: 40 MG/DL (ref 0–30)

## 2021-09-25 PROCEDURE — 36415 COLL VENOUS BLD VENIPUNCTURE: CPT

## 2021-09-25 PROCEDURE — 80061 LIPID PANEL: CPT

## 2021-09-26 DIAGNOSIS — E03.9 HYPOTHYROIDISM IN ADULT: ICD-10-CM

## 2021-09-27 RX ORDER — LEVOTHYROXINE SODIUM 175 UG/1
TABLET ORAL
Qty: 52 TABLET | Refills: 0 | OUTPATIENT
Start: 2021-09-27

## 2021-10-05 ENCOUNTER — TELEPHONE (OUTPATIENT)
Dept: FAMILY MEDICINE CLINIC | Facility: CLINIC | Age: 46
End: 2021-10-05

## 2021-10-05 NOTE — TELEPHONE ENCOUNTER
Pts spouse called office stating pt might have had an accident with a nail at work, and pt wants to know when his last tetanus shot was.

## 2021-10-05 NOTE — TELEPHONE ENCOUNTER
Called pt's wife and let her know last Tdap 2/20/2014  She is not sure extent of injury to finger   Told her if he needs evaluation or treatment they will often times re-vaccinate with Tdap anyways but he has had one within the last 10 years.  She voiced un

## 2021-10-07 DIAGNOSIS — J45.40 MODERATE PERSISTENT ASTHMA WITHOUT COMPLICATION: ICD-10-CM

## 2021-10-07 RX ORDER — ALBUTEROL SULFATE 90 UG/1
AEROSOL, METERED RESPIRATORY (INHALATION)
Qty: 6.7 EACH | Refills: 0 | Status: SHIPPED | OUTPATIENT
Start: 2021-10-07

## 2021-10-07 NOTE — TELEPHONE ENCOUNTER
Refill Passed Protocol:     Pt requesting refill of   Requested Prescriptions     Pending Prescriptions Disp Refills   • ALBUTEROL 108 (90 Base) MCG/ACT Inhalation Aero Soln [Pharmacy Med Name: ALBUTEROL HFA (PROVENTIL) INH] 6.7 each 0     Sig: INHALE 2 PU

## 2021-10-11 ENCOUNTER — OFFICE VISIT (OUTPATIENT)
Dept: FAMILY MEDICINE CLINIC | Facility: CLINIC | Age: 46
End: 2021-10-11

## 2021-10-11 VITALS
HEIGHT: 73 IN | SYSTOLIC BLOOD PRESSURE: 126 MMHG | RESPIRATION RATE: 16 BRPM | HEART RATE: 78 BPM | WEIGHT: 285.63 LBS | TEMPERATURE: 99 F | DIASTOLIC BLOOD PRESSURE: 80 MMHG | BODY MASS INDEX: 37.86 KG/M2 | OXYGEN SATURATION: 97 %

## 2021-10-11 DIAGNOSIS — E78.2 MIXED HYPERLIPIDEMIA: Primary | ICD-10-CM

## 2021-10-11 DIAGNOSIS — Z23 NEED FOR VACCINATION: ICD-10-CM

## 2021-10-11 DIAGNOSIS — E03.9 HYPOTHYROIDISM IN ADULT: ICD-10-CM

## 2021-10-11 DIAGNOSIS — Z12.11 SCREEN FOR COLON CANCER: ICD-10-CM

## 2021-10-11 PROCEDURE — 90686 IIV4 VACC NO PRSV 0.5 ML IM: CPT | Performed by: FAMILY MEDICINE

## 2021-10-11 PROCEDURE — 3008F BODY MASS INDEX DOCD: CPT | Performed by: FAMILY MEDICINE

## 2021-10-11 PROCEDURE — 99214 OFFICE O/P EST MOD 30 MIN: CPT | Performed by: FAMILY MEDICINE

## 2021-10-11 PROCEDURE — 3074F SYST BP LT 130 MM HG: CPT | Performed by: FAMILY MEDICINE

## 2021-10-11 PROCEDURE — 90471 IMMUNIZATION ADMIN: CPT | Performed by: FAMILY MEDICINE

## 2021-10-11 PROCEDURE — 3079F DIAST BP 80-89 MM HG: CPT | Performed by: FAMILY MEDICINE

## 2021-10-11 RX ORDER — LEVOTHYROXINE SODIUM 0.2 MG/1
200 TABLET ORAL
Qty: 90 TABLET | Refills: 1 | Status: SHIPPED | OUTPATIENT
Start: 2021-10-11

## 2021-10-11 NOTE — PROGRESS NOTES
I was so behind and I could not answer when you called. I wonder if you are in class.   CHIEF COMPLAINT: Patient presents with:  Medication Follow-Up: thyroid medication    HPI:     Stevienu Bright is a 54year old male presents for medication monitoring-hyp child   • Tongue biting age 10    stitches      Past Surgical History:   Procedure Laterality Date   • MYRINGOTOMY, LASER-ASSISTED Bilateral infant , age 4      Family History   Problem Relation Age of Onset   • High Cholesterol Father    • Heart Disease Fa Omeprazole              OTHER (SEE COMMENTS)    Comment:Wife states pt had pancreatitis  Pollen                        PHYSICAL EXAM:   /80 (BP Location: Left arm, Patient Position: Sitting, Cuff Size: large)   Pulse 78   Temp 98.8 °F (37.1 °C) (T 06/30/2021   Component Date Value   • Vitamin B12 06/30/2021 473    • Cholesterol, Total 06/30/2021 186    • HDL Cholesterol 06/30/2021 35*   • Triglycerides 06/30/2021 419*- not fasting   • LDL Cholesterol 06/30/2021 83    • VLDL 06/30/2021 68*   • Non HD Take 1 tablet (200 mcg total) by mouth before breakfast.  Dispense: 90 tablet; Refill: 1  - TSH+FREE T4; Future- today ordered  Appears euthymic   Continue levothyroxine 200 mcg  Continue in a.m. 30 minutes before bedtime    2.  Mixed hyperlipidemia  - LIPI side effects or complications from the treatments as a result of today.      Problem List:   Patient Active Problem List:     YO on CPAP     Moderate persistent asthma     Tobacco dependence     Vitamin D deficiency     Vitamin B 12 deficiency     Hypothyr

## 2021-10-23 DIAGNOSIS — E03.9 HYPOTHYROIDISM IN ADULT: ICD-10-CM

## 2021-10-25 DIAGNOSIS — E03.9 HYPOTHYROIDISM IN ADULT: ICD-10-CM

## 2021-10-25 RX ORDER — LEVOTHYROXINE SODIUM 175 UG/1
TABLET ORAL
Qty: 52 TABLET | Refills: 0 | OUTPATIENT
Start: 2021-10-25

## 2021-10-25 NOTE — TELEPHONE ENCOUNTER
Duplicate Refill:      Pt requesting refill of   Requested Prescriptions     Pending Prescriptions Disp Refills   • LEVOTHYROXINE 175 MCG Oral Tab [Pharmacy Med Name: LEVOTHYROXINE 175 MCG TABLET] 52 tablet 0     Sig: TAKE 1 TAB BY MOUTH BEFORE BREAKFAST

## 2021-10-26 ENCOUNTER — HOSPITAL ENCOUNTER (EMERGENCY)
Facility: HOSPITAL | Age: 46
Discharge: LEFT BEFORE DISCHARGE | End: 2021-10-26
Payer: COMMERCIAL

## 2021-10-26 ENCOUNTER — APPOINTMENT (OUTPATIENT)
Dept: CT IMAGING | Facility: HOSPITAL | Age: 46
End: 2021-10-26
Attending: EMERGENCY MEDICINE
Payer: COMMERCIAL

## 2021-10-26 ENCOUNTER — APPOINTMENT (OUTPATIENT)
Dept: GENERAL RADIOLOGY | Facility: HOSPITAL | Age: 46
End: 2021-10-26
Attending: EMERGENCY MEDICINE
Payer: COMMERCIAL

## 2021-10-26 ENCOUNTER — HOSPITAL ENCOUNTER (OUTPATIENT)
Facility: HOSPITAL | Age: 46
Setting detail: OBSERVATION
Discharge: HOME | End: 2021-10-27
Attending: EMERGENCY MEDICINE | Admitting: HOSPITALIST
Payer: COMMERCIAL

## 2021-10-26 DIAGNOSIS — D72.829 LEUKOCYTOSIS, UNSPECIFIED TYPE: ICD-10-CM

## 2021-10-26 DIAGNOSIS — N23 RENAL COLIC: Primary | ICD-10-CM

## 2021-10-26 PROCEDURE — 99244 OFF/OP CNSLTJ NEW/EST MOD 40: CPT | Performed by: UROLOGY

## 2021-10-26 PROCEDURE — 99219 INITIAL OBSERVATION CARE,LEVL II: CPT | Performed by: HOSPITALIST

## 2021-10-26 PROCEDURE — 74176 CT ABD & PELVIS W/O CONTRAST: CPT | Performed by: EMERGENCY MEDICINE

## 2021-10-26 PROCEDURE — 74019 RADEX ABDOMEN 2 VIEWS: CPT | Performed by: EMERGENCY MEDICINE

## 2021-10-26 RX ORDER — SODIUM CHLORIDE 9 MG/ML
INJECTION, SOLUTION INTRAVENOUS CONTINUOUS
Status: DISCONTINUED | OUTPATIENT
Start: 2021-10-26 | End: 2021-10-27

## 2021-10-26 RX ORDER — ALBUTEROL SULFATE 90 UG/1
2 AEROSOL, METERED RESPIRATORY (INHALATION) EVERY 4 HOURS PRN
Status: DISCONTINUED | OUTPATIENT
Start: 2021-10-26 | End: 2021-10-27

## 2021-10-26 RX ORDER — ONDANSETRON 2 MG/ML
4 INJECTION INTRAMUSCULAR; INTRAVENOUS EVERY 6 HOURS PRN
Status: DISCONTINUED | OUTPATIENT
Start: 2021-10-26 | End: 2021-10-27

## 2021-10-26 RX ORDER — ACETAMINOPHEN 325 MG/1
650 TABLET ORAL EVERY 6 HOURS PRN
Status: DISCONTINUED | OUTPATIENT
Start: 2021-10-26 | End: 2021-10-27

## 2021-10-26 RX ORDER — KETOROLAC TROMETHAMINE 15 MG/ML
15 INJECTION, SOLUTION INTRAMUSCULAR; INTRAVENOUS ONCE
Status: COMPLETED | OUTPATIENT
Start: 2021-10-26 | End: 2021-10-26

## 2021-10-26 RX ORDER — KETOROLAC TROMETHAMINE 15 MG/ML
15 INJECTION, SOLUTION INTRAMUSCULAR; INTRAVENOUS EVERY 6 HOURS PRN
Status: DISCONTINUED | OUTPATIENT
Start: 2021-10-26 | End: 2021-10-27

## 2021-10-26 RX ORDER — MORPHINE SULFATE 2 MG/ML
1 INJECTION, SOLUTION INTRAMUSCULAR; INTRAVENOUS EVERY 2 HOUR PRN
Status: DISCONTINUED | OUTPATIENT
Start: 2021-10-26 | End: 2021-10-27

## 2021-10-26 RX ORDER — MORPHINE SULFATE 4 MG/ML
4 INJECTION, SOLUTION INTRAMUSCULAR; INTRAVENOUS ONCE
Status: COMPLETED | OUTPATIENT
Start: 2021-10-26 | End: 2021-10-26

## 2021-10-26 RX ORDER — MORPHINE SULFATE 4 MG/ML
4 INJECTION, SOLUTION INTRAMUSCULAR; INTRAVENOUS EVERY 2 HOUR PRN
Status: DISCONTINUED | OUTPATIENT
Start: 2021-10-26 | End: 2021-10-27

## 2021-10-26 RX ORDER — PROCHLORPERAZINE EDISYLATE 5 MG/ML
5 INJECTION INTRAMUSCULAR; INTRAVENOUS EVERY 8 HOURS PRN
Status: DISCONTINUED | OUTPATIENT
Start: 2021-10-26 | End: 2021-10-27

## 2021-10-26 RX ORDER — LEVOTHYROXINE SODIUM 0.2 MG/1
200 TABLET ORAL
Qty: 90 TABLET | Refills: 1 | OUTPATIENT
Start: 2021-10-26

## 2021-10-26 RX ORDER — TAMSULOSIN HYDROCHLORIDE 0.4 MG/1
0.4 CAPSULE ORAL NIGHTLY
Status: DISCONTINUED | OUTPATIENT
Start: 2021-10-26 | End: 2021-10-27

## 2021-10-26 RX ORDER — ONDANSETRON 2 MG/ML
4 INJECTION INTRAMUSCULAR; INTRAVENOUS ONCE
Status: COMPLETED | OUTPATIENT
Start: 2021-10-26 | End: 2021-10-26

## 2021-10-26 RX ORDER — LEVOTHYROXINE SODIUM 0.1 MG/1
200 TABLET ORAL
Status: DISCONTINUED | OUTPATIENT
Start: 2021-10-27 | End: 2021-10-27

## 2021-10-26 RX ORDER — TEMAZEPAM 15 MG/1
15 CAPSULE ORAL NIGHTLY PRN
Status: DISCONTINUED | OUTPATIENT
Start: 2021-10-26 | End: 2021-10-27

## 2021-10-26 RX ORDER — MORPHINE SULFATE 2 MG/ML
2 INJECTION, SOLUTION INTRAMUSCULAR; INTRAVENOUS EVERY 2 HOUR PRN
Status: DISCONTINUED | OUTPATIENT
Start: 2021-10-26 | End: 2021-10-27

## 2021-10-26 RX ORDER — MONTELUKAST SODIUM 10 MG/1
10 TABLET ORAL NIGHTLY
Status: DISCONTINUED | OUTPATIENT
Start: 2021-10-26 | End: 2021-10-27

## 2021-10-26 NOTE — TELEPHONE ENCOUNTER
Duplicate Refill:      Pt requesting refill of   Requested Prescriptions     Pending Prescriptions Disp Refills   • levothyroxine 200 MCG Oral Tab 90 tablet 1     Sig: Take 1 tablet (200 mcg total) by mouth before breakfast.        Last Time Medication w

## 2021-10-26 NOTE — CONSULTS
[facility] HOSP - [facility]    Report of Consultation    John Christine Patient Status:  Emergency    1975 MRN [id number]   Location [address] Attending Terrie Hernandez MD   Hosp Day # 0 PCP Saleem Reyes, DO     Date HISTORY  Sister has had kidney stones. Grandmother  of lung cancer. Father  of colon cancer.       HISTORY:  Past Medical History:   Diagnosis Date   • Concussion age 14    LOC for 1 hour   • Current every day smoker    • GERD (gastroesophageal re ROS:   Genitourinary:  Negative for dysuria and hematuria  Gastrointestinal: Positive for left lower quadrant pain radiating to the left groin,, positive for constipation, positive for decreased appetite, positive for nausea and vomiting  Neurolo palpation  BLADDER--unremarkable  Normal secondary sexual characteristics and normal pubic hair distribution.     INGUINAL--no inguinal lymphadenopathy or inguinal hernias appreciated  TESTES  =  descended bilaterally without nodules and no orchitis or epid MD on 10/26/2021 at 9:58 AM             10/26/2021 [facility]  Small 3 mm distal left ureteral–vesical junction calculus. Moderate constipation. Small bowel ileus.        Cardiac  No significant cardiac testing in the last 5 years    Review of previo anesthesia. Answered their questions on treatment. They understand. Patient and wife decided that patient wants to try to pass the stone on his own; he was in the same situation 3 years ago and he successfully passed the stone.   I explained to them and

## 2021-10-26 NOTE — ED PROVIDER NOTES
Patient Seen in: [facility] AND [facility] Emergency Department      History   Patient presents with:  Abdomen/Flank Pain    Stated Complaint: kidney stone    Subjective:   HPI    Pt is 55 yo M who p/w left flank pain that started at 330 am. Rates 7/10.  Starts i following components:       Result Value    Glucose 147 (*)     Sodium 135 (*)     BUN 21 (*)     All other components within normal limits   URINALYSIS WITH CULTURE REFLEX - Abnormal; Notable for the following components:    Clarity Urine Cloudy (*)     B including need for follow up       Medical Record Review: I personally reviewed available prior medical records for any recent pertinent discharge summaries, testing, and procedures and reviewed those reports. Critical Care:   I spent a total of 60 minut

## 2021-10-26 NOTE — PROGRESS NOTES
Therapeutic interchange from fluticasone-salmeterol (WIXELA INHUB) 500-50 MCG/DOSE 1 puff BID    to fluticasone furoate-vilanterol (BREO ELLIPTA) 200-25 MCG/INH  1 puff daily per P&T approved protocol.      Stanley Simmonds, MariellaD

## 2021-10-26 NOTE — H&P
[facility]    PATIENT'S NAME: Deedee Kamranr   ATTENDING PHYSICIAN: Catrina Dc MD   PATIENT ACCOUNT#:   [id number]    LOCATION:  [place] [address]  MEDICAL RECORD #:   [id number]       YOB: 1975  ADMISSION DATE:       10/2 EXAMINATION:    GENERAL:  Alert, oriented to time, place, and person. Mild to moderate distress. VITAL SIGNS:  Temperature 97.8, pulse 60, respiratory rate 18, blood pressure 133/77, pulse oximetry 97% on room air. HEENT:  Atraumatic. Oropharynx clear.

## 2021-10-26 NOTE — ED NOTES
Orders for admission, patient is aware of plan and ready to go upstairs. Any questions, please call ED RN [address]  at extension [phone].     Type of COVID test sent:rapid  COVID Suspicion level: Low     Titratable drug(s) infusing:  Rate:    LOC at time of tra

## 2021-10-27 ENCOUNTER — APPOINTMENT (OUTPATIENT)
Dept: GENERAL RADIOLOGY | Facility: HOSPITAL | Age: 46
End: 2021-10-27
Attending: NURSE PRACTITIONER
Payer: COMMERCIAL

## 2021-10-27 VITALS
TEMPERATURE: 99 F | OXYGEN SATURATION: 95 % | DIASTOLIC BLOOD PRESSURE: 64 MMHG | RESPIRATION RATE: 20 BRPM | BODY MASS INDEX: 36.77 KG/M2 | SYSTOLIC BLOOD PRESSURE: 143 MMHG | HEIGHT: 74 IN | WEIGHT: 286.5 LBS | HEART RATE: 78 BPM

## 2021-10-27 PROCEDURE — 99212 OFFICE O/P EST SF 10 MIN: CPT | Performed by: NURSE PRACTITIONER

## 2021-10-27 PROCEDURE — 74019 RADEX ABDOMEN 2 VIEWS: CPT | Performed by: NURSE PRACTITIONER

## 2021-10-27 PROCEDURE — 99226 SUBSEQUENT OBSERVATION CARE: CPT | Performed by: HOSPITALIST

## 2021-10-27 RX ORDER — HYDROCODONE BITARTRATE AND ACETAMINOPHEN 5; 325 MG/1; MG/1
1-2 TABLET ORAL EVERY 6 HOURS PRN
Qty: 20 TABLET | Refills: 0 | Status: SHIPPED | OUTPATIENT
Start: 2021-10-27 | End: 2022-02-07

## 2021-10-27 RX ORDER — TAMSULOSIN HYDROCHLORIDE 0.4 MG/1
0.4 CAPSULE ORAL DAILY
Qty: 30 CAPSULE | Refills: 0 | Status: SHIPPED | OUTPATIENT
Start: 2021-10-27 | End: 2021-11-18

## 2021-10-27 NOTE — PLAN OF CARE
Monitoring vital signs. Pain medication as needed. No acute changes at this time. Safety measures maintained. Fall precautions in place. Bed in lowest position. Call light within reach.      Problem: Patient Centered Care  Goal: Patient preferences are iden Implement non-pharmacological measures as appropriate and evaluate response  - Consider cultural and social influences on pain and pain management  - Manage/alleviate anxiety  - Utilize distraction and/or relaxation techniques  - Monitor for opioid side ef Progressing     Problem: GASTROINTESTINAL - ADULT  Goal: Minimal or absence of nausea and vomiting  Description: INTERVENTIONS:  - Maintain adequate hydration with IV or PO as ordered and tolerated  - Nasogastric tube to low intermittent suction as ordered

## 2021-10-27 NOTE — PROGRESS NOTES
[facility]  Urology Consult Follow-Up Note    Rosanna Coronado Patient Status:  Observation    1975 MRN [id number]   Location [facility]5W Attending Ovidio Christensen MD   Hosp Day # 0 PCP Brandy Hawk DO 3. Stable cortical scarring in the inferior right renal pole.     Dictated by (CST): Jada Westbrook MD on 10/26/2021 at 9:54 AM     Finalized by (CST): Jada Westbrook MD on 10/26/2021 at 9:58 AM          XR ABDOMEN 2 VIEWS(CPT=74019)    Result Date: he develops fevers he should return to the ER. He will follow up next week in the office with a KUB prior. Patient was agreeable. All questions and concerns were addressed. Ariana Ma, APRN  10/27/2021

## 2021-10-27 NOTE — PROGRESS NOTES
[facility]    Progress Note    [facility] Patient Status:  Observation    1975 MRN [id number]   Location [facility]W Attending Felix Gonzalez MD   Hosp Day # 0 PCP Jamie Magdaleno DO     Chief Complaint: left flank pa or pyelonephritis; correlate clinically. No perinephric fluid collection. 2. Stable moderate hepatic steatosis. 3. Stable cortical scarring in the inferior right renal pole.     Dictated by (CST): Caryl Vega MD on 10/26/2021 at 9:54 AM     Finalize breakfast  montelukast (SINGULAIR) tab 10 mg, 10 mg, Oral, Nightly  Ketorolac Tromethamine (TORADOL) injection 15 mg, 15 mg, Intravenous, Q6H PRN  tamsulosin (FLOMAX) cap 0.4 mg, 0.4 mg, Oral, Nightly          Assessment  Patient Active Problem List:     O

## 2021-10-27 NOTE — PLAN OF CARE
Pt in stable condition. Minimal pain managed with PRN torodol. Pt and wife state he wants to try and pass naturally. Urology paged with updates and cleared for discharged. Strainer and discharge instructions given to pt and discussed with wife via phone.  D discharge resources and transportation as appropriate  - Identify discharge learning needs (meds, wound care, etc)  - Arrange for interpreters to assist at discharge as needed  - Consider post-discharge preferences of patient/family/discharge partner  - Co needed  - Follow urinary retention protocol/standard of care  - Consider collaborating with pharmacy to review patient's medication profile  - Implement strategies to promote bladder emptying  Outcome: Completed     Problem: METABOLIC/FLUID AND ELECTROLYTE

## 2021-11-01 ENCOUNTER — TELEPHONE (OUTPATIENT)
Dept: SURGERY | Facility: CLINIC | Age: 46
End: 2021-11-01

## 2021-11-01 DIAGNOSIS — Z87.442 HISTORY OF KIDNEY STONES: Primary | ICD-10-CM

## 2021-11-01 NOTE — TELEPHONE ENCOUNTER
Called wife back and left message to advise her to have pt continue taking medication for now. Also advising pt to make follow-up appt with [facility] for next available which is for mid-December to discuss pathology results and also discuss the recurring kidney stones.

## 2021-11-01 NOTE — TELEPHONE ENCOUNTER
Pt's wife returned the call. Pt is in no pain. Passed the kidney stone. Should pt continue taking the medication flomax. Going to drop off the stone at the lab. Does pt need to be seen by Dr. Ananda De Guzman. Please leave a detailed message.

## 2021-11-01 NOTE — TELEPHONE ENCOUNTER
Left wife message stating that an order will be put in for stone to be analyzed for cytology. Asked if pt can call us back for update on status to determine when f/u appointment should be given. Quick Note:    Telephone Information:  Mobile [phone]    Patient informed of Dr. Melba Freitas note. Patient verbalized understanding and agrees with plan.  Pt wants Dr. Lucrecia Rasheed to know that he is aware that his liver labs are abnormal. He followed up with his l

## 2021-11-01 NOTE — TELEPHONE ENCOUNTER
Per pt's spouse, pt was seen by MD in hospital for kidney stone. Pt has passed stone and is unsure what they are supposed to do with sample. Please advise, states detailed VM can be left.

## 2021-11-18 RX ORDER — TAMSULOSIN HYDROCHLORIDE 0.4 MG/1
0.4 CAPSULE ORAL DAILY
Qty: 30 CAPSULE | Refills: 0 | Status: SHIPPED | OUTPATIENT
Start: 2021-11-18 | End: 2021-12-18

## 2021-11-18 NOTE — TELEPHONE ENCOUNTER
See TE 11-1-21      Benign Prostatic Hypertrophy Medications      Protocol Criteria:  • Appointment scheduled in the past 12 months or in the next 2 months  • If patients is between the ages of 48 and 79 then PSA done within the last 2 years and is either

## 2021-11-27 DIAGNOSIS — E03.9 HYPOTHYROIDISM IN ADULT: ICD-10-CM

## 2021-11-27 DIAGNOSIS — J30.89 ENVIRONMENTAL AND SEASONAL ALLERGIES: ICD-10-CM

## 2021-11-27 DIAGNOSIS — J45.40 MODERATE PERSISTENT ASTHMA WITHOUT COMPLICATION: ICD-10-CM

## 2021-11-29 RX ORDER — LEVOTHYROXINE SODIUM 0.2 MG/1
200 TABLET ORAL
Qty: 90 TABLET | Refills: 1 | OUTPATIENT
Start: 2021-11-29

## 2021-11-29 RX ORDER — MONTELUKAST SODIUM 10 MG/1
TABLET ORAL
Qty: 90 TABLET | Refills: 1 | Status: SHIPPED | OUTPATIENT
Start: 2021-11-29

## 2021-11-29 RX ORDER — LEVOTHYROXINE SODIUM 175 UG/1
TABLET ORAL
Qty: 52 TABLET | Refills: 0 | OUTPATIENT
Start: 2021-11-29

## 2021-11-29 NOTE — TELEPHONE ENCOUNTER
Refill requested too soon:     Pt requesting refill of   Requested Prescriptions     Pending Prescriptions Disp Refills   • levothyroxine 200 MCG Oral Tab 90 tablet 1     Sig: Take 1 tablet (200 mcg total) by mouth before breakfast.         Last Time Medic

## 2021-11-29 NOTE — TELEPHONE ENCOUNTER
Refill Passed Protocol:     Pt requesting refill of   Requested Prescriptions     Pending Prescriptions Disp Refills   • montelukast 10 MG Oral Tab 90 tablet 1     Sig: TAKE 1 TABLET BY MOUTH EVERY DAY AT NIGHT     AAP 01/14/21    Refill was approved and s

## 2021-11-29 NOTE — TELEPHONE ENCOUNTER
Requested Prescriptions     Pending Prescriptions Disp Refills   • LEVOTHYROXINE 175 MCG Oral Tab [Pharmacy Med Name: LEVOTHYROXINE 175 MCG TABLET] 52 tablet 0     Sig: TAKE 1 TAB BY MOUTH BEFORE BREAKFAST SATURDAY, [address],     Medication

## 2022-01-05 NOTE — DISCHARGE SUMMARY
[facility] - [facility]    Discharge Summary    Rosanna Coronado Patient Status:  Observation    1975 MRN [id number]   Location [facility]5W Attending No att. providers found   Hosp Day # 0 PCP Brandy Hawk DO     Date of Admission: 10/ evidence of infection. Chemistry was unremarkable.   CT scan of the abdomen showed obstructing 4 mm stone at the left ureterovesical junction, mild resulting hydroureteronephrosis on the left with perinephritic fat stranding that may relate to edema or magaly · HYDROcodone-acetaminophen 5-325 MG Tabs             Dragan Craft MD  1/4/2022  8:31 PM    Greater than 30 minutes spent on preparation and coordination of this discharge

## 2022-01-21 ENCOUNTER — PATIENT MESSAGE (OUTPATIENT)
Dept: FAMILY MEDICINE CLINIC | Facility: CLINIC | Age: 47
End: 2022-01-21

## 2022-01-21 NOTE — TELEPHONE ENCOUNTER
Patient due for OV and needs to schedule apt.  Please advice if you would like to order labs for pt to complete prior to OV or if labs will be completed at [address] [place] [address]

## 2022-01-22 NOTE — TELEPHONE ENCOUNTER
There is a lipid panel ordered 10/11/2021 to repeat his cholesterol which needs to be done fasting and his colon cancer screening. I am not sure what other labs he is referring to.   Typically his annual labs for physical are drawn at the appointment and h

## 2022-01-24 ENCOUNTER — PATIENT MESSAGE (OUTPATIENT)
Dept: FAMILY MEDICINE CLINIC | Facility: CLINIC | Age: 47
End: 2022-01-24

## 2022-01-24 DIAGNOSIS — R53.83 OTHER FATIGUE: Primary | ICD-10-CM

## 2022-01-24 NOTE — TELEPHONE ENCOUNTER
Pts wife Robson Blake who is on FYI called the office stating that the nurse did not answer their question in regards to the lab they want done for pt. Please call them back to discuss further what they are requesting.

## 2022-01-24 NOTE — TELEPHONE ENCOUNTER
Pt concerned about testosterone level. Has apt scheduled for physical 02/08/22.  Wants to know if he can draw for testosterone level prior to apt    Please advice Yes

## 2022-01-25 ENCOUNTER — PATIENT MESSAGE (OUTPATIENT)
Dept: FAMILY MEDICINE CLINIC | Facility: CLINIC | Age: 47
End: 2022-01-25

## 2022-02-07 ENCOUNTER — LAB ENCOUNTER (OUTPATIENT)
Dept: LAB | Facility: HOSPITAL | Age: 47
End: 2022-02-07
Attending: FAMILY MEDICINE
Payer: COMMERCIAL

## 2022-02-07 DIAGNOSIS — E03.9 HYPOTHYROIDISM IN ADULT: ICD-10-CM

## 2022-02-07 DIAGNOSIS — R53.83 OTHER FATIGUE: ICD-10-CM

## 2022-02-07 DIAGNOSIS — Z12.5 SCREENING FOR PROSTATE CANCER: ICD-10-CM

## 2022-02-07 DIAGNOSIS — E53.8 VITAMIN B 12 DEFICIENCY: ICD-10-CM

## 2022-02-07 PROCEDURE — 84443 ASSAY THYROID STIM HORMONE: CPT

## 2022-02-07 PROCEDURE — 84403 ASSAY OF TOTAL TESTOSTERONE: CPT

## 2022-02-07 PROCEDURE — 82607 VITAMIN B-12: CPT

## 2022-02-07 PROCEDURE — 84270 ASSAY OF SEX HORMONE GLOBUL: CPT

## 2022-02-07 PROCEDURE — 36415 COLL VENOUS BLD VENIPUNCTURE: CPT

## 2022-02-07 PROCEDURE — 84402 ASSAY OF FREE TESTOSTERONE: CPT

## 2022-02-07 PROCEDURE — 84439 ASSAY OF FREE THYROXINE: CPT

## 2022-02-08 ENCOUNTER — OFFICE VISIT (OUTPATIENT)
Dept: FAMILY MEDICINE CLINIC | Facility: CLINIC | Age: 47
End: 2022-02-08
Payer: COMMERCIAL

## 2022-02-08 VITALS
TEMPERATURE: 98 F | RESPIRATION RATE: 16 BRPM | BODY MASS INDEX: 36.69 KG/M2 | WEIGHT: 276.81 LBS | HEIGHT: 73 IN | OXYGEN SATURATION: 97 % | HEART RATE: 70 BPM

## 2022-02-08 DIAGNOSIS — Z12.11 SCREEN FOR COLON CANCER: ICD-10-CM

## 2022-02-08 DIAGNOSIS — E03.9 HYPOTHYROIDISM IN ADULT: ICD-10-CM

## 2022-02-08 DIAGNOSIS — Z13.29 SCREENING FOR ENDOCRINE, NUTRITIONAL, METABOLIC AND IMMUNITY DISORDER: ICD-10-CM

## 2022-02-08 DIAGNOSIS — M79.672 INTRACTABLE LEFT HEEL PAIN: ICD-10-CM

## 2022-02-08 DIAGNOSIS — Z00.00 ANNUAL PHYSICAL EXAM: Primary | ICD-10-CM

## 2022-02-08 DIAGNOSIS — Z13.0 SCREENING FOR ENDOCRINE, NUTRITIONAL, METABOLIC AND IMMUNITY DISORDER: ICD-10-CM

## 2022-02-08 DIAGNOSIS — Z13.21 SCREENING FOR ENDOCRINE, NUTRITIONAL, METABOLIC AND IMMUNITY DISORDER: ICD-10-CM

## 2022-02-08 DIAGNOSIS — J45.40 MODERATE PERSISTENT ASTHMA WITHOUT COMPLICATION: ICD-10-CM

## 2022-02-08 DIAGNOSIS — Z99.89 OSA ON CPAP: ICD-10-CM

## 2022-02-08 DIAGNOSIS — J30.89 ENVIRONMENTAL AND SEASONAL ALLERGIES: ICD-10-CM

## 2022-02-08 DIAGNOSIS — G47.33 OSA ON CPAP: ICD-10-CM

## 2022-02-08 DIAGNOSIS — E78.2 MIXED HYPERLIPIDEMIA: ICD-10-CM

## 2022-02-08 DIAGNOSIS — R53.83 OTHER FATIGUE: ICD-10-CM

## 2022-02-08 DIAGNOSIS — E53.8 VITAMIN B 12 DEFICIENCY: ICD-10-CM

## 2022-02-08 DIAGNOSIS — Z12.5 SCREENING FOR PROSTATE CANCER: ICD-10-CM

## 2022-02-08 DIAGNOSIS — Z13.228 SCREENING FOR ENDOCRINE, NUTRITIONAL, METABOLIC AND IMMUNITY DISORDER: ICD-10-CM

## 2022-02-08 LAB
COMPLEXED PSA SERPL-MCNC: 0.15 NG/ML (ref ?–4)
T4 FREE SERPL-MCNC: 1.2 NG/DL (ref 0.8–1.7)
TSI SER-ACNC: 1.48 MIU/ML (ref 0.36–3.74)
VIT B12 SERPL-MCNC: 376 PG/ML (ref 193–986)

## 2022-02-08 PROCEDURE — 3008F BODY MASS INDEX DOCD: CPT | Performed by: FAMILY MEDICINE

## 2022-02-08 PROCEDURE — 99396 PREV VISIT EST AGE 40-64: CPT | Performed by: FAMILY MEDICINE

## 2022-02-08 RX ORDER — ALBUTEROL SULFATE 90 UG/1
2 AEROSOL, METERED RESPIRATORY (INHALATION) EVERY 4 HOURS PRN
Qty: 6.7 EACH | Refills: 1 | Status: SHIPPED | OUTPATIENT
Start: 2022-02-08

## 2022-02-08 RX ORDER — CELECOXIB 200 MG/1
200 CAPSULE ORAL DAILY
Qty: 20 CAPSULE | Refills: 0 | Status: SHIPPED | OUTPATIENT
Start: 2022-02-08

## 2022-02-08 RX ORDER — MONTELUKAST SODIUM 10 MG/1
TABLET ORAL
Qty: 90 TABLET | Refills: 1 | Status: SHIPPED | OUTPATIENT
Start: 2022-02-08 | End: 2022-02-09

## 2022-02-09 PROBLEM — M79.672 INTRACTABLE LEFT HEEL PAIN: Status: ACTIVE | Noted: 2022-02-09

## 2022-02-09 PROBLEM — R53.83 OTHER FATIGUE: Status: ACTIVE | Noted: 2022-02-09

## 2022-02-09 RX ORDER — MONTELUKAST SODIUM 10 MG/1
TABLET ORAL
Qty: 90 TABLET | Refills: 1 | OUTPATIENT
Start: 2022-02-09

## 2022-02-09 RX ORDER — CYANOCOBALAMIN (VITAMIN B-12) 1000 MCG
1000 TABLET ORAL DAILY
Qty: 90 TABLET | Refills: 3 | COMMUNITY
Start: 2022-02-09 | End: 2022-03-11

## 2022-02-09 RX ORDER — MONTELUKAST SODIUM 10 MG/1
TABLET ORAL
Qty: 90 TABLET | Refills: 3 | Status: SHIPPED | OUTPATIENT
Start: 2022-02-09

## 2022-02-09 RX ORDER — LEVOTHYROXINE SODIUM 0.2 MG/1
200 TABLET ORAL
Qty: 90 TABLET | Refills: 3 | Status: SHIPPED | OUTPATIENT
Start: 2022-02-09 | End: 2022-03-02

## 2022-02-12 PROBLEM — R79.89 LOW TESTOSTERONE IN MALE: Status: ACTIVE | Noted: 2022-02-12

## 2022-02-12 PROBLEM — R68.82 LIBIDO, DECREASED: Status: ACTIVE | Noted: 2022-02-12

## 2022-02-12 LAB
TESTOSTERONE TOTAL: 159.1 NG/DL
TESTOSTERONE, FREE -MS/MS: 30 PG/ML

## 2022-02-14 ENCOUNTER — OFFICE VISIT (OUTPATIENT)
Dept: SURGERY | Facility: CLINIC | Age: 47
End: 2022-02-14
Payer: COMMERCIAL

## 2022-02-14 ENCOUNTER — LAB ENCOUNTER (OUTPATIENT)
Dept: LAB | Facility: HOSPITAL | Age: 47
End: 2022-02-14
Attending: UROLOGY
Payer: COMMERCIAL

## 2022-02-14 DIAGNOSIS — Z13.21 SCREENING FOR ENDOCRINE, NUTRITIONAL, METABOLIC AND IMMUNITY DISORDER: ICD-10-CM

## 2022-02-14 DIAGNOSIS — Z13.29 SCREENING FOR ENDOCRINE, NUTRITIONAL, METABOLIC AND IMMUNITY DISORDER: ICD-10-CM

## 2022-02-14 DIAGNOSIS — Z13.0 SCREENING FOR ENDOCRINE, NUTRITIONAL, METABOLIC AND IMMUNITY DISORDER: ICD-10-CM

## 2022-02-14 DIAGNOSIS — N23 RENAL COLIC: ICD-10-CM

## 2022-02-14 DIAGNOSIS — E29.1 HYPOGONADISM IN MALE: ICD-10-CM

## 2022-02-14 DIAGNOSIS — R82.90 URINE FINDING: Primary | ICD-10-CM

## 2022-02-14 DIAGNOSIS — Z13.228 SCREENING FOR ENDOCRINE, NUTRITIONAL, METABOLIC AND IMMUNITY DISORDER: ICD-10-CM

## 2022-02-14 DIAGNOSIS — N20.0 RECURRENT KIDNEY STONES: ICD-10-CM

## 2022-02-14 DIAGNOSIS — Z00.00 ANNUAL PHYSICAL EXAM: ICD-10-CM

## 2022-02-14 DIAGNOSIS — D72.829 LEUKOCYTOSIS, UNSPECIFIED TYPE: ICD-10-CM

## 2022-02-14 LAB
ALBUMIN/GLOB SERPL: 1.5 {RATIO} (ref 1–2)
ALP LIVER SERPL-CCNC: 84 U/L
ALT SERPL-CCNC: 39 U/L
ANION GAP SERPL CALC-SCNC: 9 MMOL/L (ref 0–18)
APPEARANCE: CLEAR
AST SERPL-CCNC: 18 U/L (ref 15–37)
BASOPHILS # BLD AUTO: 0.09 X10(3) UL (ref 0–0.2)
BASOPHILS NFR BLD AUTO: 0.7 %
BILIRUB SERPL-MCNC: 0.3 MG/DL (ref 0.1–2)
BILIRUBIN: NEGATIVE
BUN BLD-MCNC: 16 MG/DL (ref 7–18)
CALCIUM BLD-MCNC: 9.3 MG/DL (ref 8.5–10.1)
CHLORIDE SERPL-SCNC: 107 MMOL/L (ref 98–112)
CO2 SERPL-SCNC: 25 MMOL/L (ref 21–32)
CREAT BLD-MCNC: 0.94 MG/DL
EOSINOPHIL # BLD AUTO: 0.43 X10(3) UL (ref 0–0.7)
EOSINOPHIL NFR BLD AUTO: 3.2 %
ERYTHROCYTE [DISTWIDTH] IN BLOOD BY AUTOMATED COUNT: 13.3 %
EST. AVERAGE GLUCOSE BLD GHB EST-MCNC: 114 MG/DL (ref 68–126)
ESTRADIOL SERPL-MCNC: 24.6 PG/ML
FASTING STATUS PATIENT QL REPORTED: NO
GLOBULIN PLAS-MCNC: 2.9 G/DL (ref 2.8–4.4)
GLUCOSE (URINE DIPSTICK): NEGATIVE MG/DL
HBA1C MFR BLD: 5.6 % (ref ?–5.7)
HCT VFR BLD AUTO: 46.1 %
HGB BLD-MCNC: 16.2 G/DL
IMM GRANULOCYTES # BLD AUTO: 0.12 X10(3) UL (ref 0–1)
IMM GRANULOCYTES NFR BLD: 0.9 %
KETONES (URINE DIPSTICK): NEGATIVE MG/DL
LEUKOCYTES: NEGATIVE
LYMPHOCYTES # BLD AUTO: 3.44 X10(3) UL (ref 1–4)
LYMPHOCYTES NFR BLD AUTO: 25.3 %
MCH RBC QN AUTO: 29.9 PG (ref 26–34)
MCHC RBC AUTO-ENTMCNC: 35.1 G/DL (ref 31–37)
MCV RBC AUTO: 85.1 FL
MONOCYTES # BLD AUTO: 1.02 X10(3) UL (ref 0.1–1)
MONOCYTES NFR BLD AUTO: 7.5 %
MULTISTIX LOT#: ABNORMAL NUMERIC
NEUTROPHILS # BLD AUTO: 8.48 X10 (3) UL (ref 1.5–7.7)
NEUTROPHILS # BLD AUTO: 8.48 X10(3) UL (ref 1.5–7.7)
NEUTROPHILS NFR BLD AUTO: 62.4 %
NITRITE, URINE: NEGATIVE
OSMOLALITY SERPL CALC.SUM OF ELEC: 296 MOSM/KG (ref 275–295)
PH, URINE: 5 (ref 4.5–8)
PHOSPHATE SERPL-MCNC: 3.5 MG/DL (ref 2.5–4.9)
PLATELET # BLD AUTO: 237 10(3)UL (ref 150–450)
POTASSIUM SERPL-SCNC: 3.6 MMOL/L (ref 3.5–5.1)
PROT SERPL-MCNC: 7.2 G/DL (ref 6.4–8.2)
PROTEIN (URINE DIPSTICK): NEGATIVE MG/DL
RBC # BLD AUTO: 5.42 X10(6)UL
SODIUM SERPL-SCNC: 141 MMOL/L (ref 136–145)
SPECIFIC GRAVITY: 1.02 (ref 1–1.03)
TESTOST SERPL-MCNC: 141.52 NG/DL
URINE-COLOR: YELLOW
UROBILINOGEN,SEMI-QN: 0.2 MG/DL (ref 0–1.9)
VIT D+METAB SERPL-MCNC: 17.2 NG/ML (ref 30–100)
WBC # BLD AUTO: 13.6 X10(3) UL (ref 4–11)

## 2022-02-14 PROCEDURE — 83036 HEMOGLOBIN GLYCOSYLATED A1C: CPT

## 2022-02-14 PROCEDURE — 84100 ASSAY OF PHOSPHORUS: CPT

## 2022-02-14 PROCEDURE — 85025 COMPLETE CBC W/AUTO DIFF WBC: CPT

## 2022-02-14 PROCEDURE — 82670 ASSAY OF TOTAL ESTRADIOL: CPT | Performed by: UROLOGY

## 2022-02-14 PROCEDURE — 80053 COMPREHEN METABOLIC PANEL: CPT

## 2022-02-14 PROCEDURE — 84403 ASSAY OF TOTAL TESTOSTERONE: CPT | Performed by: UROLOGY

## 2022-02-14 PROCEDURE — 81003 URINALYSIS AUTO W/O SCOPE: CPT | Performed by: UROLOGY

## 2022-02-14 PROCEDURE — 82306 VITAMIN D 25 HYDROXY: CPT

## 2022-02-14 PROCEDURE — 36415 COLL VENOUS BLD VENIPUNCTURE: CPT

## 2022-02-14 PROCEDURE — 99244 OFF/OP CNSLTJ NEW/EST MOD 40: CPT | Performed by: UROLOGY

## 2022-02-14 RX ORDER — TESTOSTERONE CYPIONATE 100 MG/ML
100 INJECTION, SOLUTION INTRAMUSCULAR
Qty: 4 ML | Refills: 5 | Status: SHIPPED | OUTPATIENT
Start: 2022-02-14 | End: 2022-08-13

## 2022-02-14 NOTE — PATIENT INSTRUCTIONS
Natural Ways to Boost Testosterone:    1. Exercise/Maintaining Ideal Body Weight  - Exercise at least three times per week, at least 30 minutes per session.  - Resistance training in particular (weight lifting) has been shown to increase T levels. Cardio exercise (jogging, elliptical, bike riding) is also important for heart health. - Obesity can lead to excessive conversion of circulating testosterone to estrogen. 2. Healthy diet  - Focus on lean protein, complex carbohydrates, heart-healthy fats (avocados, olive oil)  - Avoid simple carbohydrates, processed foods    3. Get enough quality sleep & try to go to bed and wake up around the same time every day  - Most adults need around 7-9 hours sleep every night  - One study showed men who slept < 5 hours per night had a 10-15% lower T level compared to when they had a full night's sleep. - Stick to a sleep schedule (regular bedtime and wake up time) as T secretion can decrease if this fluctuates regularly. 4. Minimize Stress  - High cortisol levels (the hormone produced during stress) leads to a reduction in circulating T.    5. Avoid Excessive Alcohol or Illicit Drugs    6.  Regular Ejaculation

## 2022-02-15 ENCOUNTER — TELEPHONE (OUTPATIENT)
Dept: SURGERY | Facility: CLINIC | Age: 47
End: 2022-02-15

## 2022-02-15 NOTE — TELEPHONE ENCOUNTER
RN called [facility] to follow up on the Testosterone if patient already  the medication or does it need PA. Per Christin Rmaos, they are currently ordering the medication. Only syringes are available. No PA needed to initiate.

## 2022-02-16 ENCOUNTER — PATIENT MESSAGE (OUTPATIENT)
Dept: FAMILY MEDICINE CLINIC | Facility: CLINIC | Age: 47
End: 2022-02-16

## 2022-02-17 ENCOUNTER — TELEPHONE (OUTPATIENT)
Dept: FAMILY MEDICINE CLINIC | Facility: CLINIC | Age: 47
End: 2022-02-17

## 2022-02-17 NOTE — TELEPHONE ENCOUNTER
Please inform patient his WBC count in 7 months has been elevated on several CBCs at least 4x, different CBCs since June 2021. Notes hospitalized 10/26/2021 to 10/61/3390 for renal colic and noted leukocytosis. Now stating 2/14 2022 had URI. June 30 of 2021 was a screening physical CBC. May repeat his blood count in a month but if it is elevated,  he must complete consult with hematology. As far as vitamin D-he should go with the advice of his urologist due to his kidney stones.     Please inform          Component      Latest Ref Rng & Units 2/14/2022 10/27/2021 10/26/2021 6/30/2021   WBC      4.0 - 11.0 x10(3) uL 13.6 (H) 15.1 (H) 17.0 (H) 11.6 (H)   RBC      4.30 - 5.70 x10(6)uL 5.42 5.11 5.72 (H) 5.62   Hemoglobin      13.0 - 17.5 g/dL 16.2 15.3 17.1 17.0   Hematocrit      39.0 - 53.0 % 46.1 43.8 49.5 48.6   Platelet Count      265.1 - 450.0 10(3)uL 237.0 192.0 251.0 244.0   MCV      80.0 - 100.0 fL 85.1 85.7 86.5 86.5   MCH      26.0 - 34.0 pg 29.9 29.9 29.9 30.2   MCHC      31.0 - 37.0 g/dL 35.1 34.9 34.5 35.0   RDW      % 13.3 12.6 12.8 13.2   RDW-SD      35.1 - 46.3 fL  39.4 40.4 40.8   Prelim Neutrophil Abs      1.50 - 7.70 x10 (3) uL 8.48 (H) 10.94 (H) 13.20 (H) 6.06   Neutrophils Absolute      1.50 - 7.70 x10(3) uL 8.48 (H) 10.94 (H) 13.20 (H) 6.06   Lymphocytes Absolute      1.00 - 4.00 x10(3) uL 3.44 2.02 2.20 3.48   Monocytes Absolute      0.10 - 1.00 x10(3) uL 1.02 (H) 1.98 (H) 1.17 (H) 1.31 (H)   Eosinophils Absolute      0.00 - 0.70 x10(3) uL 0.43 0.10 0.12 0.53   Basophils Absolute      0.00 - 0.20 x10(3) uL 0.09 0.02 0.10 0.08   Immature Granulocyte Absolute      0.00 - 1.00 x10(3) uL 0.12 0.07 0.19 0.09   Neutrophils %      % 62.4 72.2 77.7 52.5   Lymphocytes %      % 25.3 13.4 13.0 30.1   Monocytes %      % 7.5 13.1 6.9 11.3   Eosinophils %      % 3.2 0.7 0.7 4.6   Basophils %      % 0.7 0.1 0.6 0.7   Immature Granulocyte %      % 0.9 0.5 1.1 0.8     Component      Latest Ref Rng & Units 5/4/2019 8/12/2018   WBC      4.0 - 11.0 x10(3) uL 9.1 10.5   RBC      4.30 - 5.70 x10(6)uL 5.69 5.63   Hemoglobin      13.0 - 17.5 g/dL 16.7 16.9   Hematocrit      39.0 - 53.0 % 48.6 48.4   Platelet Count      092.0 - 450.0 10(3)uL 221.0 227.0   MCV      80.0 - 100.0 fL 85.4 86.0   MCH      26.0 - 34.0 pg 29.3 30.0   MCHC      31.0 - 37.0 g/dL 34.4 34.9   RDW      % 13.3 13.3   RDW-SD      35.1 - 46.3 fL 41.1 41.3   Prelim Neutrophil Abs      1.50 - 7.70 x10 (3) uL 4.63 5.13   Neutrophils Absolute      1.50 - 7.70 x10(3) uL 4.63 5.13   Lymphocytes Absolute      1.00 - 4.00 x10(3) uL 2.91 3.80   Monocytes Absolute      0.10 - 1.00 x10(3) uL 1.10 (H) 0.98   Eosinophils Absolute      0.00 - 0.70 x10(3) uL 0.31 0.46 (H)   Basophils Absolute      0.00 - 0.20 x10(3) uL 0.07 0.07   Immature Granulocyte Absolute      0.00 - 1.00 x10(3) uL 0.08 0.07   Neutrophils %      % 50.8 48.7   Lymphocytes %      % 32.0 36.2   Monocytes %      % 12.1 9.3   Eosinophils %      % 3.4 4.4   Basophils %      % 0.8 0.7   Immature Granulocyte %      % 0.9 0.7

## 2022-02-17 NOTE — TELEPHONE ENCOUNTER
Please pt Service[employer] message encounter 02/17/2022 Presents to triage carried by parents with complaints of fever since Friday night up to 103.  Nasal congestion, sneezing, nasal drainage, pulling at bilateral ears, denies prior ear infection, infrequent cough.  Decrease appetite for the past few days.  Denies vomiting diarrhea.  No known exposure to known illnesses.  Vaccinations up to date.  Alert tracking activity in room, interactive with parents.  More fussy, less consistent sleep.

## 2022-02-17 NOTE — TELEPHONE ENCOUNTER
Pts spouse mayra who is on the FYI called office asking for a referral to GI with [facility] with dr. Lisbet Sneed. Pts spouse also wants to speak to a nurse In regards to his vitamins. Pt and spouse is confused to the directions given to them by Dr Soo Luque and specialist for the kidney stones.

## 2022-02-17 NOTE — TELEPHONE ENCOUNTER
Spoke to BODØ, pt wife and informed of indications per . PERCY voiced understanding states she is an oncology nurse and understanding the need to consult with hematology. State patient has been under  A lot of stress and has been sick recently with a cold and before with the kidney stones when completing the blood work. Informed WBC elevated in June as well for pt physical no illness reported at that time. Aware pt to repeat labs in a month, if WBC results continue to be elevated pt must complete consult with Hematology.

## 2022-02-17 NOTE — TELEPHONE ENCOUNTER
From: Rosalinda Colon  To: Juan Miguel Bang DO  Sent: 2/16/2022 7:35 PM CST  Subject: Vit d / wbc     I saw the urologist on Monday . I am not suppose to take vit d / vit c bc of my kidney stones , stopped in October per urologist .   My WBC were high during kidney stones , decrease since October . My family and I started feeling a bit under the weather this week when my WBCs were drawn . Should I have my labs redrawn next month ? Suppose to start testerone shots next week with urology .    Ty

## 2022-02-24 ENCOUNTER — NURSE ONLY (OUTPATIENT)
Dept: SURGERY | Facility: CLINIC | Age: 47
End: 2022-02-24
Payer: COMMERCIAL

## 2022-02-24 DIAGNOSIS — R79.89 LOW TESTOSTERONE IN MALE: Primary | ICD-10-CM

## 2022-02-24 NOTE — PROGRESS NOTES
Patient is here with wife, who is a nurse for [employer]. He brought the Testosterone supplies, such as the vial and the 22G needles and the vial.     RN first identified the patient and the purpose of his visit. RN instructed patient to prepare all the supplies first, remove the vial of the testosterone from the box, draw air into the syringe by pulling the plunger back to the line that marks the dose he needs to give (1ml), put the needle through the stopper and push the air into the vial then draw back the plunger to the correct linden (1ml). He verbalized understanding. RN demonstrated to patient first the greater trochanter of the femur and also showed him the anatomical picture of the thigh. RN explained that the top border of the vastus lateralis muscle can be found a hand width below the groin; the bottom border of the vastus lateralis muscle is found a hand above the knee. RN also explained the squeeze the muscle before injection to increase the thickness and depth. Patient was able to locate and demonstrate this as well. Warned him not to advance the needle deep to touch the bone. Patient is aware. RN continue to instruct the patient to clean the injection site with alcohol using a circular motion and allow it to dry. RN explained to quickly insert the needle straight into the muscle at a 90 degree angle but aspirate before injection, pull the plunger back to check for blood in the syringe and patient verbalized understanding. Patient's wife was able to verbally explained and demonstrated the injection process herself. First dose given. RN instructed patient to monitor site for swelling and bruising, to apply ice packs if needed and Ibuprofen for any pain/discomfort. He verbalized understanding and agreeable to plans. All questions answered.

## 2022-03-02 ENCOUNTER — PATIENT MESSAGE (OUTPATIENT)
Dept: FAMILY MEDICINE CLINIC | Facility: CLINIC | Age: 47
End: 2022-03-02

## 2022-03-02 RX ORDER — LEVOTHYROXINE SODIUM 0.2 MG/1
200 TABLET ORAL
Qty: 10 TABLET | Refills: 0 | Status: SHIPPED | OUTPATIENT
Start: 2022-03-02

## 2022-03-02 NOTE — TELEPHONE ENCOUNTER
Pt called office asking for a refill on his thyroid medication for his vacation in [facility]. Please look at pts mychart msg.

## 2022-03-02 NOTE — TELEPHONE ENCOUNTER
Pt jamesggage lost at [facility] where pt had medication   Requesting a temp refill for his days in travel   Rx pended for review

## 2022-03-03 ENCOUNTER — PATIENT MESSAGE (OUTPATIENT)
Dept: FAMILY MEDICINE CLINIC | Facility: CLINIC | Age: 47
End: 2022-03-03

## 2022-03-03 NOTE — TELEPHONE ENCOUNTER
From: Lina Sharma  Sent: 3/3/2022 1:11 PM CST  To: [facility] Clinical Staff  Subject: Thyroid medication please     Hi , can the medicine be called into [facility]  Ty

## 2022-04-05 ENCOUNTER — TELEPHONE (OUTPATIENT)
Dept: FAMILY MEDICINE CLINIC | Facility: CLINIC | Age: 47
End: 2022-04-05

## 2022-04-05 NOTE — TELEPHONE ENCOUNTER
Insurance prefers Advair Diskus inhaler over [employer]. Spoke with patient stating he is not sure if he has been on Advair in the past. It looked like patient was taking David Balo previously and likely switched because insurance does not cover David Balo. Patient plans to speak to wife about inhalers and will contact me if interested in switching to Advair. Patient denies any other questions or concerns with medications at this time.

## 2022-04-05 NOTE — TELEPHONE ENCOUNTER
Reached patient's wife, Maryjo Akers (ok per HIPAA) to discuss inhalers. Wife requests that I contact patient on other number as he would be better to address inhalers.

## 2022-04-13 ENCOUNTER — PATIENT MESSAGE (OUTPATIENT)
Dept: FAMILY MEDICINE CLINIC | Facility: CLINIC | Age: 47
End: 2022-04-13

## 2022-04-14 NOTE — TELEPHONE ENCOUNTER
From: Justyn Jack  To:  Mercy Burger DO  Sent: 4/13/2022 6:11 PM CDT  Subject: Labs     Please let the doctor know I am going Saturday morning for my cbc   Ty  Claudy

## 2022-04-16 ENCOUNTER — LAB ENCOUNTER (OUTPATIENT)
Dept: LAB | Facility: HOSPITAL | Age: 47
End: 2022-04-16
Attending: FAMILY MEDICINE
Payer: COMMERCIAL

## 2022-04-16 DIAGNOSIS — D72.819 LEUKOPENIA, UNSPECIFIED TYPE: ICD-10-CM

## 2022-04-16 DIAGNOSIS — E78.2 MIXED HYPERLIPIDEMIA: ICD-10-CM

## 2022-04-16 LAB
BASOPHILS # BLD AUTO: 0.09 X10(3) UL (ref 0–0.2)
BASOPHILS NFR BLD AUTO: 1 %
CHOLEST SERPL-MCNC: 184 MG/DL (ref ?–200)
EOSINOPHIL # BLD AUTO: 0.56 X10(3) UL (ref 0–0.7)
EOSINOPHIL NFR BLD AUTO: 5.9 %
ERYTHROCYTE [DISTWIDTH] IN BLOOD BY AUTOMATED COUNT: 14 %
FASTING PATIENT LIPID ANSWER: YES
HCT VFR BLD AUTO: 51 %
HDLC SERPL-MCNC: 32 MG/DL (ref 40–59)
HGB BLD-MCNC: 17.4 G/DL
IMM GRANULOCYTES # BLD AUTO: 0.1 X10(3) UL (ref 0–1)
IMM GRANULOCYTES NFR BLD: 1.1 %
LDLC SERPL CALC-MCNC: 108 MG/DL (ref ?–100)
LYMPHOCYTES # BLD AUTO: 2.79 X10(3) UL (ref 1–4)
LYMPHOCYTES NFR BLD AUTO: 29.5 %
MCH RBC QN AUTO: 30.1 PG (ref 26–34)
MCHC RBC AUTO-ENTMCNC: 34.1 G/DL (ref 31–37)
MCV RBC AUTO: 88.2 FL
MONOCYTES # BLD AUTO: 1.25 X10(3) UL (ref 0.1–1)
MONOCYTES NFR BLD AUTO: 13.2 %
NEUTROPHILS # BLD AUTO: 4.68 X10 (3) UL (ref 1.5–7.7)
NEUTROPHILS # BLD AUTO: 4.68 X10(3) UL (ref 1.5–7.7)
NEUTROPHILS NFR BLD AUTO: 49.3 %
NONHDLC SERPL-MCNC: 152 MG/DL (ref ?–130)
PLATELET # BLD AUTO: 211 10(3)UL (ref 150–450)
RBC # BLD AUTO: 5.78 X10(6)UL
TRIGL SERPL-MCNC: 257 MG/DL (ref 30–149)
VLDLC SERPL CALC-MCNC: 44 MG/DL (ref 0–30)
WBC # BLD AUTO: 9.5 X10(3) UL (ref 4–11)

## 2022-04-16 PROCEDURE — 80061 LIPID PANEL: CPT

## 2022-04-16 PROCEDURE — 85025 COMPLETE CBC W/AUTO DIFF WBC: CPT

## 2022-04-16 PROCEDURE — 36415 COLL VENOUS BLD VENIPUNCTURE: CPT

## 2022-04-19 ENCOUNTER — TELEPHONE (OUTPATIENT)
Dept: SURGERY | Facility: CLINIC | Age: 47
End: 2022-04-19

## 2022-04-19 NOTE — TELEPHONE ENCOUNTER
Wife Kelsie Osborne called. Pt was going to have labs done next month for [facility], but PCP ordered labs 4/16/2022. Wife is asking for [facility] to review them. She would also like to know if pt should have labs redone next month as scheduled. Pt is on Testosterone. I discussed that [facility] is not in the office today, but he will return tomorrow 4/20/2022. Wife stated she will be at work all day and she would prefer the replay to be though [facility].

## 2022-04-19 NOTE — TELEPHONE ENCOUNTER
Pt's wife called. Pt received the hemoglobin results from the primary care physician. Want to discuss the results. Call after 12 noon.

## 2022-04-20 NOTE — TELEPHONE ENCOUNTER
His RBC count is just slightly elevated but not concerning right now. If he feels well he can f/u with me as planned with T labs prior - should be 3 mo visit after starting T. If he would like he can get T labs sooner and we can add him on for visit to review - I'm OK adding on for Friday or next week some time but needs labs done prior.  thx

## 2022-05-09 ENCOUNTER — LAB ENCOUNTER (OUTPATIENT)
Dept: LAB | Facility: HOSPITAL | Age: 47
End: 2022-05-09
Attending: UROLOGY
Payer: COMMERCIAL

## 2022-05-09 ENCOUNTER — OFFICE VISIT (OUTPATIENT)
Dept: SURGERY | Facility: CLINIC | Age: 47
End: 2022-05-09
Payer: COMMERCIAL

## 2022-05-09 DIAGNOSIS — E29.1 HYPOGONADISM IN MALE: Primary | ICD-10-CM

## 2022-05-09 DIAGNOSIS — N20.0 RECURRENT KIDNEY STONES: ICD-10-CM

## 2022-05-09 DIAGNOSIS — N52.9 ERECTILE DYSFUNCTION, UNSPECIFIED ERECTILE DYSFUNCTION TYPE: ICD-10-CM

## 2022-05-09 DIAGNOSIS — E29.1 HYPOGONADISM IN MALE: ICD-10-CM

## 2022-05-09 LAB
APPEARANCE: CLEAR
BILIRUBIN: NEGATIVE
GLUCOSE (URINE DIPSTICK): NEGATIVE MG/DL
KETONES (URINE DIPSTICK): NEGATIVE MG/DL
LEUKOCYTES: NEGATIVE
MULTISTIX LOT#: ABNORMAL NUMERIC
NITRITE, URINE: NEGATIVE
PH, URINE: 6 (ref 4.5–8)
PROTEIN (URINE DIPSTICK): NEGATIVE MG/DL
SPECIFIC GRAVITY: 1.02 (ref 1–1.03)
TESTOST SERPL-MCNC: 372.91 NG/DL
URINE-COLOR: YELLOW
UROBILINOGEN,SEMI-QN: 0.2 MG/DL (ref 0–1.9)

## 2022-05-09 PROCEDURE — 84403 ASSAY OF TOTAL TESTOSTERONE: CPT

## 2022-05-09 PROCEDURE — 81003 URINALYSIS AUTO W/O SCOPE: CPT | Performed by: UROLOGY

## 2022-05-09 PROCEDURE — 36415 COLL VENOUS BLD VENIPUNCTURE: CPT

## 2022-05-09 PROCEDURE — 99214 OFFICE O/P EST MOD 30 MIN: CPT | Performed by: UROLOGY

## 2022-05-09 RX ORDER — TESTOSTERONE CYPIONATE 100 MG/ML
100 INJECTION, SOLUTION INTRAMUSCULAR
Qty: 4 ML | Refills: 5 | Status: SHIPPED | OUTPATIENT
Start: 2022-05-09 | End: 2022-11-05

## 2022-05-10 ENCOUNTER — TELEPHONE (OUTPATIENT)
Dept: SURGERY | Facility: CLINIC | Age: 47
End: 2022-05-10

## 2022-05-12 ENCOUNTER — APPOINTMENT (OUTPATIENT)
Dept: HEMATOLOGY/ONCOLOGY | Facility: HOSPITAL | Age: 47
End: 2022-05-12
Attending: FAMILY MEDICINE
Payer: COMMERCIAL

## 2022-05-19 ENCOUNTER — OFFICE VISIT (OUTPATIENT)
Dept: FAMILY MEDICINE CLINIC | Facility: CLINIC | Age: 47
End: 2022-05-19
Payer: COMMERCIAL

## 2022-05-19 VITALS
HEART RATE: 81 BPM | BODY MASS INDEX: 38.28 KG/M2 | OXYGEN SATURATION: 96 % | TEMPERATURE: 98 F | RESPIRATION RATE: 16 BRPM | DIASTOLIC BLOOD PRESSURE: 80 MMHG | WEIGHT: 288.81 LBS | HEIGHT: 73 IN | SYSTOLIC BLOOD PRESSURE: 126 MMHG

## 2022-05-19 DIAGNOSIS — Z12.11 SCREEN FOR COLON CANCER: ICD-10-CM

## 2022-05-19 DIAGNOSIS — E78.2 MIXED HYPERLIPIDEMIA: Primary | ICD-10-CM

## 2022-05-19 PROCEDURE — 3079F DIAST BP 80-89 MM HG: CPT | Performed by: FAMILY MEDICINE

## 2022-05-19 PROCEDURE — 99214 OFFICE O/P EST MOD 30 MIN: CPT | Performed by: FAMILY MEDICINE

## 2022-05-19 PROCEDURE — 3008F BODY MASS INDEX DOCD: CPT | Performed by: FAMILY MEDICINE

## 2022-05-19 PROCEDURE — 3074F SYST BP LT 130 MM HG: CPT | Performed by: FAMILY MEDICINE

## 2022-05-19 RX ORDER — ROSUVASTATIN CALCIUM 10 MG/1
10 TABLET, COATED ORAL NIGHTLY
Qty: 90 TABLET | Refills: 0 | Status: SHIPPED | OUTPATIENT
Start: 2022-05-19

## 2022-06-22 ENCOUNTER — OFFICE VISIT (OUTPATIENT)
Dept: FAMILY MEDICINE CLINIC | Facility: CLINIC | Age: 47
End: 2022-06-22
Payer: COMMERCIAL

## 2022-06-22 ENCOUNTER — TELEPHONE (OUTPATIENT)
Dept: FAMILY MEDICINE CLINIC | Facility: CLINIC | Age: 47
End: 2022-06-22

## 2022-06-22 VITALS
TEMPERATURE: 98 F | WEIGHT: 288 LBS | BODY MASS INDEX: 38 KG/M2 | OXYGEN SATURATION: 95 % | SYSTOLIC BLOOD PRESSURE: 134 MMHG | HEART RATE: 79 BPM | RESPIRATION RATE: 20 BRPM | DIASTOLIC BLOOD PRESSURE: 72 MMHG

## 2022-06-22 DIAGNOSIS — L91.8 ACROCHORDON: Primary | ICD-10-CM

## 2022-06-22 PROCEDURE — 3075F SYST BP GE 130 - 139MM HG: CPT | Performed by: FAMILY MEDICINE

## 2022-06-22 PROCEDURE — 99213 OFFICE O/P EST LOW 20 MIN: CPT | Performed by: FAMILY MEDICINE

## 2022-06-22 PROCEDURE — 3078F DIAST BP <80 MM HG: CPT | Performed by: FAMILY MEDICINE

## 2022-07-22 ENCOUNTER — TELEPHONE (OUTPATIENT)
Dept: SURGERY | Facility: CLINIC | Age: 47
End: 2022-07-22

## 2022-07-22 NOTE — TELEPHONE ENCOUNTER
pts hemoglobin has gone up to 17 after taking testosterone - asking if he should cut back on injections to every other week

## 2022-07-25 NOTE — TELEPHONE ENCOUNTER
He would likely have suboptimal response if he takes every other week. If he'd like to bring Hb down I'd recommend he start donating blood at [facility] every couple months.

## 2022-07-25 NOTE — TELEPHONE ENCOUNTER
Per wife returning a call. Per pt please call work phone [phone] and ask for BODØ.  please advise

## 2022-08-20 DIAGNOSIS — E29.1 HYPOGONADISM IN MALE: ICD-10-CM

## 2022-08-21 DIAGNOSIS — E78.2 MIXED HYPERLIPIDEMIA: ICD-10-CM

## 2022-08-22 DIAGNOSIS — E29.1 HYPOGONADISM IN MALE: ICD-10-CM

## 2022-08-27 ENCOUNTER — LAB ENCOUNTER (OUTPATIENT)
Dept: LAB | Facility: HOSPITAL | Age: 47
End: 2022-08-27
Attending: FAMILY MEDICINE
Payer: COMMERCIAL

## 2022-08-27 DIAGNOSIS — E78.2 MIXED HYPERLIPIDEMIA: ICD-10-CM

## 2022-08-27 DIAGNOSIS — E29.1 HYPOGONADISM IN MALE: ICD-10-CM

## 2022-08-27 LAB
ALBUMIN SERPL-MCNC: 3.9 G/DL (ref 3.4–5)
ALP LIVER SERPL-CCNC: 89 U/L
ALT SERPL-CCNC: 31 U/L
AST SERPL-CCNC: 16 U/L (ref 15–37)
BILIRUB DIRECT SERPL-MCNC: 0.2 MG/DL (ref 0–0.2)
BILIRUB SERPL-MCNC: 0.8 MG/DL (ref 0.1–2)
CHOLEST SERPL-MCNC: 137 MG/DL (ref ?–200)
ERYTHROCYTE [DISTWIDTH] IN BLOOD BY AUTOMATED COUNT: 13.3 %
ESTRADIOL SERPL-MCNC: 25.9 PG/ML
FASTING PATIENT LIPID ANSWER: YES
HCT VFR BLD AUTO: 50.3 %
HDLC SERPL-MCNC: 36 MG/DL (ref 40–59)
HGB BLD-MCNC: 17.3 G/DL
LDLC SERPL CALC-MCNC: 68 MG/DL (ref ?–100)
MCH RBC QN AUTO: 29.9 PG (ref 26–34)
MCHC RBC AUTO-ENTMCNC: 34.4 G/DL (ref 31–37)
MCV RBC AUTO: 87 FL
NONHDLC SERPL-MCNC: 101 MG/DL (ref ?–130)
PLATELET # BLD AUTO: 199 10(3)UL (ref 150–450)
PROT SERPL-MCNC: 7.4 G/DL (ref 6.4–8.2)
RBC # BLD AUTO: 5.78 X10(6)UL
TRIGL SERPL-MCNC: 199 MG/DL (ref 30–149)
VLDLC SERPL CALC-MCNC: 30 MG/DL (ref 0–30)
WBC # BLD AUTO: 9.6 X10(3) UL (ref 4–11)

## 2022-08-27 PROCEDURE — 80061 LIPID PANEL: CPT

## 2022-08-27 PROCEDURE — 80076 HEPATIC FUNCTION PANEL: CPT

## 2022-08-27 PROCEDURE — 82670 ASSAY OF TOTAL ESTRADIOL: CPT

## 2022-08-27 PROCEDURE — 85027 COMPLETE CBC AUTOMATED: CPT

## 2022-08-27 PROCEDURE — 36415 COLL VENOUS BLD VENIPUNCTURE: CPT

## 2022-08-29 RX ORDER — TESTOSTERONE CYPIONATE 100 MG/ML
100 INJECTION, SOLUTION INTRAMUSCULAR
Qty: 10 ML | Refills: 0 | Status: SHIPPED | OUTPATIENT
Start: 2022-08-29 | End: 2022-11-14

## 2022-08-29 RX ORDER — TESTOSTERONE CYPIONATE 100 MG/ML
100 INJECTION, SOLUTION INTRAMUSCULAR
Qty: 4 ML | Refills: 5 | OUTPATIENT
Start: 2022-08-29 | End: 2023-02-25

## 2022-08-29 NOTE — TELEPHONE ENCOUNTER
Duplicate request for testosterone. Other request has been sent to [facility] today. Duplicate will be denied at this time.

## 2022-08-31 RX ORDER — ROSUVASTATIN CALCIUM 10 MG/1
TABLET, COATED ORAL
Qty: 90 TABLET | Refills: 0 | OUTPATIENT
Start: 2022-08-31

## 2022-08-31 NOTE — TELEPHONE ENCOUNTER
[facility] received and reviewed by patient not appointment scheduled at this time. Last read by Luis Andres at 8:12 PM on 8/22/2022.   Medication denied

## 2022-09-25 DIAGNOSIS — J45.40 MODERATE PERSISTENT ASTHMA WITHOUT COMPLICATION: ICD-10-CM

## 2022-09-26 RX ORDER — ALBUTEROL SULFATE 90 UG/1
AEROSOL, METERED RESPIRATORY (INHALATION)
Qty: 6.7 EACH | Refills: 1 | Status: SHIPPED | OUTPATIENT
Start: 2022-09-26

## 2022-11-14 ENCOUNTER — OFFICE VISIT (OUTPATIENT)
Dept: SURGERY | Facility: CLINIC | Age: 47
End: 2022-11-14
Payer: COMMERCIAL

## 2022-11-14 DIAGNOSIS — N40.1 BPH WITH OBSTRUCTION/LOWER URINARY TRACT SYMPTOMS: ICD-10-CM

## 2022-11-14 DIAGNOSIS — N13.8 BPH WITH OBSTRUCTION/LOWER URINARY TRACT SYMPTOMS: ICD-10-CM

## 2022-11-14 DIAGNOSIS — E29.1 HYPOGONADISM IN MALE: Primary | ICD-10-CM

## 2022-11-14 DIAGNOSIS — N20.0 RECURRENT KIDNEY STONES: ICD-10-CM

## 2022-11-14 DIAGNOSIS — N52.9 ERECTILE DYSFUNCTION, UNSPECIFIED ERECTILE DYSFUNCTION TYPE: ICD-10-CM

## 2022-11-14 LAB
BILIRUBIN: NEGATIVE
GLUCOSE (URINE DIPSTICK): NEGATIVE MG/DL
KETONES (URINE DIPSTICK): NEGATIVE MG/DL
LEUKOCYTES: NEGATIVE
MULTISTIX LOT#: ABNORMAL NUMERIC
NITRITE, URINE: NEGATIVE
PH, URINE: 7 (ref 4.5–8)
PROTEIN (URINE DIPSTICK): NEGATIVE MG/DL
SPECIFIC GRAVITY: 1.02 (ref 1–1.03)
UROBILINOGEN,SEMI-QN: 0.2 MG/DL (ref 0–1.9)

## 2022-11-14 PROCEDURE — 99214 OFFICE O/P EST MOD 30 MIN: CPT | Performed by: UROLOGY

## 2022-11-14 PROCEDURE — 81002 URINALYSIS NONAUTO W/O SCOPE: CPT | Performed by: UROLOGY

## 2022-11-14 RX ORDER — TESTOSTERONE CYPIONATE 100 MG/ML
100 INJECTION, SOLUTION INTRAMUSCULAR
Qty: 10 ML | Refills: 0 | Status: SHIPPED | OUTPATIENT
Start: 2022-11-14 | End: 2023-05-13

## 2022-11-14 RX ORDER — TADALAFIL 20 MG/1
20 TABLET ORAL
Qty: 30 TABLET | Refills: 5 | Status: SHIPPED | OUTPATIENT
Start: 2022-11-14 | End: 2022-11-14

## 2022-11-14 RX ORDER — TADALAFIL 20 MG/1
20 TABLET ORAL
Qty: 30 TABLET | Refills: 5 | Status: SHIPPED | OUTPATIENT
Start: 2022-11-14

## 2022-11-18 ENCOUNTER — TELEPHONE (OUTPATIENT)
Dept: SURGERY | Facility: CLINIC | Age: 47
End: 2022-11-18

## 2022-11-18 NOTE — TELEPHONE ENCOUNTER
Prior auth incoming fax      Tadalafil (CIALIS) 20 MG Oral Tab, Take 1 tablet (20 mg total) by mouth daily as needed for Erectile Dysfunction. , Disp: 30 tablet, Rfl: 5    Key:  [id number]  [email or web address]

## 2022-11-27 NOTE — TELEPHONE ENCOUNTER
From: Hero Hope  To: Dorothy Tan DO  Sent: 1/21/2022 3:15 PM CST  Subject: Labs     Hi  I have had low testosterone years ago , I feel since the second Covid shot , things have changed with my sexual life .   I did go back to smoking though ( sorry ) 27-Nov-2022 14:22

## 2022-12-26 DIAGNOSIS — E29.1 HYPOGONADISM IN MALE: ICD-10-CM

## 2023-01-08 RX ORDER — SYRINGE WITH NEEDLE, 1 ML 25GX5/8"
SYRINGE, EMPTY DISPOSABLE MISCELLANEOUS
Qty: 12 EACH | Refills: 1 | Status: SHIPPED | OUTPATIENT
Start: 2023-01-08

## 2023-02-11 DIAGNOSIS — E03.9 HYPOTHYROIDISM IN ADULT: ICD-10-CM

## 2023-02-15 RX ORDER — LEVOTHYROXINE SODIUM 0.2 MG/1
200 TABLET ORAL
Qty: 30 TABLET | Refills: 1 | Status: SHIPPED | OUTPATIENT
Start: 2023-02-15

## 2023-02-16 NOTE — TELEPHONE ENCOUNTER
Please call and have them schedule next 30 days will give 30 days with 1 refill of levothyroxine to 100 mcg. Patient's TSH is overdue. He needs to see annually. He is also overdue for his annual physical and labs can be drawn at the appointment. Please have him scheduled for the next 30 to 40 days for his annual visit and he needs to come fasting to the appointment.   Thank you

## 2023-03-02 ENCOUNTER — PATIENT OUTREACH (OUTPATIENT)
Dept: FAMILY MEDICINE CLINIC | Facility: CLINIC | Age: 48
End: 2023-03-02

## 2023-03-22 DIAGNOSIS — E03.9 HYPOTHYROIDISM IN ADULT: ICD-10-CM

## 2023-03-22 RX ORDER — LEVOTHYROXINE SODIUM 0.2 MG/1
200 TABLET ORAL
Qty: 30 TABLET | Refills: 1 | OUTPATIENT
Start: 2023-03-22

## 2023-03-30 DIAGNOSIS — E03.9 HYPOTHYROIDISM IN ADULT: ICD-10-CM

## 2023-03-30 NOTE — TELEPHONE ENCOUNTER
Patient called requesting medication refill for Levothyroxine. Informed he is overdue for annual physical exam. Patient v/u and scheduled appt 5/20/2023.

## 2023-04-02 RX ORDER — LEVOTHYROXINE SODIUM 0.2 MG/1
200 TABLET ORAL
Qty: 30 TABLET | Refills: 1 | Status: SHIPPED | OUTPATIENT
Start: 2023-04-02

## 2023-04-19 NOTE — TELEPHONE ENCOUNTER
Goals are for health maintenance. Can address controlled chronic disease like asthma. If patient is having new fatigue then there are additional labs that may be ordered but it depends on the history and other symptoms.     Ordered testosterone but he angela Deja

## 2023-05-15 ENCOUNTER — OFFICE VISIT (OUTPATIENT)
Dept: SURGERY | Facility: CLINIC | Age: 48
End: 2023-05-15

## 2023-05-15 DIAGNOSIS — N52.9 ERECTILE DYSFUNCTION, UNSPECIFIED ERECTILE DYSFUNCTION TYPE: ICD-10-CM

## 2023-05-15 DIAGNOSIS — N40.1 BPH WITH OBSTRUCTION/LOWER URINARY TRACT SYMPTOMS: ICD-10-CM

## 2023-05-15 DIAGNOSIS — N13.8 BPH WITH OBSTRUCTION/LOWER URINARY TRACT SYMPTOMS: ICD-10-CM

## 2023-05-15 DIAGNOSIS — N20.0 RECURRENT KIDNEY STONES: ICD-10-CM

## 2023-05-15 DIAGNOSIS — E29.1 HYPOGONADISM IN MALE: Primary | ICD-10-CM

## 2023-05-15 DIAGNOSIS — Z12.5 SCREENING PSA (PROSTATE SPECIFIC ANTIGEN): ICD-10-CM

## 2023-05-15 PROCEDURE — 99214 OFFICE O/P EST MOD 30 MIN: CPT | Performed by: UROLOGY

## 2023-05-15 RX ORDER — TADALAFIL 20 MG/1
20 TABLET ORAL
Qty: 30 TABLET | Refills: 5 | Status: SHIPPED | OUTPATIENT
Start: 2023-05-15

## 2023-05-20 ENCOUNTER — OFFICE VISIT (OUTPATIENT)
Dept: FAMILY MEDICINE CLINIC | Facility: CLINIC | Age: 48
End: 2023-05-20
Payer: COMMERCIAL

## 2023-05-20 VITALS
HEIGHT: 72 IN | OXYGEN SATURATION: 95 % | TEMPERATURE: 98 F | WEIGHT: 289.19 LBS | DIASTOLIC BLOOD PRESSURE: 76 MMHG | BODY MASS INDEX: 39.17 KG/M2 | HEART RATE: 64 BPM | RESPIRATION RATE: 22 BRPM | SYSTOLIC BLOOD PRESSURE: 130 MMHG

## 2023-05-20 DIAGNOSIS — Z23 NEED FOR VACCINATION: ICD-10-CM

## 2023-05-20 DIAGNOSIS — Z99.89 OSA ON CPAP: ICD-10-CM

## 2023-05-20 DIAGNOSIS — J30.89 ENVIRONMENTAL AND SEASONAL ALLERGIES: ICD-10-CM

## 2023-05-20 DIAGNOSIS — Z00.00 ANNUAL PHYSICAL EXAM: Primary | ICD-10-CM

## 2023-05-20 DIAGNOSIS — R79.89 LOW TESTOSTERONE IN MALE: ICD-10-CM

## 2023-05-20 DIAGNOSIS — Z13.228 SCREENING FOR ENDOCRINE, NUTRITIONAL, METABOLIC AND IMMUNITY DISORDER: ICD-10-CM

## 2023-05-20 DIAGNOSIS — G47.33 OSA ON CPAP: ICD-10-CM

## 2023-05-20 DIAGNOSIS — E03.9 HYPOTHYROIDISM IN ADULT: ICD-10-CM

## 2023-05-20 DIAGNOSIS — E53.8 VITAMIN B 12 DEFICIENCY: ICD-10-CM

## 2023-05-20 DIAGNOSIS — Z12.5 SCREENING FOR MALIGNANT NEOPLASM OF PROSTATE: ICD-10-CM

## 2023-05-20 DIAGNOSIS — Z13.21 SCREENING FOR ENDOCRINE, NUTRITIONAL, METABOLIC AND IMMUNITY DISORDER: ICD-10-CM

## 2023-05-20 DIAGNOSIS — Z13.0 SCREENING FOR ENDOCRINE, NUTRITIONAL, METABOLIC AND IMMUNITY DISORDER: ICD-10-CM

## 2023-05-20 DIAGNOSIS — Z13.29 SCREENING FOR ENDOCRINE, NUTRITIONAL, METABOLIC AND IMMUNITY DISORDER: ICD-10-CM

## 2023-05-20 DIAGNOSIS — J45.40 MODERATE PERSISTENT ASTHMA WITHOUT COMPLICATION: ICD-10-CM

## 2023-05-20 DIAGNOSIS — E78.2 MIXED HYPERLIPIDEMIA: ICD-10-CM

## 2023-05-20 PROBLEM — D72.829 LEUKOCYTOSIS, UNSPECIFIED TYPE: Status: RESOLVED | Noted: 2021-10-26 | Resolved: 2023-05-20

## 2023-05-20 PROBLEM — Z12.11 SCREEN FOR COLON CANCER: Status: RESOLVED | Noted: 2021-10-11 | Resolved: 2023-05-20

## 2023-05-20 PROBLEM — N23 RENAL COLIC: Status: RESOLVED | Noted: 2021-10-26 | Resolved: 2023-05-20

## 2023-05-20 PROBLEM — D48.5 NEOPLASM OF UNCERTAIN BEHAVIOR OF SKIN: Status: RESOLVED | Noted: 2018-08-20 | Resolved: 2023-05-20

## 2023-05-20 PROBLEM — M79.672 INTRACTABLE LEFT HEEL PAIN: Status: RESOLVED | Noted: 2022-02-09 | Resolved: 2023-05-20

## 2023-05-20 PROBLEM — R53.83 OTHER FATIGUE: Status: RESOLVED | Noted: 2022-02-09 | Resolved: 2023-05-20

## 2023-05-20 PROBLEM — E66.01 MORBID (SEVERE) OBESITY DUE TO EXCESS CALORIES (HCC): Status: ACTIVE | Noted: 2023-05-20

## 2023-05-20 LAB
ALBUMIN SERPL-MCNC: 4 G/DL (ref 3.4–5)
ALBUMIN/GLOB SERPL: 1.1 {RATIO} (ref 1–2)
ALP LIVER SERPL-CCNC: 90 U/L
ALT SERPL-CCNC: 41 U/L
ANION GAP SERPL CALC-SCNC: 2 MMOL/L (ref 0–18)
AST SERPL-CCNC: 24 U/L (ref 15–37)
BASOPHILS # BLD AUTO: 0.08 X10(3) UL (ref 0–0.2)
BASOPHILS NFR BLD AUTO: 0.8 %
BILIRUB SERPL-MCNC: 0.3 MG/DL (ref 0.1–2)
BUN BLD-MCNC: 20 MG/DL (ref 7–18)
CALCIUM BLD-MCNC: 9.2 MG/DL (ref 8.5–10.1)
CHLORIDE SERPL-SCNC: 110 MMOL/L (ref 98–112)
CHOLEST SERPL-MCNC: 133 MG/DL (ref ?–200)
CO2 SERPL-SCNC: 27 MMOL/L (ref 21–32)
COMPLEXED PSA SERPL-MCNC: 0.16 NG/ML (ref ?–4)
CREAT BLD-MCNC: 1.03 MG/DL
EOSINOPHIL # BLD AUTO: 0.37 X10(3) UL (ref 0–0.7)
EOSINOPHIL NFR BLD AUTO: 3.8 %
ERYTHROCYTE [DISTWIDTH] IN BLOOD BY AUTOMATED COUNT: 13.2 %
EST. AVERAGE GLUCOSE BLD GHB EST-MCNC: 120 MG/DL (ref 68–126)
FASTING PATIENT LIPID ANSWER: NO
FASTING STATUS PATIENT QL REPORTED: NO
GFR SERPLBLD BASED ON 1.73 SQ M-ARVRAT: 90 ML/MIN/1.73M2 (ref 60–?)
GLOBULIN PLAS-MCNC: 3.5 G/DL (ref 2.8–4.4)
GLUCOSE BLD-MCNC: 109 MG/DL (ref 70–99)
HBA1C MFR BLD: 5.8 % (ref ?–5.7)
HCT VFR BLD AUTO: 49.3 %
HDLC SERPL-MCNC: 41 MG/DL (ref 40–59)
HGB BLD-MCNC: 16.7 G/DL
IMM GRANULOCYTES # BLD AUTO: 0.06 X10(3) UL (ref 0–1)
IMM GRANULOCYTES NFR BLD: 0.6 %
LDLC SERPL CALC-MCNC: 63 MG/DL (ref ?–100)
LYMPHOCYTES # BLD AUTO: 3.27 X10(3) UL (ref 1–4)
LYMPHOCYTES NFR BLD AUTO: 33.9 %
MCH RBC QN AUTO: 29.3 PG (ref 26–34)
MCHC RBC AUTO-ENTMCNC: 33.9 G/DL (ref 31–37)
MCV RBC AUTO: 86.6 FL
MONOCYTES # BLD AUTO: 1.02 X10(3) UL (ref 0.1–1)
MONOCYTES NFR BLD AUTO: 10.6 %
NEUTROPHILS # BLD AUTO: 4.84 X10 (3) UL (ref 1.5–7.7)
NEUTROPHILS # BLD AUTO: 4.84 X10(3) UL (ref 1.5–7.7)
NEUTROPHILS NFR BLD AUTO: 50.3 %
NONHDLC SERPL-MCNC: 92 MG/DL (ref ?–130)
OSMOLALITY SERPL CALC.SUM OF ELEC: 291 MOSM/KG (ref 275–295)
PLATELET # BLD AUTO: 226 10(3)UL (ref 150–450)
POTASSIUM SERPL-SCNC: 4.6 MMOL/L (ref 3.5–5.1)
PROT SERPL-MCNC: 7.5 G/DL (ref 6.4–8.2)
RBC # BLD AUTO: 5.69 X10(6)UL
SODIUM SERPL-SCNC: 139 MMOL/L (ref 136–145)
T4 FREE SERPL-MCNC: 1.1 NG/DL (ref 0.8–1.7)
TRIGL SERPL-MCNC: 174 MG/DL (ref 30–149)
TSI SER-ACNC: 1.1 MIU/ML (ref 0.36–3.74)
VIT B12 SERPL-MCNC: 488 PG/ML (ref 193–986)
VLDLC SERPL CALC-MCNC: 26 MG/DL (ref 0–30)
WBC # BLD AUTO: 9.6 X10(3) UL (ref 4–11)

## 2023-05-20 PROCEDURE — 36415 COLL VENOUS BLD VENIPUNCTURE: CPT | Performed by: FAMILY MEDICINE

## 2023-05-20 PROCEDURE — 83036 HEMOGLOBIN GLYCOSYLATED A1C: CPT | Performed by: FAMILY MEDICINE

## 2023-05-20 PROCEDURE — 85025 COMPLETE CBC W/AUTO DIFF WBC: CPT | Performed by: FAMILY MEDICINE

## 2023-05-20 PROCEDURE — 80061 LIPID PANEL: CPT | Performed by: FAMILY MEDICINE

## 2023-05-20 PROCEDURE — 3078F DIAST BP <80 MM HG: CPT | Performed by: FAMILY MEDICINE

## 2023-05-20 PROCEDURE — 90471 IMMUNIZATION ADMIN: CPT | Performed by: FAMILY MEDICINE

## 2023-05-20 PROCEDURE — 99396 PREV VISIT EST AGE 40-64: CPT | Performed by: FAMILY MEDICINE

## 2023-05-20 PROCEDURE — 3008F BODY MASS INDEX DOCD: CPT | Performed by: FAMILY MEDICINE

## 2023-05-20 PROCEDURE — 90677 PCV20 VACCINE IM: CPT | Performed by: FAMILY MEDICINE

## 2023-05-20 PROCEDURE — 84439 ASSAY OF FREE THYROXINE: CPT | Performed by: FAMILY MEDICINE

## 2023-05-20 PROCEDURE — 84443 ASSAY THYROID STIM HORMONE: CPT | Performed by: FAMILY MEDICINE

## 2023-05-20 PROCEDURE — 3075F SYST BP GE 130 - 139MM HG: CPT | Performed by: FAMILY MEDICINE

## 2023-05-20 PROCEDURE — 82607 VITAMIN B-12: CPT | Performed by: FAMILY MEDICINE

## 2023-05-20 PROCEDURE — 80053 COMPREHEN METABOLIC PANEL: CPT | Performed by: FAMILY MEDICINE

## 2023-05-20 RX ORDER — ALBUTEROL SULFATE 90 UG/1
2 AEROSOL, METERED RESPIRATORY (INHALATION) EVERY 4 HOURS PRN
Qty: 18 G | Refills: 2 | Status: SHIPPED | OUTPATIENT
Start: 2023-05-20

## 2023-05-20 RX ORDER — ROSUVASTATIN CALCIUM 10 MG/1
10 TABLET, COATED ORAL NIGHTLY
Qty: 90 TABLET | Refills: 3 | Status: SHIPPED | OUTPATIENT
Start: 2023-05-20

## 2023-05-20 RX ORDER — LEVOTHYROXINE SODIUM 0.2 MG/1
200 TABLET ORAL
Qty: 90 TABLET | Refills: 3 | Status: SHIPPED | OUTPATIENT
Start: 2023-05-20

## 2023-05-20 RX ORDER — MONTELUKAST SODIUM 10 MG/1
TABLET ORAL
Qty: 90 TABLET | Refills: 3 | Status: SHIPPED | OUTPATIENT
Start: 2023-05-20

## 2023-05-20 RX ORDER — FLUTICASONE FUROATE AND VILANTEROL 200; 25 UG/1; UG/1
1 POWDER RESPIRATORY (INHALATION) DAILY
Qty: 84 EACH | Refills: 3 | Status: SHIPPED | OUTPATIENT
Start: 2023-05-20

## 2023-05-20 NOTE — PROGRESS NOTES
Patient came in for draw of ordered fasting labs. Patient drawn out of right arm  AC, x 1 attempt and tolerated well.  1 SST ( green) 1 Lavender  tube drawn.

## 2023-05-20 NOTE — PATIENT INSTRUCTIONS
Perform labs fasting 8 hours with water or black coffee or or black tea diet  soda only prior to exam.    -Encourage healthy diet of whole food and avoid processed food and sugary drinks and sodas. Diet should include lean meats and vegetables including 5-7 servings of fruit and vegetables total in 1 day. Never skip breakfast.  -Encouraged exercise 30 minutes or more 5 times weekly to prevent obesity and chronic disease and eliminate stress and its effect on the body. Total 150mins weekly or more. -encouraged to continue not smoking  - recommend condom use per CDC recommendation for all  or unmarried couples  -  immunizations- annual influenza vaccine recommended  -Vitamin D3 1000- 2000 units daily recommended  -Needs 1000mg of calcium daily for osteoporosis prevention discussed. Need to ingest 1000mg of calcium daily to prevent osteoporosis later in life. I.e. one 8 ounce glass of silk Pep milk has 450 mg of calcium and label states 45%. Labels list calcium percentages not milligrams. To calculate milligrams per serving remove the percentage and add a zero (0).  I.e.  9% calcium equals 90 mg

## 2023-05-21 DIAGNOSIS — R73.03 PREDIABETES: Primary | ICD-10-CM

## 2023-05-25 ENCOUNTER — TELEPHONE (OUTPATIENT)
Dept: FAMILY MEDICINE CLINIC | Facility: CLINIC | Age: 48
End: 2023-05-25

## 2023-05-25 NOTE — TELEPHONE ENCOUNTER
LMOM to return call to the office. Provided pt office phone [phone] along with office hours. Pt will need a nurse visit for Tdap Vaccine.

## 2023-09-07 ENCOUNTER — APPOINTMENT (OUTPATIENT)
Dept: GENERAL RADIOLOGY | Age: 48
End: 2023-09-07
Attending: NURSE PRACTITIONER
Payer: COMMERCIAL

## 2023-09-07 ENCOUNTER — HOSPITAL ENCOUNTER (OUTPATIENT)
Age: 48
Discharge: HOME | End: 2023-09-07
Payer: COMMERCIAL

## 2023-09-07 VITALS
WEIGHT: 288 LBS | OXYGEN SATURATION: 96 % | DIASTOLIC BLOOD PRESSURE: 87 MMHG | TEMPERATURE: 99 F | BODY MASS INDEX: 38.17 KG/M2 | SYSTOLIC BLOOD PRESSURE: 162 MMHG | HEART RATE: 73 BPM | HEIGHT: 73 IN | RESPIRATION RATE: 18 BRPM

## 2023-09-07 DIAGNOSIS — S90.32XA CONTUSION OF LEFT FOOT, INITIAL ENCOUNTER: ICD-10-CM

## 2023-09-07 DIAGNOSIS — S99.929A FOOT INJURY: Primary | ICD-10-CM

## 2023-09-07 PROCEDURE — 73630 X-RAY EXAM OF FOOT: CPT | Performed by: NURSE PRACTITIONER

## 2023-09-07 RX ORDER — METHYLPREDNISOLONE 4 MG/1
TABLET ORAL
Qty: 21 TABLET | Refills: 0 | Status: SHIPPED | OUTPATIENT
Start: 2023-09-07

## 2023-09-07 NOTE — ED INITIAL ASSESSMENT (HPI)
Pt presents to the IC with c/o bruising, swelling, and pain to the toes and foot after falling off his chair and hitting his foot on the under part of this desk yesterday.

## 2023-09-20 ENCOUNTER — TELEPHONE (OUTPATIENT)
Dept: FAMILY MEDICINE CLINIC | Facility: CLINIC | Age: 48
End: 2023-09-20

## 2023-09-20 DIAGNOSIS — L98.9 SKIN LESION: Primary | ICD-10-CM

## 2023-09-20 NOTE — TELEPHONE ENCOUNTER
Pt called the office asking for a Dermatology referral patient wants a referred provider within [facility].

## 2023-10-23 ENCOUNTER — TELEPHONE (OUTPATIENT)
Dept: FAMILY MEDICINE CLINIC | Facility: CLINIC | Age: 48
End: 2023-10-23

## 2023-10-23 DIAGNOSIS — G47.33 OSA ON CPAP: Primary | ICD-10-CM

## 2023-10-23 NOTE — TELEPHONE ENCOUNTER
Received a fax from [address] [place] [address]  requesting authorization from PCP for CPAP supplies. Address: [address] [place] [address]                  Marlene, [address] [place] [address] Avenue  Phone:[phone]  Fax: [phone]  Tax ID: [id number]  NPI: [id number]     Referral pended for provider review and signature. To nursing: once referral with CPAP supplies is authorized, it will need to be faxed to [phone] with authorization number.

## 2023-10-25 NOTE — TELEPHONE ENCOUNTER
Patient calling he needs his Cpap supplies  wants to know why it is taking so long and for someone to call him back

## 2023-10-25 NOTE — TELEPHONE ENCOUNTER
Referral faxed to [phone]. Confirmation received. Called pt and informed of referral being faxed. Pt v/u.

## 2023-11-25 DIAGNOSIS — N52.9 ERECTILE DYSFUNCTION, UNSPECIFIED ERECTILE DYSFUNCTION TYPE: ICD-10-CM

## 2023-12-01 DIAGNOSIS — N52.9 ERECTILE DYSFUNCTION, UNSPECIFIED ERECTILE DYSFUNCTION TYPE: ICD-10-CM

## 2023-12-06 RX ORDER — TADALAFIL 20 MG/1
20 TABLET ORAL
Qty: 30 TABLET | Refills: 5 | OUTPATIENT
Start: 2023-12-06

## 2023-12-11 RX ORDER — TADALAFIL 20 MG/1
20 TABLET ORAL
Qty: 30 TABLET | Refills: 5 | OUTPATIENT
Start: 2023-12-11

## 2024-01-17 ENCOUNTER — TELEPHONE (OUTPATIENT)
Dept: FAMILY MEDICINE CLINIC | Facility: CLINIC | Age: 49
End: 2024-01-17

## 2024-01-17 DIAGNOSIS — R53.83 OTHER FATIGUE: ICD-10-CM

## 2024-01-17 DIAGNOSIS — G47.33 OSA ON CPAP: Primary | ICD-10-CM

## 2024-01-17 NOTE — TELEPHONE ENCOUNTER
Spouse called, states pt needs referral to see Dr. Carias for yearly check up.    Referral pended for provider review and signature.

## 2024-05-08 NOTE — PROGRESS NOTES
HPI:     Jose Alonso is a 48 year old male with a PMH of obesity, HL, hypothyroid, YO (CPAP).  Following for:  1. Hypogonadism  - 100 mg q w 2/14/22  2. H/o kidney stones  - passed two, no procedures  3. ED  - 20 mg cialis prn    PCP - Remigio GONZALEZ 5/15/23    He stopped T in December 2022, stopped smoking.  Sleeps ~ 6-7 h per night and snores. Using CPAP faithfully.  Energy is a little low.  Does a lot of heavy lifting at work and trying to go back to the gym.    AUA SS is 5/35 with 1/5 n, s, w, , I, YANETH. Happy with LUTS and declines meds.  Incontinence: none  Penoscrotal exam prior visit: no abnormalities.    T labs  - 8/27/22: CBC, E WNL  - 5/9/22: 372.91  - 4/16/22: RBC 5.78, other parameters WNL  - 2/7/22: T 159.1   - 11/13/15: 198     PSA 0.16 5/20/23    ~ 0 % potency without meds and 80% potency with 20 mg cialis prn. Discussed off-label option to try up to 30 mg cialis prn and reviewed SEs. He may try this.    CT SP 10/26/21: 4 mm left UVJ stone. No other stones  CT SP 8/12/18: 3.5 mm left UVJ stone    Reported ~ 20-30 oz water, 20-30 oz coffee, 12 soda with medium urine. Now 100 water, 20 tea, 40 coffee with variable urine. Recommend he drink at least 40-60 oz water per day to keep urine clear for stone prevention.    Discussed option to check CT to ensure no new stones and he declines as he is drinking a lot more water than he has in the past.    He is happy off TRT. Observation for BPH and continue 20 mg cialis prn ED and may increase to 30 mg as off-label. Continue increased water intake for stone prevention. Screening PSA ordered which he will do with upcoming labs. He would like to continue annual checks with me.    Prior note:  He is not interested in preserving fertility.    Energy is low. Libido is very low. Has been going on for 5-6 mo.    Gross hematuria: none  Tobacco hx: 20 pack years, current 1/2 PPD  Fam hx  malignancy: none    We discussed options for testosterone replacement therapy,  including T injections and androgel. We discussed the risks and benefits to TRT. We will check a T, CBC today and if low he would like to try T injections.    We also discussed natural ways to boost testosterone, such as healthy diet, regular exercise (at least 30 minutes, 3 times per week, particularly resistance training), 7-10 hours sleep per night and going to bed and waking up around the same time every day, regular ejaculation, minimizing stress, avoiding excessive alcohol or illicit drug use.    We discussed stone prevention strategies at today's visit and I provided and reviewed educational materials for this. I recommend drinking at least 40-60 ounces of water per day or enough water to keep urine clear. I also recommend the patient avoid a high sodium diet. I also recommend avoiding foods high in oxalate and provided a list of foods high in oxalate.     He will double water intake, T labs today and start 100 mg T q week, observation for ED for now. F/u in 3 mo with labs prior.    HISTORY:  Past Medical History:    Concussion    LOC for 1 hour    Current every day smoker    GERD (gastroesophageal reflux disease)    EGD 2014 +Duodenitis, by Dr. Herman     Hyperlipidemia    Hypogonadism male    low testosterone. MRI pituitary normal. symptomatic    Hypothyroidism    Kidney stone    Moderate persistent asthma (HCC)    Obesity, Class II, BMI 35-39.9, with comorbidity    YO on CPAP    Pneumonia    Tongue biting    stitches      Past Surgical History:   Procedure Laterality Date    Myringotomy, laser-assisted Bilateral infant , age 8      Family History   Problem Relation Age of Onset    High Cholesterol Father     Heart Disease Father     Heart Surgery Father 58        Aortic aneursym, valve replacement    Diabetes Mother     Thyroid Disorder Mother     Melanoma Maternal Grandmother     Heart Disease Maternal Grandfather     Other (Aneurysm) Paternal Aunt         Cause of death    Asthma Sister     No Known  Problems Brother     No Known Problems Daughter     No Known Problems Brother     No Known Problems Brother     No Known Problems Brother       Social History:   Social History     Socioeconomic History    Marital status:      Spouse name: Susan    Number of children: 1   Occupational History    Occupation: [employer]     Comment: heavy lifting, manual labor   Tobacco Use    Smoking status: Former     Current packs/day: 0.00     Types: Cigarettes     Quit date: 2001     Years since quittin.3    Smokeless tobacco: Never    Tobacco comments:     Quit again   Vaping Use    Vaping status: Never Used   Substance and Sexual Activity    Alcohol use: Yes     Alcohol/week: 0.0 standard drinks of alcohol     Comment: Rare    Drug use: No    Sexual activity: Yes     Partners: Female   Other Topics Concern    Caffeine Concern No    Occupational Exposure Yes     Comment: High exposure to dust and particulate at work. Doesn't use mask.    Stress Concern Yes    Weight Concern Yes    Exercise No    Reaction to local anesthetic No        Medications (Active prior to today's visit):  Current Outpatient Medications   Medication Sig Dispense Refill    Tadalafil (CIALIS) 20 MG Oral Tab Take 1 tablet (20 mg total) by mouth daily as needed for Erectile Dysfunction. 30 tablet 5    methylPREDNISolone (MEDROL) 4 MG Oral Tablet Therapy Pack Dosepack: take as directed 21 tablet 0    rosuvastatin 10 MG Oral Tab Take 1 tablet (10 mg total) by mouth nightly. 90 tablet 3    fluticasone furoate-vilanterol (BREO ELLIPTA) 200-25 MCG/ACT Inhalation Aerosol Powder, Breath Activated Inhale 1 puff into the lungs daily. 84 each 3    montelukast 10 MG Oral Tab TAKE 1 TABLET BY MOUTH EVERY DAY AT NIGHT 90 tablet 3    albuterol 108 (90 Base) MCG/ACT Inhalation Aero Soln Inhale 2 puffs into the lungs every 4 (four) hours as needed for Wheezing or Shortness of Breath. 18 g 2    levothyroxine 200 MCG Oral Tab Take 1 tablet (200 mcg total) by  mouth before breakfast. 90 tablet 3    Nebulizer Does not apply Misc Use every 4 hours with albuterol as needed. Disp #1  DX J45.40 1 each 0       Allergies:  Allergies   Allergen Reactions    Dust     Omeprazole OTHER (SEE COMMENTS)     Wife states pt had pancreatitis    Pollen          ROS:     A comprehensive 10 point review of systems was completed.  Pertinent positives and negatives noted in the the HPI.    PHYSICAL EXAM:     GENERAL APPEARANCE: well, developed, well nourished, in no acute distress  NEUROLOGIC: nonfocal, alert and oriented  HEAD: normocephalic, atraumatic  EYES: sclera non-icteric  EARS: hearing intact  ORAL CAVITY: mucosa moist  NECK/THYROID: no obvious goiter or masses  LUNGS: nonlabored breathing  ABDOMEN: soft, no obvious masses or tenderness  SKIN: no obvious rashes    : as noted above     ASSESSMENT/PLAN:   Diagnoses and all orders for this visit:    Hypogonadism in male  -     URINALYSIS, AUTO, W/O SCOPE    Erectile dysfunction, unspecified erectile dysfunction type  -     Tadalafil (CIALIS) 20 MG Oral Tab; Take 1 tablet (20 mg total) by mouth daily as needed for Erectile Dysfunction.    Screening PSA (prostate specific antigen)  -     PSA Total, Screen; Future    Recurrent kidney stones    - as noted above.    Thanks again for this consult.    Zhang Call MD, FACS  Urologist  [facility]  Office: [phone]

## 2024-05-15 ENCOUNTER — OFFICE VISIT (OUTPATIENT)
Dept: SURGERY | Facility: CLINIC | Age: 49
End: 2024-05-15

## 2024-05-15 DIAGNOSIS — Z12.5 SCREENING PSA (PROSTATE SPECIFIC ANTIGEN): ICD-10-CM

## 2024-05-15 DIAGNOSIS — N52.9 ERECTILE DYSFUNCTION, UNSPECIFIED ERECTILE DYSFUNCTION TYPE: ICD-10-CM

## 2024-05-15 DIAGNOSIS — E29.1 HYPOGONADISM IN MALE: Primary | ICD-10-CM

## 2024-05-15 DIAGNOSIS — N20.0 RECURRENT KIDNEY STONES: ICD-10-CM

## 2024-05-15 LAB
APPEARANCE: CLEAR
BILIRUBIN: NEGATIVE
GLUCOSE (URINE DIPSTICK): NEGATIVE MG/DL
KETONES (URINE DIPSTICK): NEGATIVE MG/DL
LEUKOCYTES: NEGATIVE
MULTISTIX LOT#: ABNORMAL NUMERIC
NITRITE, URINE: NEGATIVE
PH, URINE: 6.5 (ref 4.5–8)
PROTEIN (URINE DIPSTICK): NEGATIVE MG/DL
SPECIFIC GRAVITY: 1.02 (ref 1–1.03)
URINE-COLOR: YELLOW
UROBILINOGEN,SEMI-QN: 0.2 MG/DL (ref 0–1.9)

## 2024-05-15 PROCEDURE — 81003 URINALYSIS AUTO W/O SCOPE: CPT | Performed by: UROLOGY

## 2024-05-15 PROCEDURE — 99214 OFFICE O/P EST MOD 30 MIN: CPT | Performed by: UROLOGY

## 2024-05-15 RX ORDER — TADALAFIL 20 MG/1
20 TABLET ORAL
Qty: 30 TABLET | Refills: 5 | Status: SHIPPED | OUTPATIENT
Start: 2024-05-15

## 2024-05-30 ENCOUNTER — TELEPHONE (OUTPATIENT)
Dept: SURGERY | Facility: CLINIC | Age: 49
End: 2024-05-30

## 2024-05-30 DIAGNOSIS — E03.9 HYPOTHYROIDISM IN ADULT: ICD-10-CM

## 2024-05-30 DIAGNOSIS — J45.40 MODERATE PERSISTENT ASTHMA WITHOUT COMPLICATION (HCC): ICD-10-CM

## 2024-05-30 DIAGNOSIS — J30.89 ENVIRONMENTAL AND SEASONAL ALLERGIES: ICD-10-CM

## 2024-05-30 RX ORDER — LEVOTHYROXINE SODIUM 0.2 MG/1
200 TABLET ORAL
Qty: 90 TABLET | Refills: 3 | OUTPATIENT
Start: 2024-05-30

## 2024-05-30 RX ORDER — MONTELUKAST SODIUM 10 MG/1
TABLET ORAL
Qty: 90 TABLET | Refills: 3 | OUTPATIENT
Start: 2024-05-30

## 2024-05-30 NOTE — TELEPHONE ENCOUNTER
Refill Failed Protocol:     Pt requesting refill of   Requested Prescriptions     Pending Prescriptions Disp Refills    LEVOTHYROXINE 200 MCG Oral Tab [Pharmacy Med Name: LEVOTHYROXINE 200 MCG TABLET] 90 tablet 3     Sig: TAKE 1 TABLET (200 MCG TOTAL) BY MOUTH BEFORE BREAKFAST      Last Time Medication was Filled:  5/20/2023    Last Office Visit with Provider: 5/20/2023    Unable to approve refill,     Thyroid Medication Protocol [id number]/30/2024 12:15 AM   Protocol Details TSH in past 12 months    In person appointment or virtual visit in the past 12 mos or appointment in next 3 mos    Last TSH value is normal        Free T4 (ng/dL)   Date Value   05/20/2023 1.1     FREE T4 (ng/dL)   Date Value   04/27/2013 1.1     TSH   Date Value   05/20/2023 1.100 mIU/mL   11/13/2015 7.35 mIU/L (H)      No future appointments.

## 2024-05-30 NOTE — TELEPHONE ENCOUNTER
Patient wife calling to request if possible for medication refill for about a month until patient is able to see their new PCP. Patient will be establishing care with a new primary but are soon to be out of their thyroid medication and wife would like to know if possible for Dr. Call to refill for time being. Please advise.          Disp Refills Start End    levothyroxine 200 MCG Oral Tab 90 tablet 3 5/20/2023 --    Sig - Route: Take 1 tablet (200 mcg total) by mouth before breakfast. - Oral    Sent to pharmacy as: Levothyroxine Sodium 200 MCG Oral Tablet (Synthroid)    E-Prescribing Status: Receipt confirmed by pharmacy (5/20/2023  9:55 AM CDT)      Print Trail   Printed On Printed By Printed To Report   5/20/23 9:55 AM Mirtha Flood DO [facility] Generic Report

## 2024-05-30 NOTE — TELEPHONE ENCOUNTER
Refill Failed Protocol:     Pt requesting refill of   Requested Prescriptions     Pending Prescriptions Disp Refills    montelukast 10 MG Oral Tab 90 tablet 3     Sig: TAKE 1 TABLET BY MOUTH EVERY DAY AT NIGHT      Last Time Medication was Filled:  5/20/2023    Last Office Visit with Provider: 5/20/2023    Unable to approve refill,   Asthma & COPD Medication Protocol [id number]/30/2024 10:45 AM   Protocol Details Asthma Action Score greater than or equal to 20    Appointment in past 6 or next 3 months    AAP/ACT given in last 12 months          No future appointments.

## 2024-05-31 DIAGNOSIS — E03.9 HYPOTHYROIDISM IN ADULT: ICD-10-CM

## 2024-05-31 NOTE — TELEPHONE ENCOUNTER
Refill Failed Protocol:     Pt requesting refill of   Requested Prescriptions     Pending Prescriptions Disp Refills    levothyroxine 200 MCG Oral Tab 30 tablet 0     Sig: Take 1 tablet (200 mcg total) by mouth before breakfast.      Last Time Medication was Filled:  5/20/2023    Last Office Visit with Provider: 5/20/2023    Unable to approve refill,     Thyroid Medication Protocol [id number]/31/2024 03:39 PM   Protocol Details TSH in past 12 months    In person appointment or virtual visit in the past 12 mos or appointment in next 3 mos    Last TSH value is normal          Appt. scheduled on 6/27/2024      Patient wife called stating they will be going out of town this Sunday. Pt has upcoming appt scheduled. Pt wife asking if they can have medication supply until appt date.

## 2024-06-01 NOTE — TELEPHONE ENCOUNTER
Pt last seen by PCP in 5/2023 for annual px. He is overdue for thyroid f-u.     However, he has an upcoming appt with different PCP, Dr. Arreola on 6/13/24.    Before replying to med refill, please confirm with pt if he is still a pt of Dr. Flood, or establishing with a new PCP.    Thanks.

## 2024-06-05 NOTE — TELEPHONE ENCOUNTER
LMOM to return call to the office. Provided pt office phone [phone] along with office hours.

## 2024-06-13 RX ORDER — LEVOTHYROXINE SODIUM 0.2 MG/1
200 TABLET ORAL
Qty: 30 TABLET | Refills: 0 | OUTPATIENT
Start: 2024-06-13

## 2024-06-25 ENCOUNTER — OFFICE VISIT (OUTPATIENT)
Dept: FAMILY MEDICINE CLINIC | Facility: CLINIC | Age: 49
End: 2024-06-25

## 2024-06-25 VITALS
OXYGEN SATURATION: 95 % | TEMPERATURE: 98 F | SYSTOLIC BLOOD PRESSURE: 122 MMHG | HEART RATE: 77 BPM | WEIGHT: 295 LBS | BODY MASS INDEX: 39.1 KG/M2 | DIASTOLIC BLOOD PRESSURE: 82 MMHG | RESPIRATION RATE: 16 BRPM | HEIGHT: 73 IN

## 2024-06-25 DIAGNOSIS — J45.40 MODERATE PERSISTENT ASTHMA WITHOUT COMPLICATION (HCC): ICD-10-CM

## 2024-06-25 DIAGNOSIS — R79.89 LOW TESTOSTERONE IN MALE: ICD-10-CM

## 2024-06-25 DIAGNOSIS — Z82.49 FAMILY HISTORY OF THORACIC AORTIC ANEURYSM: ICD-10-CM

## 2024-06-25 DIAGNOSIS — F17.200 TOBACCO DEPENDENCE: ICD-10-CM

## 2024-06-25 DIAGNOSIS — Z76.89 ESTABLISHING CARE WITH NEW DOCTOR, ENCOUNTER FOR: Primary | ICD-10-CM

## 2024-06-25 DIAGNOSIS — E03.9 HYPOTHYROIDISM IN ADULT: ICD-10-CM

## 2024-06-25 DIAGNOSIS — M25.562 ACUTE PAIN OF LEFT KNEE: ICD-10-CM

## 2024-06-25 DIAGNOSIS — E66.01 CLASS 2 SEVERE OBESITY DUE TO EXCESS CALORIES WITH SERIOUS COMORBIDITY AND BODY MASS INDEX (BMI) OF 36.0 TO 36.9 IN ADULT (HCC): ICD-10-CM

## 2024-06-25 DIAGNOSIS — E78.2 MIXED HYPERLIPIDEMIA: ICD-10-CM

## 2024-06-25 DIAGNOSIS — Z00.00 LABORATORY EXAM ORDERED AS PART OF ROUTINE GENERAL MEDICAL EXAMINATION: ICD-10-CM

## 2024-06-25 PROBLEM — R73.03 PREDIABETES: Status: RESOLVED | Noted: 2023-05-21 | Resolved: 2024-06-25

## 2024-06-25 PROCEDURE — 3074F SYST BP LT 130 MM HG: CPT | Performed by: FAMILY MEDICINE

## 2024-06-25 PROCEDURE — 99215 OFFICE O/P EST HI 40 MIN: CPT | Performed by: FAMILY MEDICINE

## 2024-06-25 PROCEDURE — 3008F BODY MASS INDEX DOCD: CPT | Performed by: FAMILY MEDICINE

## 2024-06-25 PROCEDURE — 3079F DIAST BP 80-89 MM HG: CPT | Performed by: FAMILY MEDICINE

## 2024-06-25 RX ORDER — TESTOSTERONE GEL, 1% 10 MG/G
50 GEL TRANSDERMAL DAILY
Qty: 30 EACH | Refills: 2 | Status: SHIPPED | OUTPATIENT
Start: 2024-06-25 | End: 2024-07-25

## 2024-06-25 NOTE — PATIENT INSTRUCTIONS
Cardiac  CT  Echo   Lung CT when 50yo   Abd Aorta US at 50yo   Labs   Nutrition     Weight Loss Tips  Cut down on sugar and starches.  Ok to eat healthy fat ( avocado, nuts,eggs, olive oil and coconut oil)  Eat protein with each meal target 15-30 G of protein with each meal: Protein reduces appetite, cravings and hunger. It increases muscle mass and subsequently metabolism and fat burning.  Limit carbohydrates <100g / day  Limit processed food, eat unprocessed food whenever you can.  Read nutrition labels  Drink >64 oz of water per day: Water is a natural appetite suppressant, increases calorie burning and help you to loose weight.  Do not drink your calories ( avoid soft drinks, juice, high calorie coffee drinks, limit alcohol) Also stay away from artificially sweetened beverages too it can cause weight gain.  Attempt Intermittent Fasting--- Fast for 16 hrs and Feast for 8hrs  Do not eat late at night.   Exercise goal for weight loss is 150 minutes per week. (There is benefit from doing TWO 1hr and 15m workouts meets the 150m goal)  Use smart phone applications to track your progress/intake e.g:(Myfitnesspal )  Reduce your stress level; Bedtime meditation 5-10 minutes daily.   Helpful exercise apps( iFIT)  Helpful fasting apps :( Zero)   Foods to avoid :  Sugar: sweets, ice cream, fruit juices, candy and food that is high in added sugar.  Highly processed food  Refined grains: Rice, wheat, bread, cereal and pasta.  Starchy vegetables: Potatoes and corn      Low Carb food :  Meat: lamb, steak, chicken, turkey  Fish and sea food   Eggs  Plain yogurt   Cheese   Fat and oils   Fruits: berries are the best        How I Plan to Lose Weight  Goal setting is the “how” of weight loss. Motivators are the “why.” When setting goals, utilize the SMART technique:  SMART Technique Example   Specific Who, what, where, when, how… “I want to lose 10 pounds in two months.”   Measureable How will you track? 10 pounds in 8 weeks = 1.25  pounds/week   Attainable Resources you have available, previous experience “I have been able to do this before, and now I have new tools from my doctor!”   Relevant Why this goal is important Review your motivators above   Timely Set benchmarks and deadlines “Focusing for two month intervals works for me.”      Even if your lose 0.5 pound per week You will still lose 26                       pounds by this time next year!         Strength Training offers multiple health benefits:  Increased muscle mass: Muscle mass naturally decreases with age, but strength training can help reverse the trend.  Stronger bones: Strength training increases bone density and reduces the risk of fractures.  Joint flexibility: Strength training helps joints stay flexible and can reduce the symptoms of arthritis.  Weight control: As you gain muscle, your body begins to burn calories more easily, making it easier to control your weight and helping speed-up your metabolism   Balance: Strengthening exercises can increase flexibility and balance as people age, reducing falls and injuries.  [employer] is an Interactive Fitness Workout Ev affiliated with [employer].  They have some good guided fitness classes for strength training,  HIIT, yoga, etc.  (iFIT.COM/EV).    They do offer a free trial to test out the program.  (I don't have any type of affiliation, but have found some of these work-outs to be helpful) Trainer - Adrien Shay has some good Basic and Advanced Weight Training Series.       [employer]- Intermittent Fasting Blueprint (https://www.Altruja.com)    1. Pick the protocol that works with your lifestyle (explained further below):  16/8 Protocol: 16 hours of fasting, 8 hours of feasting every day.  24-hour fast Protocol: 24 hours of fasting 1x-2x per week, eat normally otherwise.  Give your body time to adjust! You might not really be hungry, you’ve just trained your body to expect food every 2 hours over many years. This could  take many days. Treat this like a 1-month experiment. See how your body responds, and adjust along the way  Drinking water, black coffee, or black/green tea during your fasted period is acceptable, but no liquid calories! Avoid all calories during fasting, and confine all eating to the “feasting”  window. If cream/milk/sugar/diet soda makes life worth living to you, add it to your coffee. 95% compliance is better, especially when building the habit and transitioning to IF, than giving up the things you love and then abandoning the practice before the habit begins.  Exercise during a fast for a turbo boost of fat loss. Fasted morning walks and strength training during the fasted window can help reduce body fat. If you get lightheaded, drink plenty of water. Make sure you are consuming enough calories during your ‘feasting’ window to fuel your lifestyle.   Consume enough calories for your body type and goals. During your feasting window, eat healthy sized meals that leave you full, and track your progress. Gaining weight? Reduce calories by 10%. Losing too much weight? Increase calories by 10%. Test, compare, learn, change, fix, test!  Don’t overthink it. Traveling? Make that a fast day and avoid unhealthy airport food. Have a birthday brunch to attend? Eat breakfast that day and skip dinner instead. This is not all-or-nothing.  Skip a meal if no healthy options are present: boom! You’re intermittent fasting.  Start with 16/8 or 24 hour fasts, but adjust it to your schedule. Some people like 20/4, or 18/6. Or they do 24 hour fasts 2x per week instead of once. Make it work for your life.  Combine this with other strategies for maximum effectiveness. To become an actual superhero, Intermittent Fasting can and should be combined with exercise, a nutritious diet composed of vegetables, protein, and healthy fats, and plenty of sleep. Read [employer]itness.com for other tips and tricks to level up your life, every single day

## 2024-06-25 NOTE — PROGRESS NOTES
Family Medicine Progress Note  Assessment & Plan:   Jose Alonso is a 48 year old male who is here for:     1. Establishing care with new doctor, encounter for  discussed PMH, PSH, Meds, Soc HX.   Last Annual: ? Due     2. Low testosterone in male-  history of Low T, was on replacement, would like to restart.    - Labs ordered.   - Pt would prefer topical >> Injection.   - Aware of safety and need for monitoring labs.    - Testosterone, Total Free, Male [E]; Future  - Testosterone 50 MG/5GM (1%) Transdermal Gel; Place 50 mg onto the skin daily.  Dispense: 30 each; Refill: 2    3. Tobacco dependence- - NOT  Ready to quit today  - Discussed benefits of quitting, as well as resources and quit aids     4. Moderate persistent asthma without complication (HCC)-  Chronic, Stable, Contin Breo and Singulair.      5. Hypothyroidism in adult- need to recheck TSH. Currnetly On Levo 200mcg. Does have low energy and difficulty with weight loss.     6. Class 2 severe obesity due to excess calories with serious comorbidity and body mass index (BMI) of 36.0 to 36.9 in adult (HCC)- - Discussed BMI   - Discussion about Intermittent Fasting--- how to implement into life and the pathophys   - Eat >4-5 servings of fruits/vegetables per day.   - Lean protein and Dairy is best (1% or skim)  - Maximize Water intake   - Recommend >150m of vigorous activity.  Can be spaced 5d/wk or weekend warrior-still will get benefit.   - Minimize eating fast food  - Did discuss medication assisted weight loss--   - Refer to Nutritionist/Dietician - EDW    7. Family history of thoracic aortic aneurysm-  [facility] TAA with Rupture.    - Disucssed benefit for Coronary Ca score.   - Also TTE ordered.   - CARD ECHO 2D DOPPLER (CPT=93306); Future  - CT CALCIUM SCORING; Future    8. Mixed hyperlipidemia-  due for repeat lipids; on Rosuvastatin.      9. Laboratory exam ordered as part of routine general medical examination  - CBC With Differential With Platelet;  Future  - Comp Metabolic Panel (14); Future  - TSH W Reflex To Free T4; Future  - Lipid Panel; Future    10. Acute pain of left knee- pt with mild pain of the left knee. Does appear more consistent with strain given it occurred with prolonged extension and has had some gradual improvement;  likely has some underlying OA.  Disucssed importance of strengthening surrounding knee stabilitizer.      Follow-Up: Return in about 5 weeks (around 7/30/2024) for Annual.      Joselin Arreola,    06/25/24      CC: Establish Care, Weight Management, Family History Of AAA (Would like to do testing. ), and Musculoskeletal Problem (Lt knee pain for a month. )    Subjective:    History of Present Illness:  History obtained from patient.     Jose Alonso is a 48 year old male who presents for Establish Care, Weight Management, Family History Of AAA (Would like to do testing. ), and Musculoskeletal Problem (Lt knee pain for a month. )     Pt is here to est care.   Annual - Due   Labs- 5/2023    Left Knee Pain- was sitting down putting together a play kitchen for grandchildren.  After getting up th knee was uncomfrtable.  +/- pain;  only sometimes present; no giving way.  No mechanical symptoms.     WEIGHT LOSS- pt is motivated for weight loss-  Goal 260lbs.  Diet- \"okay\", wife is [place]  and tends to have carb heavy meals.    F/V. Lean Protein.   Fast Food- Not much   Sweetened Ailyn-- Gatorade, rare Soda.   Fasts through some of the day due to work scheduled but will tend to snack on Beef Jerky, Chip, Oreos.   Exercise--- 70% of the time on his feet; Walking at work; Prev was going tot he gym and planning tpo get back into lifting weight   Meds- None in the past;   Nutrition- would like referral     [facility]- TAA- would like to have screening completed.   Tobacco- 1/2 ppd ffor 33ys - interested in Lung Ca screening at 49     Mod persistent Asthma- Breo BID, DELANEY,   HLD-Rosuvastation 10  Hypothyroidism-Levo 200mcg   Low T, ED-  testosterone 100mg q2 in the past.   Recurrent Kidney Stones- Dr. Call   YO-CPAP  Tobacco --- has tried to quit multiple times.  Generally seems to relapse-- 1/2 ppd. Since   Pshx: Myringotomy-  All: Dust, omeprazole, pollen   Fam hx:  AAA- F, PA;   Soc hx: , works in dock/door installation.    Colonoscopy: 11/02/2022     History/Other:   ROS-Per HPI     Problem List:  Patient Active Problem List   Diagnosis    YO on CPAP    Moderate persistent asthma (HCC)    Hypothyroidism in adult    Environmental and seasonal allergies    Mixed hyperlipidemia    Nephrolithiasis    Hydronephrosis with ureteropelvic junction (UPJ) obstruction    Chronic right shoulder pain    BMI 39.0-39.9,adult    Chronic left shoulder pain    Libido, decreased    Low testosterone in male       Current Medications:  Current Outpatient Medications   Medication Sig Dispense Refill    Tadalafil (CIALIS) 20 MG Oral Tab Take 1 tablet (20 mg total) by mouth daily as needed for Erectile Dysfunction. 30 tablet 5    rosuvastatin 10 MG Oral Tab Take 1 tablet (10 mg total) by mouth nightly. 90 tablet 3    fluticasone furoate-vilanterol (BREO ELLIPTA) 200-25 MCG/ACT Inhalation Aerosol Powder, Breath Activated Inhale 1 puff into the lungs daily. 84 each 3    montelukast 10 MG Oral Tab TAKE 1 TABLET BY MOUTH EVERY DAY AT NIGHT 90 tablet 3    albuterol 108 (90 Base) MCG/ACT Inhalation Aero Soln Inhale 2 puffs into the lungs every 4 (four) hours as needed for Wheezing or Shortness of Breath. 18 g 2    levothyroxine 200 MCG Oral Tab Take 1 tablet (200 mcg total) by mouth before breakfast. 90 tablet 3    Nebulizer Does not apply Misc Use every 4 hours with albuterol as needed. Disp #1  DX J45.40 1 each 0    methylPREDNISolone (MEDROL) 4 MG Oral Tablet Therapy Pack Dosepack: take as directed (Patient not taking: Reported on 6/25/2024) 21 tablet 0      Past Medical History:  Past Medical History:    Concussion    LOC for 1 hour    Current every day smoker     GERD (gastroesophageal reflux disease)    EGD  +Duodenitis, by Dr. Herman     Hyperlipidemia    Hypogonadism male    low testosterone. MRI pituitary normal. symptomatic    Hypothyroidism    Kidney stone    Moderate persistent asthma (HCC)    Obesity, Class II, BMI 35-39.9, with comorbidity    YO on CPAP    Pneumonia    Tongue biting    stitches      Past Surgical History:  Past Surgical History:   Procedure Laterality Date    Myringotomy, laser-assisted Bilateral infant , age 8      Family History:  Family History   Problem Relation Age of Onset    High Cholesterol Father     Heart Disease Father     Heart Surgery Father 58        Aortic aneursym, valve replacement    Diabetes Mother     Thyroid Disorder Mother     Melanoma Maternal Grandmother     Heart Disease Maternal Grandfather     Other (Aneurysm) Paternal Aunt         Cause of death    Asthma Sister     No Known Problems Brother     No Known Problems Daughter     No Known Problems Brother     No Known Problems Brother     No Known Problems Brother       Social History:  Social History     Socioeconomic History    Marital status:      Spouse name: Susan    Number of children: 1   Occupational History    Occupation: [employer]     Comment: heavy lifting, manual labor   Tobacco Use    Smoking status: Former     Current packs/day: 0.00     Types: Cigarettes     Quit date: 2001     Years since quittin.4    Smokeless tobacco: Never    Tobacco comments:     Quit again   Vaping Use    Vaping status: Never Used   Substance and Sexual Activity    Alcohol use: Yes     Alcohol/week: 0.0 standard drinks of alcohol     Comment: Rare    Drug use: No    Sexual activity: Yes     Partners: Female   Other Topics Concern    Caffeine Concern No    Occupational Exposure Yes     Comment: High exposure to dust and particulate at work. Doesn't use mask.    Stress Concern Yes    Weight Concern Yes    Exercise No    Reaction to local anesthetic No        Allergies:  Allergies   Allergen Reactions    Dust     Omeprazole OTHER (SEE COMMENTS)     Wife states pt had pancreatitis    Pollen         Objective:    VITALS: /82   Pulse 77   Temp 97.7 °F (36.5 °C) (Temporal)   Resp 16   Ht 6' 1\" (1.854 m)   Wt 295 lb (133.8 kg)   SpO2 95%   BMI 38.92 kg/m²      BP Readings from Last 3 Encounters:   06/25/24 122/82   09/07/23 (!) 162/87   05/20/23 130/76     Wt Readings from Last 3 Encounters:   06/25/24 295 lb (133.8 kg)   09/07/23 288 lb (130.6 kg)   05/20/23 289 lb 3.2 oz (131.2 kg)         PHYSICAL EXAM  GEN: pleasant, well-appearing in NAD, AOX3  SKIN: sunburn to back   HEENT: PERRL, EOMI, moist mucous membranes, oropharynx clear, TM clear, nares patent, no Thyromegaly or nodule.   CV: RRR, no murmurs or abnl heart sounds   PULM: Clear to auscultation, No wheezes, rales, rhonchi.  Non-labored breathing.  NEURO: CNs grossly intact, no focal weakness  MSK: moves all 4 extremities without difficulty; Left knee with bony enlargement; no meniscal irritation, no effusion.  Strength in tact.   PSYCH: mood and affect are appropriate       Approximately 50 minutes was spent: preparing to see the patient (reviewing prior tests, office notes, and consultant notes), personally obtaining a history, conducting a physical exam, counseling the patient on the plan of care, entering appropriate orders, and documenting clinical information in the electronic health record.           Joselin Arreola,     NOTE TO PATIENT: The 21st Century Cures Act makes clinical notes like these available to patients in the interest of transparency. Clinical notes are medical documents used by physicians and care providers to communicate with each other. These documents include medical language and terminology, abbreviations, and treatment information that may sound technical and at times possibly unfamiliar. In addition, at times, the verbiage may appear blunt or direct. These documents are  one tool providers use to communicate relevant information and clinical opinions of the care providers in a way that allows common understanding of the clinical context.

## 2024-07-30 ENCOUNTER — HOSPITAL ENCOUNTER (OUTPATIENT)
Dept: CV DIAGNOSTICS | Facility: HOSPITAL | Age: 49
Discharge: HOME | End: 2024-07-30
Attending: FAMILY MEDICINE
Payer: COMMERCIAL

## 2024-07-30 DIAGNOSIS — Z82.49 FAMILY HISTORY OF THORACIC AORTIC ANEURYSM: ICD-10-CM

## 2024-07-30 DIAGNOSIS — F17.200 TOBACCO DEPENDENCE: ICD-10-CM

## 2024-07-30 PROCEDURE — 93306 TTE W/DOPPLER COMPLETE: CPT | Performed by: FAMILY MEDICINE

## 2024-08-08 DIAGNOSIS — E03.9 HYPOTHYROIDISM IN ADULT: ICD-10-CM

## 2024-08-08 RX ORDER — LEVOTHYROXINE SODIUM 0.2 MG/1
200 TABLET ORAL
Qty: 90 TABLET | Refills: 3 | OUTPATIENT
Start: 2024-08-08

## 2024-08-08 NOTE — TELEPHONE ENCOUNTER
Medication(s) to Refill:   Requested Prescriptions     Pending Prescriptions Disp Refills    LEVOTHYROXINE 200 MCG Oral Tab [Pharmacy Med Name: LEVOTHYROXINE 200 MCG TABLET] 90 tablet 3     Sig: TAKE 1 TABLET (200 MCG TOTAL) BY MOUTH BEFORE BREAKFAST       Reason for Medication Refill being sent to Provider / Reason Protocol Failed:  [] 90 day refill has already been granted  [] Blood Pressure out of range  [x] Labs Abnormal/over due  [] Medication not previously prescribed by Provider  [] Non-Protocol Medication  [] Controlled Substance   [] Due for appointment- no future appointment scheduled  [] No Follow up specified    Last Time Medication was Filled:  5/20/2023 90 days 3 refills      Last Office Visit with PCP: 5/20/2023  Hypothyroidism in adult  - TSH+FREE T4; Future  - levothyroxine 200 MCG Oral Tab; Take 1 tablet (200 mcg total) by mouth before breakfast.  Dispense: 90 tablet; Refill: 3  - TSH+FREE T4  Continue levothyroxine 200 mcg MWF and 175 mcg Tuesday Thursday Saturday Sunday  Labs today- refill medications if controlled  Euthymic    When Patient was Due Back to the Office:  The patient indicates understanding of these issues and agrees to the plan.  Return in about 1 year (around 5/20/2024) for annual physical, sooner if needed..  (from when PCP last addressed condition)    Future Appointments:  Future Appointments   Date Time Provider Department Center   5/14/2025  1:00 PM Zhang Call MD [id number]       Last Blood Pressures:  BP Readings from Last 2 Encounters:   06/25/24 122/82   09/07/23 (!) 162/87       Recent Labs:  Free T4 (ng/dL)   Date Value   05/20/2023 1.1     FREE T4 (ng/dL)   Date Value   04/27/2013 1.1     TSH   Date Value   05/20/2023 1.100 mIU/mL   11/13/2015 7.35 mIU/L (H)     Action taken:  [] Refill approved per protocol  [x] Routing to provider for approval

## 2024-08-20 DIAGNOSIS — E03.9 HYPOTHYROIDISM IN ADULT: ICD-10-CM

## 2024-08-21 ENCOUNTER — TELEPHONE (OUTPATIENT)
Dept: FAMILY MEDICINE CLINIC | Facility: CLINIC | Age: 49
End: 2024-08-21

## 2024-08-21 RX ORDER — LEVOTHYROXINE SODIUM 200 UG/1
200 TABLET ORAL
Qty: 90 TABLET | Refills: 0 | OUTPATIENT
Start: 2024-08-21

## 2024-08-21 NOTE — TELEPHONE ENCOUNTER
RN to pt call, unable to leave detailed VM on unidentified mailbox.  Informed pt a MC message will be sent, to check MC and callback the office if he has any questions.  (LL 8/21/24).

## 2024-08-21 NOTE — TELEPHONE ENCOUNTER
Patients spouse calling for refill. Pharmacy kept sending to old office. Patient is about to run out. Please advise      levothyroxine 200 MCG Oral Tab     [id number] - [place], [place] - [address] [phone], [phone]

## 2024-08-21 NOTE — TELEPHONE ENCOUNTER
Medication(s) to Refill:   Requested Prescriptions     Pending Prescriptions Disp Refills    levothyroxine 200 MCG Oral Tab [Pharmacy Med Name: LEVOTHYROXINE 200 MCG TABLET] 90 tablet 0     Sig: TAKE 1 TABLET (200 MCG TOTAL) BY MOUTH BEFORE BREAKFAST     Reason for Medication Refill being sent to Provider / Reason Protocol Failed:  [] 90 day refill has already been granted  [] Blood Pressure out of range  [x] Labs Abnormal/over due  [] Medication not previously prescribed by Provider  [] Non-Protocol Medication  [] Controlled Substance   [] Due for appointment- no future appointment scheduled  [x]     Thyroid Medication Protocol [id number]/20/2024 02:26 PM   Protocol Details TSH in past 12 months    Last TSH value is normal    In person appointment or virtual visit in the past 12 mos or appointment in next 3 mos        Last Time Medication was Filled:  5/20/2023 90 days 3 refill      Last Office Visit with PCP: 5/20/2023  Hypothyroidism in adult  - TSH+FREE T4; Future  - levothyroxine 200 MCG Oral Tab; Take 1 tablet (200 mcg total) by mouth before breakfast.  Dispense: 90 tablet; Refill: 3  - TSH+FREE T4  Continue levothyroxine 200 mcg MWF and 175 mcg Tuesday Thursday Saturday Sunday  Labs today- refill medications if controlled  Euthymic    When Patient was Due Back to the Office:    Return in about 1 year (around 5/20/2024) for annual physical, sooner if needed.   (from when PCP last addressed condition)    Future Appointments:  Future Appointments   Date Time Provider Department Center   5/14/2025  1:00 PM Zhang Call MD [id number]     Last Blood Pressures:  BP Readings from Last 2 Encounters:   06/25/24 122/82   09/07/23 (!) 162/87     Recent Labs:  Free T4 (ng/dL)   Date Value   05/20/2023 1.1     FREE T4 (ng/dL)   Date Value   04/27/2013 1.1     TSH   Date Value   05/20/2023 1.100 mIU/mL   11/13/2015 7.35 mIU/L (H)     Action taken:  [] Refill approved per protocol  [x] Patient is overdue for lab work

## 2024-08-24 ENCOUNTER — LAB ENCOUNTER (OUTPATIENT)
Dept: LAB | Facility: HOSPITAL | Age: 49
End: 2024-08-24
Attending: FAMILY MEDICINE
Payer: COMMERCIAL

## 2024-08-24 DIAGNOSIS — R73.01 IMPAIRED FASTING GLUCOSE: ICD-10-CM

## 2024-08-24 DIAGNOSIS — R79.89 LOW TESTOSTERONE IN MALE: ICD-10-CM

## 2024-08-24 DIAGNOSIS — Z00.00 LABORATORY EXAM ORDERED AS PART OF ROUTINE GENERAL MEDICAL EXAMINATION: ICD-10-CM

## 2024-08-24 LAB
ALBUMIN SERPL-MCNC: 4.8 G/DL (ref 3.2–4.8)
ALBUMIN/GLOB SERPL: 1.9 {RATIO} (ref 1–2)
ALP LIVER SERPL-CCNC: 85 U/L
ALT SERPL-CCNC: 24 U/L
ANION GAP SERPL CALC-SCNC: 2 MMOL/L (ref 0–18)
AST SERPL-CCNC: 19 U/L (ref ?–34)
BASOPHILS # BLD AUTO: 0.07 X10(3) UL (ref 0–0.2)
BASOPHILS NFR BLD AUTO: 0.8 %
BILIRUB SERPL-MCNC: 0.6 MG/DL (ref 0.3–1.2)
BUN BLD-MCNC: 19 MG/DL (ref 9–23)
CALCIUM BLD-MCNC: 9.5 MG/DL (ref 8.7–10.4)
CHLORIDE SERPL-SCNC: 109 MMOL/L (ref 98–112)
CHOLEST SERPL-MCNC: 211 MG/DL (ref ?–200)
CO2 SERPL-SCNC: 30 MMOL/L (ref 21–32)
CREAT BLD-MCNC: 1.02 MG/DL
EGFRCR SERPLBLD CKD-EPI 2021: 90 ML/MIN/1.73M2 (ref 60–?)
EOSINOPHIL # BLD AUTO: 0.38 X10(3) UL (ref 0–0.7)
EOSINOPHIL NFR BLD AUTO: 4.2 %
ERYTHROCYTE [DISTWIDTH] IN BLOOD BY AUTOMATED COUNT: 13.3 %
FASTING PATIENT LIPID ANSWER: YES
FASTING STATUS PATIENT QL REPORTED: YES
GLOBULIN PLAS-MCNC: 2.5 G/DL (ref 2–3.5)
GLUCOSE BLD-MCNC: 114 MG/DL (ref 70–99)
HCT VFR BLD AUTO: 47.6 %
HDLC SERPL-MCNC: 35 MG/DL (ref 40–59)
HGB BLD-MCNC: 16.7 G/DL
IMM GRANULOCYTES # BLD AUTO: 0.09 X10(3) UL (ref 0–1)
IMM GRANULOCYTES NFR BLD: 1 %
LDLC SERPL CALC-MCNC: 135 MG/DL (ref ?–100)
LYMPHOCYTES # BLD AUTO: 3.03 X10(3) UL (ref 1–4)
LYMPHOCYTES NFR BLD AUTO: 33.4 %
MCH RBC QN AUTO: 30.1 PG (ref 26–34)
MCHC RBC AUTO-ENTMCNC: 35.1 G/DL (ref 31–37)
MCV RBC AUTO: 85.9 FL
MONOCYTES # BLD AUTO: 0.99 X10(3) UL (ref 0.1–1)
MONOCYTES NFR BLD AUTO: 10.9 %
NEUTROPHILS # BLD AUTO: 4.5 X10 (3) UL (ref 1.5–7.7)
NEUTROPHILS # BLD AUTO: 4.5 X10(3) UL (ref 1.5–7.7)
NEUTROPHILS NFR BLD AUTO: 49.7 %
NONHDLC SERPL-MCNC: 176 MG/DL (ref ?–130)
OSMOLALITY SERPL CALC.SUM OF ELEC: 295 MOSM/KG (ref 275–295)
PLATELET # BLD AUTO: 210 10(3)UL (ref 150–450)
POTASSIUM SERPL-SCNC: 4.4 MMOL/L (ref 3.5–5.1)
PROT SERPL-MCNC: 7.3 G/DL (ref 5.7–8.2)
RBC # BLD AUTO: 5.54 X10(6)UL
SODIUM SERPL-SCNC: 141 MMOL/L (ref 136–145)
T4 FREE SERPL-MCNC: 1 NG/DL (ref 0.8–1.7)
TRIGL SERPL-MCNC: 227 MG/DL (ref 30–149)
TSI SER-ACNC: 14.12 MIU/ML (ref 0.55–4.78)
VLDLC SERPL CALC-MCNC: 42 MG/DL (ref 0–30)
WBC # BLD AUTO: 9.1 X10(3) UL (ref 4–11)

## 2024-08-24 PROCEDURE — 84402 ASSAY OF FREE TESTOSTERONE: CPT

## 2024-08-24 PROCEDURE — 85025 COMPLETE CBC W/AUTO DIFF WBC: CPT

## 2024-08-24 PROCEDURE — 80053 COMPREHEN METABOLIC PANEL: CPT

## 2024-08-24 PROCEDURE — 83036 HEMOGLOBIN GLYCOSYLATED A1C: CPT

## 2024-08-24 PROCEDURE — 80061 LIPID PANEL: CPT

## 2024-08-24 PROCEDURE — 36415 COLL VENOUS BLD VENIPUNCTURE: CPT

## 2024-08-24 PROCEDURE — 84443 ASSAY THYROID STIM HORMONE: CPT

## 2024-08-24 PROCEDURE — 84439 ASSAY OF FREE THYROXINE: CPT

## 2024-08-24 PROCEDURE — 84403 ASSAY OF TOTAL TESTOSTERONE: CPT

## 2024-08-26 ENCOUNTER — TELEPHONE (OUTPATIENT)
Dept: INTERNAL MEDICINE CLINIC | Facility: CLINIC | Age: 49
End: 2024-08-26

## 2024-08-26 DIAGNOSIS — J45.40 MODERATE PERSISTENT ASTHMA WITHOUT COMPLICATION (HCC): ICD-10-CM

## 2024-08-26 DIAGNOSIS — J30.89 ENVIRONMENTAL AND SEASONAL ALLERGIES: ICD-10-CM

## 2024-08-26 DIAGNOSIS — E03.9 HYPOTHYROIDISM IN ADULT: ICD-10-CM

## 2024-08-26 DIAGNOSIS — E03.9 HYPOTHYROIDISM IN ADULT: Primary | ICD-10-CM

## 2024-08-26 RX ORDER — LEVOTHYROXINE SODIUM 200 UG/1
200 TABLET ORAL
Qty: 90 TABLET | Refills: 1 | Status: SHIPPED | OUTPATIENT
Start: 2024-08-26

## 2024-08-26 RX ORDER — LEVOTHYROXINE SODIUM 25 UG/1
25 TABLET ORAL
Qty: 90 TABLET | Refills: 0 | Status: SHIPPED | OUTPATIENT
Start: 2024-08-26

## 2024-08-26 NOTE — TELEPHONE ENCOUNTER
Please inform pt it looks like prior request were going to prior PCM which was reason for delay, please apologize.   TSH is quite elevated;  Ld like for him to take 200mcg and then an additional 25mcg, with plan to recheck TSH in 6-8 wks.  Please order lab as well.   RX sent to pharm

## 2024-08-26 NOTE — TELEPHONE ENCOUNTER
Patient called, upset that he is getting passed around on the phone. Assured patient that I was an RN and could assist.  Patient has been out of thyroid medication for 6 days, got recent labs drawn that are abnormal, patient states he feels \"out of wack\". Requesting a short course refill to get back to normal, then will get labs redrawn.   Patient currently on Levothyroxines 200 mcg. He would like a call back once medication is sent. OK to leave .             Component  Ref Range & Units 8/24/24  8:45 AM   TSH  0.550 - 4.780 mIU/mL 14.123 High      Component  Ref Range & Units 8/24/24  8:45 AM   Free T4  0.8 - 1.7 ng/dL 1.0

## 2024-08-26 NOTE — TELEPHONE ENCOUNTER
Called wife with message and recommendations. Apologized for delay. She confirms understanding. Order for repeat TSH in 6-8 weeks placed.

## 2024-08-26 NOTE — TELEPHONE ENCOUNTER
Patient's wife called, upset that patient has been out of thyroid medication for 6 days. He got labs drawn over the weekend.  Dr Arreola, ok to refill at current 200 mcg dosage?     Component  Ref Range & Units 8/24/24  8:45 AM   Free T4  0.8 - 1.7 ng/dL 1.0

## 2024-08-27 LAB
EST. AVERAGE GLUCOSE BLD GHB EST-MCNC: 126 MG/DL (ref 68–126)
HBA1C MFR BLD: 6 % (ref ?–5.7)

## 2024-08-28 LAB
FREE TESTOST DIRECT: 5.8 PG/ML
TESTOSTERONE: 149 NG/DL

## 2024-08-29 DIAGNOSIS — N52.9 ERECTILE DYSFUNCTION, UNSPECIFIED ERECTILE DYSFUNCTION TYPE: ICD-10-CM

## 2024-08-29 RX ORDER — LEVOTHYROXINE SODIUM 200 UG/1
200 TABLET ORAL
Qty: 90 TABLET | Refills: 3 | OUTPATIENT
Start: 2024-08-29

## 2024-08-29 RX ORDER — MONTELUKAST SODIUM 10 MG/1
TABLET ORAL
Qty: 90 TABLET | Refills: 0 | Status: SHIPPED | OUTPATIENT
Start: 2024-08-29

## 2024-08-29 NOTE — TELEPHONE ENCOUNTER
Refill Failed Protocol:     Pt requesting refill of   Requested Prescriptions     Pending Prescriptions Disp Refills    montelukast 10 MG Oral Tab 90 tablet 0     Sig: TAKE 1 TABLET BY MOUTH EVERY DAY AT NIGHT      Last Time Medication was Filled:  5/20/2023 90 days 3 refills    Last Office Visit with Provider: 5/20/2023  Moderate persistent asthma without complication  - montelukast 10 MG Oral Tab; TAKE 1 TABLET BY MOUTH EVERY DAY AT NIGHT  Dispense: 90 tablet; Refill: 3  - albuterol 108 (90 Base) MCG/ACT Inhalation Aero Soln; Inhale 2 puffs into the lungs every 4 (four) hours as needed for Wheezing or Shortness of Breath.  Dispense: 18 g; Refill: 2  - controlled  ACT 20  AAP-given and reviewed  Lifestyle medications for allergies discussed.   Dogs sleep in other family member bed  Continue current medications    Unable to approve refill,   Asthma & COPD Medication Protocol [id number]/26/2024 03:26 PM   Protocol Details Asthma Action Score greater than or equal to 20    Appointment in past 6 or next 3 months    AAP/ACT given in last 12 months        No future appointments.  Return in about 1 year (around 5/20/2024) for annual physical, sooner if needed..

## 2024-09-03 RX ORDER — TADALAFIL 20 MG/1
20 TABLET ORAL
Qty: 30 TABLET | Refills: 5 | OUTPATIENT
Start: 2024-09-03

## 2024-09-03 RX ORDER — TADALAFIL 20 MG/1
20 TABLET ORAL DAILY PRN
Qty: 30 TABLET | Refills: 0 | Status: SHIPPED | OUTPATIENT
Start: 2024-09-03

## 2024-09-11 ENCOUNTER — TELEPHONE (OUTPATIENT)
Dept: FAMILY MEDICINE CLINIC | Facility: CLINIC | Age: 49
End: 2024-09-11

## 2024-09-11 DIAGNOSIS — L91.8 SKIN TAG: Primary | ICD-10-CM

## 2024-09-11 NOTE — TELEPHONE ENCOUNTER
Referral entered. RN to pt call to notify, unable to leave detailed VM on unidentified mailbox.  Informed pt a MC message will be sent, to check for message and callback the office if he has any questions (LL 9/3/24).

## 2024-09-11 NOTE — TELEPHONE ENCOUNTER
Called pt to discuss lab results. Pt stated he would like a referral to dermatology regarding skin tags. He is looking for recommendation of derm in the [place] area.     Dr. Maxwell Milian for referral to Dr. Don for skin tags?

## 2024-09-18 DIAGNOSIS — R79.89 LOW TESTOSTERONE IN MALE: ICD-10-CM

## 2024-09-18 NOTE — TELEPHONE ENCOUNTER
Triage call transferred.   Spoke with pt f/u on refill request:  Testosterone 50 MG/5GM (1%) Transdermal Gel () 30 each 2 2024   Sig:   Place 50 mg onto the skin daily.       LOV 24  No future appts.  Informed will relay to PCP rx request.  Pt verbalized understanding and agreed with POC.      Rx pended for your approval if ok.  Please review and advise. Thank you.

## 2024-09-19 RX ORDER — TESTOSTERONE GEL, 1% 10 MG/G
50 GEL TRANSDERMAL DAILY
Qty: 30 EACH | Refills: 2 | Status: SHIPPED | OUTPATIENT
Start: 2024-09-19 | End: 2024-10-19

## 2024-10-08 ENCOUNTER — TELEPHONE (OUTPATIENT)
Dept: FAMILY MEDICINE CLINIC | Facility: CLINIC | Age: 49
End: 2024-10-08

## 2024-11-18 ENCOUNTER — PATIENT MESSAGE (OUTPATIENT)
Dept: FAMILY MEDICINE CLINIC | Facility: CLINIC | Age: 49
End: 2024-11-18

## 2024-11-18 DIAGNOSIS — J45.40 MODERATE PERSISTENT ASTHMA WITHOUT COMPLICATION (HCC): ICD-10-CM

## 2024-11-18 DIAGNOSIS — N52.9 ERECTILE DYSFUNCTION, UNSPECIFIED ERECTILE DYSFUNCTION TYPE: ICD-10-CM

## 2024-11-18 DIAGNOSIS — E03.9 HYPOTHYROIDISM IN ADULT: ICD-10-CM

## 2024-11-19 ENCOUNTER — TELEPHONE (OUTPATIENT)
Dept: FAMILY MEDICINE CLINIC | Facility: CLINIC | Age: 49
End: 2024-11-19

## 2024-11-19 DIAGNOSIS — J45.40 MODERATE PERSISTENT ASTHMA WITHOUT COMPLICATION (HCC): ICD-10-CM

## 2024-11-19 RX ORDER — ALBUTEROL SULFATE 90 UG/1
2 INHALANT RESPIRATORY (INHALATION) EVERY 4 HOURS PRN
Qty: 18 G | Refills: 2 | Status: CANCELLED | OUTPATIENT
Start: 2024-11-19

## 2024-11-19 RX ORDER — ALBUTEROL SULFATE 90 UG/1
2 INHALANT RESPIRATORY (INHALATION) EVERY 4 HOURS PRN
Qty: 18 G | Refills: 2 | Status: SHIPPED | OUTPATIENT
Start: 2024-11-19

## 2024-11-19 RX ORDER — FLUTICASONE FUROATE AND VILANTEROL 200; 25 UG/1; UG/1
1 POWDER RESPIRATORY (INHALATION) DAILY
Qty: 84 EACH | Refills: 3 | Status: SHIPPED | OUTPATIENT
Start: 2024-11-19

## 2024-11-19 RX ORDER — FLUTICASONE FUROATE AND VILANTEROL 200; 25 UG/1; UG/1
1 POWDER RESPIRATORY (INHALATION) DAILY
Qty: 84 EACH | Refills: 3 | Status: CANCELLED | OUTPATIENT
Start: 2024-11-19

## 2024-11-19 RX ORDER — TADALAFIL 20 MG/1
20 TABLET ORAL DAILY PRN
Qty: 30 TABLET | Refills: 2 | Status: SHIPPED | OUTPATIENT
Start: 2024-11-19

## 2024-11-19 NOTE — TELEPHONE ENCOUNTER
Jose Alonso requesting Medication Refill for:    Medication name and dose (copy and paste from medication list):   fluticasone furoate-vilanterol (BREO ELLIPTA) 200-25 MCG/ACT Inhalation Aerosol Powder, Breath Activated 8     And    albuterol 108 (90 Base) MCG/ACT Inhalation Aero Soln 18 g       If medication is not on medication list - transfer patient to RN queue for triage    Preferred Pharmacy: [facility]    LOV: 6/25/2024   Last Refill date: prescribed by previous provider  Next Scheduled appointment: LOV 6/24/24 as a new patient    Spoke to patient's wife, who's requesting a refill for both of these medications.  The patient stopped smoking 3 weeks ago and is experiencing difficulties breathing and requesting these 2 new medications be sent to [facility] AS SOON AS POSSIBLE.

## 2024-11-19 NOTE — TELEPHONE ENCOUNTER
Received call from pt's wife, Susan, checking on status. Per Susan, Breo and albuterol were filled by previous PCP, pt needing refill. Patient is quitting smoking (has been 3 weeks) and uses inhaler for shortness of breath. Susan said pt has had this before which is why previous PCP prescribed inhalers. Micelle trying to avoid needing to bring pt to UC/[facility], so asking if Dr. Arreola can refill them. Patient also sent below MC:    Jose Alonso to [facility] Clinical Staff (supporting Joselin Arreola DO)      11/18/24  8:20 PM  Hi , I am almost 3 weeks not smoking , can I please have my inhalers RX reordered to my pharmacy ,  I have tried to quit in the past and when I do quit my breathing becomes a bit harder due to quitting . I will also try to call the office tomorrow .  Espinoza Alonso       Refills pended if agreeable.

## 2024-11-19 NOTE — TELEPHONE ENCOUNTER
Patient requesting refill of albuterol inhaler and brevo  Last prescribed by previous provider  LOV 6/25/24    Please advise

## 2024-11-20 DIAGNOSIS — E03.9 HYPOTHYROIDISM IN ADULT: ICD-10-CM

## 2024-11-21 RX ORDER — FLUTICASONE FUROATE AND VILANTEROL 200; 25 UG/1; UG/1
1 POWDER RESPIRATORY (INHALATION) DAILY
Qty: 84 EACH | Refills: 3 | OUTPATIENT
Start: 2024-11-21

## 2024-11-21 RX ORDER — LEVOTHYROXINE SODIUM 25 UG/1
25 TABLET ORAL
Qty: 90 TABLET | Refills: 0 | OUTPATIENT
Start: 2024-11-21

## 2024-11-21 RX ORDER — ALBUTEROL SULFATE 90 UG/1
2 INHALANT RESPIRATORY (INHALATION) EVERY 4 HOURS PRN
Qty: 18 G | Refills: 2 | OUTPATIENT
Start: 2024-11-21

## 2024-11-21 NOTE — TELEPHONE ENCOUNTER
Requested Prescriptions     Refused Prescriptions Disp Refills    fluticasone furoate-vilanterol (BREO ELLIPTA) 200-25 MCG/ACT Inhalation Aerosol Powder, Breath Activated 84 each 3     Sig: Inhale 1 puff into the lungs daily.    albuterol 108 (90 Base) MCG/ACT Inhalation Aero Soln 18 g 2     Sig: Inhale 2 puffs into the lungs every 4 (four) hours as needed for Wheezing or Shortness of Breath.       The original prescription was reordered on 11/19/2024 by Joselin Arreola DO. Renewing this prescription may not be appropriate.

## 2024-11-22 RX ORDER — LEVOTHYROXINE SODIUM 25 UG/1
25 TABLET ORAL
Qty: 90 TABLET | Refills: 0 | OUTPATIENT
Start: 2024-11-22

## 2024-11-22 RX ORDER — LEVOTHYROXINE SODIUM 200 UG/1
200 TABLET ORAL
Qty: 90 TABLET | Refills: 1 | OUTPATIENT
Start: 2024-11-22

## 2024-11-22 NOTE — TELEPHONE ENCOUNTER
Levothyroxine 200mc month supply sent to [facility] in [place] on 2024    Levothyroxine 25MC month supply sent to [facility] in [place] on 2024

## 2024-11-23 DIAGNOSIS — E03.9 HYPOTHYROIDISM IN ADULT: ICD-10-CM

## 2024-11-27 RX ORDER — LEVOTHYROXINE SODIUM 25 UG/1
25 TABLET ORAL
Qty: 90 TABLET | Refills: 0 | Status: SHIPPED | OUTPATIENT
Start: 2024-11-27

## 2024-11-27 NOTE — TELEPHONE ENCOUNTER
Please review; protocol failed/ has no protocol    Requested Prescriptions   Pending Prescriptions Disp Refills    LEVOTHYROXINE 25 MCG Oral Tab [Pharmacy Med Name: LEVOTHYROXINE 25 MCG TABLET] 90 tablet 0     Sig: TAKE 1 TABLET BY MOUTH BEFORE BREAKFAST.       Thyroid Medication Protocol Failed - 11/27/2024  4:26 PM        Failed - Last TSH value is normal     Lab Results   Component Value Date    TSH 14.123 (H) 08/24/2024                 Passed - TSH in past 12 months        Passed - In person appointment or virtual visit in the past 12 mos or appointment in next 3 mos     Recent Outpatient Visits              5 months ago Establishing care with new doctor, encounter for    [facility], [address] [place] [address] Joselin Arreola DO    Office Visit    6 months ago Hypogonadism in male    [facility] Zhang Call MD    Office Visit    1 year ago Annual physical exam    [facility], [facility], Mirtha Gibson DO    Office Visit    1 year ago Hypogonadism in male    [facility] Zhang Call MD    Office Visit    2 years ago [facility]    [facility]          Future Appointments         Provider Department Appt Notes    In 5 months Zhang Call MD [facility] 1 yr follow up                       Recent Outpatient Visits              5 months ago Establishing care with new doctor, encounter for    [address] Joselin Arreola DO    Office Visit    6 months ago Hypogonadism in male    [facility] Zhang Call MD    Office Visit    1 year ago Annual physical exam    [facility], [facility], Mirtha Gibson DO    Office Visit    1 year ago  [facility] [place] [facility]    Office Visit    2 years ago [facility]ogonad[facility] [place] [facility]    Office Visit          Future Appointments         Provider Department Appt Notes    In 5 months [facility] [place] [facility]

## 2024-12-02 DIAGNOSIS — J45.40 MODERATE PERSISTENT ASTHMA WITHOUT COMPLICATION (HCC): ICD-10-CM

## 2024-12-02 DIAGNOSIS — J30.89 ENVIRONMENTAL AND SEASONAL ALLERGIES: ICD-10-CM

## 2024-12-02 RX ORDER — MONTELUKAST SODIUM 10 MG/1
TABLET ORAL
Qty: 90 TABLET | Refills: 0 | OUTPATIENT
Start: 2024-12-02

## 2025-01-02 ENCOUNTER — APPOINTMENT (OUTPATIENT)
Dept: GENERAL RADIOLOGY | Age: 50
End: 2025-01-02
Attending: NURSE PRACTITIONER
Payer: COMMERCIAL

## 2025-01-02 ENCOUNTER — HOSPITAL ENCOUNTER (OUTPATIENT)
Age: 50
Discharge: HOME | End: 2025-01-02
Payer: COMMERCIAL

## 2025-01-02 VITALS
HEART RATE: 70 BPM | DIASTOLIC BLOOD PRESSURE: 77 MMHG | OXYGEN SATURATION: 95 % | SYSTOLIC BLOOD PRESSURE: 147 MMHG | RESPIRATION RATE: 18 BRPM | TEMPERATURE: 98 F

## 2025-01-02 DIAGNOSIS — J11.1 INFLUENZA: Primary | ICD-10-CM

## 2025-01-02 LAB
POCT INFLUENZA A: POSITIVE
POCT INFLUENZA B: NEGATIVE
SARS-COV-2 RNA RESP QL NAA+PROBE: NOT DETECTED

## 2025-01-02 PROCEDURE — 87502 INFLUENZA DNA AMP PROBE: CPT | Performed by: NURSE PRACTITIONER

## 2025-01-02 PROCEDURE — A9150 MISC/EXPER NON-PRESCRIPT DRU: HCPCS | Performed by: NURSE PRACTITIONER

## 2025-01-02 PROCEDURE — U0002 COVID-19 LAB TEST NON-CDC: HCPCS | Performed by: NURSE PRACTITIONER

## 2025-01-02 PROCEDURE — 71046 X-RAY EXAM CHEST 2 VIEWS: CPT | Performed by: NURSE PRACTITIONER

## 2025-01-02 PROCEDURE — 99214 OFFICE O/P EST MOD 30 MIN: CPT | Performed by: NURSE PRACTITIONER

## 2025-01-02 RX ORDER — ALBUTEROL SULFATE 90 UG/1
2 INHALANT RESPIRATORY (INHALATION) EVERY 4 HOURS PRN
Qty: 1 EACH | Refills: 0 | Status: SHIPPED | OUTPATIENT
Start: 2025-01-02 | End: 2025-02-01

## 2025-01-02 RX ORDER — ACETAMINOPHEN 500 MG
1000 TABLET ORAL ONCE
Status: COMPLETED | OUTPATIENT
Start: 2025-01-02 | End: 2025-01-02

## 2025-01-02 RX ORDER — BENZONATATE 100 MG/1
100 CAPSULE ORAL 3 TIMES DAILY PRN
Qty: 30 CAPSULE | Refills: 0 | Status: SHIPPED | OUTPATIENT
Start: 2025-01-02 | End: 2025-02-01

## 2025-01-02 NOTE — DISCHARGE INSTRUCTIONS
You are diagnosed with influenza.  You are contagious to others until you have been fever free for 24 hours with no fever reducing medicines and your symptoms improve.  Tylenol and or ibuprofen for pain or fever.  Please also take over-the-counter antihistamines like Claritin or Zyrtec with Flonase nasal spray.  Stay hydrated.  Follow-up with your PCP.

## 2025-01-02 NOTE — ED PROVIDER NOTES
Patient Seen in: Immediate Care [facility]      History     Chief Complaint   Patient presents with    Cough/URI     Stated Complaint: congestion, runny nose, fever, chills, headaches, fatigue    Subjective:   This is a 49-year-old male with below stated medical history.  Presents to immediate care for URI symptoms.  Reports cough, fever, headache, nasal congestion, rhinorrhea, difficulty breathing, and wheezing.  Unknown when symptoms started.  He has not taken anything for symptoms.  No chest pain or shortness of breath.  States his wife was sick with similar symptoms prior to his.    The history is provided by the patient.             Objective:     Past Medical History:    Concussion    LOC for 1 hour    Current every day smoker    GERD (gastroesophageal reflux disease)    EGD  +Duodenitis, by Dr. Herman     Hyperlipidemia    Hypogonadism male    low testosterone. MRI pituitary normal. symptomatic    Hypothyroidism    Kidney stone    Moderate persistent asthma (HCC)    Obesity, Class II, BMI 35-39.9, with comorbidity    YO on CPAP    Pneumonia    Tongue biting    stitches              Past Surgical History:   Procedure Laterality Date    Myringotomy, laser-assisted Bilateral infant , age 8                Social History     Socioeconomic History    Marital status:      Spouse name: Susan    Number of children: 1   Occupational History    Occupation: [employer]     Comment: heavy lifting, manual labor   Tobacco Use    Smoking status: Former     Current packs/day: 0.00     Types: Cigarettes     Quit date: 2001     Years since quittin.0    Smokeless tobacco: Never    Tobacco comments:     Quit again   Vaping Use    Vaping status: Never Used   Substance and Sexual Activity    Alcohol use: Yes     Alcohol/week: 0.0 standard drinks of alcohol     Comment: Rare    Drug use: No    Sexual activity: Yes     Partners: Female   Other Topics Concern    Caffeine Concern No    Occupational  Exposure Yes     Comment: High exposure to dust and particulate at work. Doesn't use mask.    Stress Concern Yes    Weight Concern Yes    Exercise No    Reaction to local anesthetic No              Review of Systems   Constitutional:  Positive for chills and fever.   HENT:  Positive for congestion and rhinorrhea. Negative for ear discharge, ear pain and sore throat.    Respiratory:  Positive for cough and wheezing. Negative for shortness of breath and stridor.    Cardiovascular:  Negative for chest pain, palpitations and leg swelling.   Gastrointestinal:  Negative for abdominal pain, diarrhea, nausea and vomiting.   Genitourinary:  Negative for dysuria.   Musculoskeletal:  Negative for back pain, neck pain and neck stiffness.   Skin:  Negative for rash.   Neurological:  Positive for headaches.       Positive for stated complaint: congestion, runny nose, fever, chills, headaches, fatigue  Other systems are as noted in HPI.  Constitutional and vital signs reviewed.      All other systems reviewed and negative except as noted above.    Physical Exam     ED Triage Vitals [01/02/25 0813]   /77   Pulse 70   Resp 18   Temp 97.8 °F (36.6 °C)   Temp src Oral   SpO2 95 %   O2 Device None (Room air)       Current Vitals:   Vital Signs  BP: 147/77  Pulse: 70  Resp: 18  Temp: 97.8 °F (36.6 °C)  Temp src: Oral    Oxygen Therapy  SpO2: 95 %  O2 Device: None (Room air)        Physical Exam  Vitals and nursing note reviewed.   Constitutional:       General: He is not in acute distress.     Appearance: Normal appearance. He is normal weight. He is not ill-appearing, toxic-appearing or diaphoretic.   HENT:      Head: Normocephalic and atraumatic.      Right Ear: Tympanic membrane, ear canal and external ear normal. There is no impacted cerumen.      Left Ear: Tympanic membrane, ear canal and external ear normal. There is no impacted cerumen.      Nose: Congestion and rhinorrhea present.      Mouth/Throat:      Mouth: Mucous  membranes are moist.      Pharynx: Oropharynx is clear.   Eyes:      General: Lids are normal. Vision grossly intact. Gaze aligned appropriately. No allergic shiner, visual field deficit or scleral icterus.        Right eye: No foreign body, discharge or hordeolum.         Left eye: No foreign body, discharge or hordeolum.      Extraocular Movements: Extraocular movements intact.      Conjunctiva/sclera:      Right eye: Right conjunctiva is injected. No chemosis, exudate or hemorrhage.     Left eye: Left conjunctiva is injected. No chemosis, exudate or hemorrhage.     Pupils: Pupils are equal, round, and reactive to light.   Cardiovascular:      Rate and Rhythm: Normal rate.      Heart sounds: Normal heart sounds.   Pulmonary:      Effort: Pulmonary effort is normal.      Breath sounds: Normal breath sounds.   Musculoskeletal:      Cervical back: Neck supple.      Right lower leg: No edema.      Left lower leg: No edema.   Skin:     Capillary Refill: Capillary refill takes less than 2 seconds.      Findings: No rash.   Neurological:      Mental Status: He is alert and oriented to person, place, and time.   Psychiatric:         Mood and Affect: Mood normal.         Behavior: Behavior normal.             ED Course     Labs Reviewed   POCT FLU TEST - Abnormal; Notable for the following components:       Result Value    POCT INFLUENZA A Positive (*)     All other components within normal limits    Narrative:     This assay is a rapid molecular in vitro test utilizing nucleic acid amplification of influenza A and B viral RNA.   RAPID SARS-COV-2 BY PCR - Normal            Rapid flu, rapid covid, chest xray       MDM        Vital signs stable.  Patient presents to immediate care for influenza symptoms.     Differential diagnosis include but is not limited to influenza, COVID, other viral URI, pneumonia     Bilateral TMs clear.  Lung sounds clear bilaterally.  No respiratory distress or hypoxia.      Chest x-ray films  interpreted and reviewed by myself.  Results showed mild bronchitis with no consolidation concerning for pneumonia.    Rapid COVID is negative.  Rapid flu is positive for influenza A.    Patient is unknown when his symptoms started therefore is out of the window for Tamiflu.    DC home.  Rx given for Tessalon Perles and albuterol inhaler.  Symptomatic and supportive care discussed.  Quarantine reinforced.  Recommended close follow-up with Primary care provider.  Reasons to seek emergent care reviewed.  Patient and/or family verbalized understanding, and agreed plan of care.  All questions answered.            Medical Decision Making      Disposition and Plan     Clinical Impression:  1. Influenza         Disposition:  Discharge  1/2/2025  8:43 am    Follow-up:  Joselin Arreola DO  [address] [place] [id number]  [phone]      As needed          Medications Prescribed:  Current Discharge Medication List        START taking these medications    Details   benzonatate 100 MG Oral Cap Take 1 capsule (100 mg total) by mouth 3 (three) times daily as needed for cough.  Qty: 30 capsule, Refills: 0      !! albuterol 108 (90 Base) MCG/ACT Inhalation Aero Soln Inhale 2 puffs into the lungs every 4 (four) hours as needed for Wheezing.  Qty: 1 each, Refills: 0       !! - Potential duplicate medications found. Please discuss with provider.              Supplementary Documentation:

## 2025-02-16 DIAGNOSIS — E03.9 HYPOTHYROIDISM IN ADULT: ICD-10-CM

## 2025-02-20 RX ORDER — LEVOTHYROXINE SODIUM 200 UG/1
200 TABLET ORAL
Qty: 30 TABLET | Refills: 1 | Status: SHIPPED | OUTPATIENT
Start: 2025-02-20

## 2025-02-27 DIAGNOSIS — E03.9 HYPOTHYROIDISM IN ADULT: ICD-10-CM

## 2025-03-03 RX ORDER — LEVOTHYROXINE SODIUM 25 UG/1
25 TABLET ORAL
Qty: 30 TABLET | Refills: 1 | OUTPATIENT
Start: 2025-03-03

## 2025-03-03 NOTE — TELEPHONE ENCOUNTER
Please review.  Protocol failed / Has no protocol.    [employer] message sent to patient to complete thyroid follow-up labs.     Requested Prescriptions   Pending Prescriptions Disp Refills    levothyroxine 25 MCG Oral Tab [Pharmacy Med Name: LEVOTHYROXINE 25 MCG TABLET] 30 tablet 1     Sig: Take 1 tablet (25 mcg total) by mouth before breakfast. Take with 200mcg for total daily dose 225mcg.   **Please complete labs**       Thyroid Medication Protocol Failed - 3/3/2025 12:45 PM        Failed - Last TSH value is normal        Passed - TSH in past 12 months        Passed - In person appointment or virtual visit in the past 12 mos or appointment in next 3 mos        Passed - Medication is active on med list

## 2025-03-04 ENCOUNTER — TELEPHONE (OUTPATIENT)
Dept: FAMILY MEDICINE CLINIC | Facility: CLINIC | Age: 50
End: 2025-03-04

## 2025-03-04 NOTE — TELEPHONE ENCOUNTER
Triage call transferred.   Spoke with pt f/u on CPAP supplies that pt has not received. Per pt, saw Pulm- Dr. Tal Carias for sleep test and CPAP setup. Per pt, pulmonary has been back and forth with PCP office for CPAP supplies and have not heard a response. Pt does not know name of DME facility nor what is required from PCP.  Informed will contact Pulm to get further clarification on what is needed. No further questions or concerns. Pt verbalized understanding and agreed with POC.      Called Pulm, on wait for 10mins. Called again, 5 min wait.    Per Pulm, DME company- [employer] has been trying to reach out to PCP for authorization (pt has HMO) and have not received authorization.    Dr. Arreola- have you received any faxes regarding CPAP auth from [employer]?

## 2025-03-04 NOTE — TELEPHONE ENCOUNTER
Called [facility] [employer] with [facility] at [phone]- VM left to inform PCP office f/u on what is needed for pt's CPAP authorization as per PCP has not received any authorization request.

## 2025-03-07 ENCOUNTER — TELEPHONE (OUTPATIENT)
Dept: FAMILY MEDICINE CLINIC | Facility: CLINIC | Age: 50
End: 2025-03-07

## 2025-03-07 DIAGNOSIS — G47.33 OSA ON CPAP: Primary | ICD-10-CM

## 2025-03-07 NOTE — TELEPHONE ENCOUNTER
Given patients HM insurance status he needs to be seen by Pulm through [facility].

## 2025-03-07 NOTE — TELEPHONE ENCOUNTER
Pulm referral placed.     Called and spoke with pt. Notified pt of message below. Pt stated he already had this done and saw pulm in January. Notified pt that he saw Dr. Giraldo which is a [facility] pulmonologist. Notified with insurance needs to see pulm through [facility]. Pt stated that he can't take anymore of this and thought he was seeing the right provider in January. Notified unfortunately [facility] and [facility] are separate medical systems and so he will need to see an [facility] pulmonologist. Pt stated he will figure this out. Pt stated ok to send information through on .  sent.

## 2025-03-07 NOTE — TELEPHONE ENCOUNTER
Received faxed paperwork from [facility] for referral to [facility] for CPAP Supplies dated 3/5/25.    Last office visit with Dr Arreola was 6/25/24. No future appointment scheduled at this time.    Last OV with Pulmonary 1/14/25 with Dr Kristal Fowler for YO.    Dr Arreola, do you want referral placed for CPAP Equipt or refer to [facility]?  Please advise.

## 2025-03-13 ENCOUNTER — TELEPHONE (OUTPATIENT)
Dept: FAMILY MEDICINE CLINIC | Facility: CLINIC | Age: 50
End: 2025-03-13

## 2025-03-13 NOTE — TELEPHONE ENCOUNTER
Patient wife (Susan DAVIS per HIPAA) states that she is requesting for patient to have sleep study done at a sleep center. Susan states that she does not feel safe with him doing it at home. Will discuss at appointment on 3/28/25.

## 2025-03-13 NOTE — TELEPHONE ENCOUNTER
Patient spouse called regarding update on CPAP process and request for appointment for updated notes from PCP for sleep apnea.     Patient previously established with Dr. Aretha martin pulmonologist.     Has upcoming appointment with Dr. Ivey in August- stating will need new sleep study  Per patient wife they are stating he needs updated office notes from PCP office.    Patient is off  3/28, Dr. Arreola not in office that day, requesting appointment with any provider  Schedule patient with Dr. Argueta    LOV 6/25/24- Dr. Arreola     Routing as FYI,    Future Appointments   Date Time Provider Department Center   3/28/2025  7:00 AM Leah Argueta MD [facility]   5/14/2025  1:00 PM Zhang Call MD [id number]   8/11/2025  1:30 PM Cy Ivey,  [facility]

## 2025-03-14 NOTE — TELEPHONE ENCOUNTER
Patient is transitioning to [facility] for his CPAP/Supply needs.  This RN [facility] [place] trying to coordinate an appointment with them to discuss YO and CPAP/Supplies.  Appointment is now scheduled with Dr Arreola for 3/17/25 at 3:40pm,  Mali from Dr Johnston's Pulmonary office indicates Dr Arreola can document s/s when pt does not have his CPAP and then schedule him for an in lab Sleep Study since his last one was in 2012.  Once pt schedules the Sleep Study, he is to reach out to Mali and she will schedule pt to see [facility] within 3 days of the Sleep Study.  See Drug Response Dx messages sent to pt from aMli [facility]ARKER and [facility] regarding this subject.  Discussed this at length with pt as well and pt verbalizes understanding.    Dr Arreola, we should be able to help pt schedule his in lab Sleep Study after ordered to expedite the test.  Please let me know if we can help in this area.

## 2025-03-15 ENCOUNTER — LAB ENCOUNTER (OUTPATIENT)
Dept: LAB | Facility: HOSPITAL | Age: 50
End: 2025-03-15
Attending: FAMILY MEDICINE
Payer: COMMERCIAL

## 2025-03-15 DIAGNOSIS — Z12.5 SCREENING PSA (PROSTATE SPECIFIC ANTIGEN): ICD-10-CM

## 2025-03-15 DIAGNOSIS — E03.9 HYPOTHYROIDISM IN ADULT: ICD-10-CM

## 2025-03-15 LAB
COMPLEXED PSA SERPL-MCNC: 0.17 NG/ML (ref ?–4)
TSI SER-ACNC: 1.01 UIU/ML (ref 0.55–4.78)

## 2025-03-15 PROCEDURE — 84443 ASSAY THYROID STIM HORMONE: CPT

## 2025-03-15 PROCEDURE — 36415 COLL VENOUS BLD VENIPUNCTURE: CPT

## 2025-03-16 DIAGNOSIS — E03.9 HYPOTHYROIDISM IN ADULT: ICD-10-CM

## 2025-03-16 DIAGNOSIS — J45.40 MODERATE PERSISTENT ASTHMA WITHOUT COMPLICATION (HCC): ICD-10-CM

## 2025-03-17 ENCOUNTER — OFFICE VISIT (OUTPATIENT)
Dept: FAMILY MEDICINE CLINIC | Facility: CLINIC | Age: 50
End: 2025-03-17
Payer: COMMERCIAL

## 2025-03-17 VITALS
TEMPERATURE: 97 F | RESPIRATION RATE: 16 BRPM | HEIGHT: 73 IN | OXYGEN SATURATION: 97 % | SYSTOLIC BLOOD PRESSURE: 132 MMHG | DIASTOLIC BLOOD PRESSURE: 82 MMHG | HEART RATE: 68 BPM | WEIGHT: 294 LBS | BODY MASS INDEX: 38.97 KG/M2

## 2025-03-17 DIAGNOSIS — L91.8 INFLAMED SKIN TAG: ICD-10-CM

## 2025-03-17 DIAGNOSIS — J30.89 ENVIRONMENTAL AND SEASONAL ALLERGIES: ICD-10-CM

## 2025-03-17 DIAGNOSIS — E78.2 MIXED HYPERLIPIDEMIA: ICD-10-CM

## 2025-03-17 DIAGNOSIS — G47.33 OSA ON CPAP: Primary | ICD-10-CM

## 2025-03-17 DIAGNOSIS — J45.40 MODERATE PERSISTENT ASTHMA WITHOUT COMPLICATION (HCC): ICD-10-CM

## 2025-03-17 DIAGNOSIS — E03.9 HYPOTHYROIDISM IN ADULT: ICD-10-CM

## 2025-03-17 PROCEDURE — 3008F BODY MASS INDEX DOCD: CPT | Performed by: FAMILY MEDICINE

## 2025-03-17 PROCEDURE — 3075F SYST BP GE 130 - 139MM HG: CPT | Performed by: FAMILY MEDICINE

## 2025-03-17 PROCEDURE — 3079F DIAST BP 80-89 MM HG: CPT | Performed by: FAMILY MEDICINE

## 2025-03-17 PROCEDURE — 99215 OFFICE O/P EST HI 40 MIN: CPT | Performed by: FAMILY MEDICINE

## 2025-03-17 RX ORDER — ROSUVASTATIN CALCIUM 10 MG/1
10 TABLET, COATED ORAL NIGHTLY
Qty: 90 TABLET | Refills: 3 | Status: SHIPPED | OUTPATIENT
Start: 2025-03-17

## 2025-03-17 RX ORDER — LEVOTHYROXINE SODIUM 25 UG/1
25 TABLET ORAL
Qty: 90 TABLET | Refills: 1 | Status: SHIPPED | OUTPATIENT
Start: 2025-03-17

## 2025-03-17 RX ORDER — FLUTICASONE FUROATE AND VILANTEROL 200; 25 UG/1; UG/1
1 POWDER RESPIRATORY (INHALATION) DAILY
Qty: 84 EACH | Refills: 3 | Status: SHIPPED | OUTPATIENT
Start: 2025-03-17 | End: 2025-03-31

## 2025-03-17 RX ORDER — ALBUTEROL SULFATE 90 UG/1
2 INHALANT RESPIRATORY (INHALATION) EVERY 4 HOURS PRN
Qty: 6.7 EACH | Refills: 2 | Status: SHIPPED | OUTPATIENT
Start: 2025-03-17 | End: 2025-05-15

## 2025-03-17 RX ORDER — LEVOTHYROXINE SODIUM 200 UG/1
200 TABLET ORAL
Qty: 90 TABLET | Refills: 1 | Status: SHIPPED | OUTPATIENT
Start: 2025-03-17

## 2025-03-17 RX ORDER — MONTELUKAST SODIUM 10 MG/1
TABLET ORAL
Qty: 90 TABLET | Refills: 2 | Status: SHIPPED | OUTPATIENT
Start: 2025-03-17

## 2025-03-17 NOTE — PROGRESS NOTES
Family Medicine Progress Note  Assessment & Plan:   Follow-Up: No follow-ups on file.        Assessment & Plan  YO on CPAP  YO diagnosed 13 years ago with severe apnea (442 apneas per hour) requiring CPAP therapy. Current AHI is 2.9, indicating controlled symptoms with CPAP. He experiences significant daytime fatigue and apnea without CPAP. Insurance requires an in-lab sleep study to authorize CPAP supplies.  - Order in-lab sleep study to meet insurance requirements for CPAP supplies.  - Coordinate with Mali from pulmonology office to schedule follow-up within three days post-sleep study.  - Ensure documentation of symptoms without CPAP for insurance purposes.  - Provide printed information regarding the sleep study process and follow-up steps.  Orders:    Titration Sleep Study    General sleep study; Future; Expected date: 04/17/2025    Moderate persistent asthma without complication (HCC)  Chronic, Stable,  no recent exacerbations/hospitalizations. Managed with current inhalers.  Does want to address with new pulm. -   - Refill Singulair.   - Commended on tobacco cessation  Orders:    Montelukast Sodium; TAKE 1 TABLET BY MOUTH EVERY DAY AT NIGHT  Dispense: 90 tablet; Refill: 2    Environmental and seasonal allergies  Orders:    Montelukast Sodium; TAKE 1 TABLET BY MOUTH EVERY DAY AT NIGHT  Dispense: 90 tablet; Refill: 2    Hypothyroidism in adult  Chronic, stable; Last TSH appropriate  - Refill of Levothyroxine 225mcg   - Annual TSH   Orders:    Levothyroxine Sodium; Take 1 tablet (200 mcg total) by mouth before breakfast.  Dispense: 90 tablet; Refill: 1    Mixed hyperlipidemia  Currently on Rosuvastatin, last lipids 8/2024.  Would benefit from recheck.   Coronary Ca  - ordered 6/2024- not completed.    Orders:    Rosuvastatin Calcium; Take 1 tablet (10 mg total) by mouth nightly.  Dispense: 90 tablet; Refill: 3    Inflamed skin tag  Painful and inflamed skin tags on bilateral legs, with the left leg more  symptomatic, causing significant discomfort impacting daily activities.    Derm referral placed.    Orders:    DERM - EXTERNAL         Subjective:    CC: Referral (For pulmonologist.)  History of Present Illness:  History obtained from patient.     Jose Alonso is a 49 year old male who presents for Referral (For pulmonologist.)   Annual - Due   Labs- 8/2024  History of Present Illness  Jose Alonso is a 49 year old male with sleep apnea who presents with issues obtaining CPAP supplies.    SKIN TAGS--He reports experiencing intense pain from skin tags on his left leg, which he describes as feeling like his skin is 'ripping off'. The pain is significant enough to interfere with his work. Removal may not be covered by insurance unless deemed medically necessary.     TOBACCO=He is no longer smoking, quit in October     YO- > 10yrs. Has CPAP since then, with improvement in symptoms.   W/o cpap, has multiple episodes of apnea; daytime fatigue.   Prev followed by [facility]m with adequate AHI on CPAP in need of new supplies through   Dr. Cobos --- 10 yrs.   Initial Sleep Study 10/13/2012--  total of 442 apneas and hypopneas, 114 of which were either central or mixed apneas, giving an apnea-hypopnea index of 64 events per hour of sleep.  Supine AHI of 73,  non-supine AHI of 34.   Sleep disordered breathing was very severe both in REM and non-REM sleep.  Current CPAP settings- APAP 12-20, avg 16----% of days used > 4hrs: 99----AHI: 2.9    [facility]- [facility]AA- would like to have screening completed.   Mod persistent Asthma- Breo BID, DELANEY,   HLD-Rosuvastation 10  Hypothyroidism-Levo 200mcg   Low T, ED- testosterone 100mg q2 in the past.   Recurrent Kidney Stones- Dr. Call   YO-CPAP  Tobacco Cessation 10/2024- 1/2 ppd ffor 33ys - interested in Lung Ca screening at 49   Pshx: Myringotomy-  All: Dust, omeprazole, pollen   Fam hx:  AAA- F, PA;   Soc hx: , works in dock/door installation.    Colonoscopy: 11/02/2022      History/Other:   ROS-Per HPI     Problem List:  Patient Active Problem List   Diagnosis    YO on CPAP    Moderate persistent asthma (HCC)    Hypothyroidism in adult    Environmental and seasonal allergies    Mixed hyperlipidemia    Nephrolithiasis    Hydronephrosis with ureteropelvic junction (UPJ) obstruction    Chronic right shoulder pain    BMI 39.0-39.9,adult    Chronic left shoulder pain    Libido, decreased    Low testosterone in male       Current Medications:  Current Outpatient Medications   Medication Sig Dispense Refill    levothyroxine 200 MCG Oral Tab Take 1 tablet (200 mcg total) by mouth before breakfast. 30 tablet 1    levothyroxine 25 MCG Oral Tab Take 1 tablet (25 mcg total) by mouth before breakfast. 90 tablet 0    Tadalafil 20 MG Oral Tab Take 1 tablet (20 mg total) by mouth daily as needed for Erectile Dysfunction. 30 tablet 2    albuterol 108 (90 Base) MCG/ACT Inhalation Aero Soln Inhale 2 puffs into the lungs every 4 (four) hours as needed for Wheezing or Shortness of Breath. 18 g 2    fluticasone furoate-vilanterol (BREO ELLIPTA) 200-25 MCG/ACT Inhalation Aerosol Powder, Breath Activated Inhale 1 puff into the lungs daily. 84 each 3    montelukast 10 MG Oral Tab TAKE 1 TABLET BY MOUTH EVERY DAY AT NIGHT 90 tablet 0    rosuvastatin 10 MG Oral Tab Take 1 tablet (10 mg total) by mouth nightly. 90 tablet 3    Nebulizer Does not apply Misc Use every 4 hours with albuterol as needed. Disp #1  DX J45.40 1 each 0    albuterol 108 (90 Base) MCG/ACT Inhalation Aero Soln Inhale 2 puffs into the lungs every 4 (four) hours as needed for Wheezing. 1 each 0    methylPREDNISolone (MEDROL) 4 MG Oral Tablet Therapy Pack Dosepack: take as directed (Patient not taking: Reported on 3/17/2025) 21 tablet 0      Past Medical History:  Past Medical History:    Concussion    LOC for 1 hour    Current every day smoker    GERD (gastroesophageal reflux disease)    EGD 2014 +Duodenitis, by Dr. Herman      Hyperlipidemia    Hypogonadism male    low testosterone. MRI pituitary normal. symptomatic    Hypothyroidism    Kidney stone    Moderate persistent asthma (HCC)    Obesity, Class II, BMI 35-39.9, with comorbidity    YO on CPAP    Pneumonia    Tongue biting    stitches      Past Surgical History:  Past Surgical History:   Procedure Laterality Date    Myringotomy, laser-assisted Bilateral infant , age 8      Family History:  Family History   Problem Relation Age of Onset    High Cholesterol Father     Heart Disease Father     Heart Surgery Father 58        Aortic aneursym, valve replacement    Diabetes Mother     Thyroid Disorder Mother     Melanoma Maternal Grandmother     Heart Disease Maternal Grandfather     Other (Aneurysm) Paternal Aunt         Cause of death    Asthma Sister     No Known Problems Brother     No Known Problems Daughter     No Known Problems Brother     No Known Problems Brother     No Known Problems Brother       Social History:  Social History     Socioeconomic History    Marital status:      Spouse name: Susan    Number of children: 1   Occupational History    Occupation: [employer]     Comment: heavy lifting, manual labor   Tobacco Use    Smoking status: Former     Current packs/day: 0.00     Types: Cigarettes     Quit date: 2001     Years since quittin.2    Smokeless tobacco: Never    Tobacco comments:     Quit again   Vaping Use    Vaping status: Never Used   Substance and Sexual Activity    Alcohol use: Yes     Alcohol/week: 0.0 standard drinks of alcohol     Comment: Rare    Drug use: No    Sexual activity: Yes     Partners: Female   Other Topics Concern    Caffeine Concern No    Occupational Exposure Yes     Comment: High exposure to dust and particulate at work. Doesn't use mask.    Stress Concern Yes    Weight Concern Yes    Exercise No    Reaction to local anesthetic No       Allergies:  Allergies   Allergen Reactions    Dust     Omeprazole OTHER (SEE COMMENTS)      Wife states pt had pancreatitis    Pollen         Objective:    VITALS: /82   Pulse 68   Temp 96.7 °F (35.9 °C) (Temporal)   Resp 16   Ht 6' 1\" (1.854 m)   Wt 294 lb (133.4 kg)   SpO2 97%   BMI 38.79 kg/m²      BP Readings from Last 3 Encounters:   03/17/25 132/82   01/02/25 147/77   06/25/24 122/82     Wt Readings from Last 3 Encounters:   03/17/25 294 lb (133.4 kg)   06/25/24 295 lb (133.8 kg)   09/07/23 288 lb (130.6 kg)         PHYSICAL EXAM  GEN: pleasant, well-appearing in NAD, AOX3  HEENT: PERRL, EOMI, moist mucous membranes,   CV: RRR, no murmurs or abnl heart sounds   PULM: Clear to auscultation, No wheezes, rales, rhonchi.  Non-labored breathing.  NEURO: CNs grossly intact, no focal weakness  MSK: moves all 4 extremities without difficulty;   PSYCH: mood and affect are appropriate       Approximately 50 minutes was spent: preparing to see the patient (reviewing prior tests, office notes, and consultant notes), personally obtaining a history, conducting a physical exam, counseling the patient on the plan of care, entering appropriate orders, and documenting clinical information in the electronic health record.           Joselin Arreola,     NOTE TO PATIENT: The 21st Century Cures Act makes clinical notes like these available to patients in the interest of transparency. Clinical notes are medical documents used by physicians and care providers to communicate with each other. These documents include medical language and terminology, abbreviations, and treatment information that may sound technical and at times possibly unfamiliar. In addition, at times, the verbiage may appear blunt or direct. These documents are one tool providers use to communicate relevant information and clinical opinions of the care providers in a way that allows common understanding of the clinical context.

## 2025-03-31 ENCOUNTER — OFFICE VISIT (OUTPATIENT)
Facility: CLINIC | Age: 50
End: 2025-03-31
Payer: COMMERCIAL

## 2025-03-31 VITALS
DIASTOLIC BLOOD PRESSURE: 78 MMHG | HEIGHT: 73 IN | SYSTOLIC BLOOD PRESSURE: 142 MMHG | RESPIRATION RATE: 18 BRPM | BODY MASS INDEX: 38.97 KG/M2 | HEART RATE: 78 BPM | WEIGHT: 294 LBS | OXYGEN SATURATION: 97 %

## 2025-03-31 DIAGNOSIS — J45.31 MILD PERSISTENT ASTHMA WITH EXACERBATION (HCC): ICD-10-CM

## 2025-03-31 DIAGNOSIS — J30.9 ALLERGIC RHINITIS, UNSPECIFIED SEASONALITY, UNSPECIFIED TRIGGER: ICD-10-CM

## 2025-03-31 DIAGNOSIS — G47.33 OSA ON CPAP: Primary | ICD-10-CM

## 2025-03-31 PROCEDURE — 3077F SYST BP >= 140 MM HG: CPT | Performed by: INTERNAL MEDICINE

## 2025-03-31 PROCEDURE — 99204 OFFICE O/P NEW MOD 45 MIN: CPT | Performed by: INTERNAL MEDICINE

## 2025-03-31 PROCEDURE — 3008F BODY MASS INDEX DOCD: CPT | Performed by: INTERNAL MEDICINE

## 2025-03-31 PROCEDURE — 3078F DIAST BP <80 MM HG: CPT | Performed by: INTERNAL MEDICINE

## 2025-03-31 RX ORDER — PREDNISONE 10 MG/1
TABLET ORAL
Qty: 10 TABLET | Refills: 0 | Status: SHIPPED | OUTPATIENT
Start: 2025-03-31

## 2025-03-31 RX ORDER — AZELASTINE HYDROCHLORIDE 137 UG/1
1-2 SPRAY, METERED NASAL 2 TIMES DAILY
Qty: 1 EACH | Refills: 3 | Status: SHIPPED | OUTPATIENT
Start: 2025-03-31 | End: 2025-04-30

## 2025-03-31 RX ORDER — FLUTICASONE PROPIONATE AND SALMETEROL 250; 50 UG/1; UG/1
1 POWDER RESPIRATORY (INHALATION) EVERY 12 HOURS SCHEDULED
Qty: 1 EACH | Refills: 5 | Status: SHIPPED | OUTPATIENT
Start: 2025-03-31

## 2025-03-31 NOTE — PATIENT INSTRUCTIONS
Plan:      Initiate auto-titrating CPAP at 12-20 cwp at bedtime  Advise DME company to perform a Mask Fit   Then Obtain Download data for compliance and efficacy from   Azelastine 137 mcg 1 puff twice daily as needed for congested and runny nose   Flonase 2 puffs each nostril daily  Normal saline OTC nasal spray 2 puffs 4 times daily as needed to prevent nasal dryness   Prednisone 20 mg oral daily for 5 days   Advair HFA /21 one puff twice daily   Albuterol HFA  MDI 2 puffs 4 times daily as needed   Continue Montelukast 10 mg oral daily   Advised about weight loss   Advised against drowsy driving and to avoid alcoholic beverage and respiratory depressants as these may worsen sleep apnea      Follow up: 4  months     Alexy Johnston MD      Obstructive Sleep Apnea  Obstructive sleep apnea is a condition caused by air passages becoming narrowed or blocked during sleep. As a result, breathing stops for short periods. Your body wakes up enough for breathing to start again. But you don't remember it. The cycle of stopped breathing and brief awakenings can repeat dozens of times a night. This prevents the body from getting to the deeper stages of sleep that are needed for good rest.   Signs of sleep apnea include loud snoring, noisy breathing, and gasping sounds during sleep. People with sleep apnea often find they use the bathroom many times during the night. Daytime symptoms include waking up tired after a full night's sleep and waking up with headaches. They can also include feeling very sleepy or falling asleep during the day, and having problems with memory or concentration.   Risk factors for sleep apnea include:  Being overweight  Being assigned male at birth, or being in menopause  Smoking  Using alcohol or sedating medicines  Having enlarged structures in the nose or throat such as enlarged tonsils or adenoids, or extra tissue in the airway  Home care  Lifestyle changes that can  help treat snoring and sleep apnea include:   If you're overweight, talk with your healthcare provider about a weight-loss plan for you.  Don't drink alcohol for 3 to 4 hours before bedtime.  Don't take sedating medicines. Ask your healthcare provider about the medicines you take.  If you smoke, talk to your provider about ways to quit. It's important to stay away from secondhand smoke. Don't use e-cigarettes because of their harmful side effects.  Sleep on your side. This can help prevent gravity from pulling relaxed throat tissues into your breathing passages.  If you have allergies or sinus problems that block your nose, ask your provider for help.  Use positive airway pressure (PAP). Discuss with your provider the benefits of using PAP at home. And talk about the type of PAP that's best for you.  Follow-up care  Follow up with your healthcare provider, or as advised. A diagnosis of sleep apnea is made with a sleep study. Your provider can tell you more about this test.   When to get medical care  See your healthcare provider if you have daytime symptoms of sleep apnea. These include:   Waking up tired after a full night's sleep  Waking up with a headache  Feeling very sleepy or falling asleep during the day  Having problems with memory or concentration  Also talk with your provider if your partner tells you that you snore, gasp for air, or stop breathing while you sleep.   Seeing your provider is important because sleep apnea can make you more likely to have certain health problems. These include high blood pressure, heart attack, stroke, and sexual dysfunction. If you have sleep apnea, talk with your healthcare provider about the best treatments for you.   [employer] last reviewed this educational content on 5/1/2022 © 2000-2023 [employer]. All rights reserved. This information is not intended as a substitute for professional medical care. Always follow your healthcare professional's  instructions.        Continuous Positive Air Pressure (CPAP)     A mask over the nose gently directs air into the throat to keep the airway open.     Continuous positive air pressure (CPAP) uses gentle air pressure to hold the airway open. CPAP is often the most effective treatment for sleep apnea. It works very well as a treatment for adults diagnosed with obstructive sleep apnea with a lot of sleepiness. But keep in mind that it can take several adjustments before the setup is right for you.   How CPAP works  The CPAP machine  is a small portable pump that sits beside the bed. The pump sends air through a hose, which is held over your nose alone, or nose and mouth by a mask. Mild air pressure is gently pushed through your airway. The air pressure nudges sagging tissues aside. This widens the airway so you can breathe better. CPAP may be combined with other kinds of therapy for sleep apnea.   Types of air pressure treatments  There are different types of CPAP. Your doctor or CPAP technician will help you decide which type is best for you:   Basic CPAP keeps the pressure constant all night long.  A bilevel device (BiPAP) provides more pressure when you breathe in and less when you breathe out. A BiPAP machine also may be set to provide automatic breaths to maintain breathing if you stop breathing while sleeping.  An autoCPAP device automatically adjusts pressure throughout the night and in response to changes such as body position, sleep stage, and snoring.  Mary last reviewed this educational content on 7/1/2019  © 6395-6408 [employer]. All rights reserved. This information is not intended as a substitute for professional medical care. Always follow your healthcare professional's instructions.       Asthma  What is asthma?  Asthma is a long-term (chronic) ?lung disease. The airways react to triggers (allergens and irritants). This makes it hard to breathe. With exposure to triggers, changes can  occur, such as:   The airways become swollen and inflamed.  The muscles around the airways tighten.  More mucus is made.  All of these changes make the airways narrow. This makes it hard for air to go out of the lungs. And fresh oxygen can't get into the body.   What causes asthma?  Experts don't know the exact cause of asthma. They believe it's partly inherited. The environment, infections, and chemicals released by the body also play a role.   Exercise causes symptoms in many people with asthma. Symptoms can occur during exercise. They can also occur right after exercise. In some people, stress or strong feelings can cause symptoms.   All of these may be asthma triggers:   Allergens Respiratory problem       Pollens (trees, grasses, and weeds)  Mold  Pets  Dust and dust mites  Cockroaches  Mice     Nasal allergies  Sinus infections  The flu  Viral infections, including the common cold   Irritants Medicines       Strong odors from perfumes, , cooking, paints, and varnishes  Chemicals (gases, fumes)  Air pollution  Changing weather (temperature, barometric pressure, humidity, and strong winds)  Smoke (tobacco-inhaled or secondhand)     Aspirin  NSAIDs (nonsteroidal anti-inflammatory drugs), such as ibuprofen  Beta blockers   Other conditions Other       GERD (gastroesophageal reflux)  Sleep apnea  Overweight  Depression     Exercise, especially in cold weather  Strong feelings that go along with laughing or crying       Who is at risk for asthma?  It's most common in:   Children and teens ages 5 to17  People living in cities  Other factors include:  Personal or family history of asthma or allergies  Exposure to secondhand tobacco smoke  Children with a family history of asthma  Children who have allergies or atopic dermatitis  Children exposed to secondhand and tobacco smoke  Living in areas with high levels of environmental air pollution  What are the symptoms of asthma?   Symptoms include:  Trouble  breathing or shortness of breath  Chest tightness  Wheezing or a whistling sound when breathing  Coughing  Breathing becomes harder and may hurt  Talking and sleeping may be harder with severe symptoms  How is asthma diagnosed?  Your healthcare provider will ask about your health history. They will give you a physical exam. You will also have other tests. An important test is spirometry.   A spirometer is a device used to find out how well the lungs are working. It measures the amount and speed of air breathed out. Spirometry is typically done only in children over the age of 6 and adults.   You may have other tests. These are done to check for conditions, such as allergies.   How is asthma treated?  Treatment will depend on your symptoms, age, and general health. It will also depend on how severe the condition is.   There is no cure for asthma. It can often be controlled by staying away from triggers. And by taking medicines as prescribed by your healthcare provider.   Watching for symptoms and taking early action is a key part of asthma care. So is knowing what to do if symptoms get worse. Experts advise making an Asthma Action Plan with your provider. Also educate your family and close friends the plan. This will help them provide correct asthma care if you are ill and can't care for yourself.   Medicines for asthma  The 2 types of asthma medicines are long-term control and short-term (quick-relief) medicines. Long-term control medicines are often taken every day. They help prevent symptoms. Quick-relief medicines calm asthma symptoms fast. But they only last for a short time. You may take either type of medicine alone. Some people take both.   Your healthcare provider should regularly check and adjust your medicines as needed.   Long-term control medicines   At first, it may take a few weeks for long-term control medicines to work. You must take these medicines every day. These medicines include:    Anti-inflammatory medicines. These reduce or prevent airway swelling.  Bronchodilators. These relax muscles around the airways.  Leukotriene modifiers. These block the action of chemicals called leukotrienes. These are chemicals that cause airways to be inflamed and narrowed.   Biologic therapy.  There are some newer medicines for people with asthma that isn't well-controlled despite inhaler therapy. These target the inflammatory cells in the body that start the asthma reaction. These medicines include anti-IL4, Anti-IL5, and anti-IgE medicines. They are often given by shot (injection) or infusion.  Quick-relief medicines  Quick-relief medicines work fast to relax the muscles around the airways. But the relief only lasts about 2 to 3 hours.   These medicines may include:  Inhaled short-acting beta2-agonists.  These help relax muscles around the airways.  Inhaled anticholinergics. These block a chemical in the body called acetylcholine. This chemical contracts the muscles. It also causes more mucus in the airways.  Inhalation devices for asthma   Inhaled medicines go right to the lungs. They have fewer side effects than medicines taken by mouth. Inhaled medicines may be anti-inflammatory or bronchodilating. Or they may be both. The devices used are:   Metered-dose inhaler (MDI). This is the most common type of inhaler. It uses a chemical to push the medicine out of the inhaler. MDIs are held in front of or put into the mouth. Then the medicine is released in puffs. Or they may be used with a spacer device.  Nebulizer. This device sprays a fine mist of medicine. This is done through a mask using air under pressure, or an ultrasonic machine. A mouthpiece or mask is connected to a machine by plastic tubing to deliver medicine.  Dry powder or rotary inhaler. These inhalers deliver powered medicine as you breathe.  Living with asthma  Staying away from triggers is key in managing asthma. Triggers are specific to the  individual and may include such things as allergens, irritants, other health problems, exercise, medicines, and strong emotions. The following can help you limit your exposure:   Allergies  Dust. Dust is the most common year-round allergen. The allergy is caused by tiny dust mites. Dust mites are found in mattresses, carpets, and fabric-covered (upholstered) furniture, such as sofas and chairs. They live best in warm, humid conditions. It's important to limit your exposure. Keep your living area as clean as possible by vacuuming and dusting on a weekly basis. Keep the use of carpets to a minimum and take extra care in the bedroom. Put dust mite covers on your mattress, box spring, and pillows.  Pollens. You may be allergic to pollen. If so, during pollen season keep all car and house windows closed. Use air conditioning if possible. If you have been outside, shower, wash your hair, and change clothes when you go inside.   Pets. Pets that have fur or feathers often cause allergies. If you have pets, try not to touch them. If you do pet or handle them, wash your hands afterward. Keep pets off your furniture, bed, and out of your bedroom. Have someone brush and bathe your pet often.  Mold and mildew. These can trigger asthma. When outside, stay away from damp, shady areas. Use exhaust fans when cooking or bathing. Keep indoor humidity below 45%. And drain and clean your dehumidifier often.    Exercise  Exercise is a common asthma trigger. But don't limit sports or exercise unless a healthcare provider tells you to. Exercise is good for your health and lungs. Swimming, golf, and karate are good choices if you have asthma. Always warm up before exercise. And cool down after. Ask your provider about using your quick-relief medicine before starting exercise. Keep a log of what types of exercise trigger asthma problems and talk with your provider about possible ways to manage your symptoms.   Irritants  If you smoke, quit.  This is hard to do, but your healthcare provider can help provide resources to aid your success.   Stay away from secondhand and thirdhand smoke. Don't let people smoke in your car or in your home.   Also, stay away from other types of smoke. Don’t use wood stoves or kerosene heaters. If you live in an area with air pollution, stay indoors during bad air days and wear a mask if you have to go outside. Also stay away from strong perfumes, cleaning products, fresh paint, and other things with strong odors.   Medicines  Some medicines can make asthma symptoms worse. These medicines include aspirin, NSAIDs (nonsteroidal anti-inflammatory drugs), and beta-blockers. Talk with your healthcare provider about your asthma history and medicine use.   Other health problems  Some health problems can make it harder to control asthma. These include:   Respiratory infections, such as colds and the flu  GERD (gastroesophageal reflux) and heartburn  Being overweight  Sleep apnea  Depression    Work with your healthcare provider to treat any of these problems.   Strong emotions  The strong feelings that go with laughing and crying can trigger asthma symptoms. Stress and anxiety can also trigger asthma. If you are having trouble with stress and strong emotions, speak with your healthcare provider.   How daily issues affect your health  Many things in your daily life impact your health. This can include transportation, money problems, housing, access to food, and childcare. If you can’t get to medical appointments, you may not get the care you need. When money is tight, it may be hard to pay for medicines. And living far from a grocery store can make it hard to buy healthy food.   If you have concerns in any of these or other areas, talk with your healthcare team. They may know of local resources to help you. Or they may have a staff person who can help.   Key points about asthma  Asthma is a long-term (chronic) lung disease.  Triggers  irritate sensitive airways. This makes it hard to breathe.  Staying away from triggers is an important part of treatment.  Long-term medicines control symptoms. They are taken every day, even when you feel well.  Rescue medicines provide quick symptom relief. But they are short-term.  An Asthma Action Plan can help patients and family members take appropriate, timely steps to control symptoms.    Next steps  Tips to help you get the most from a visit to your healthcare provider:   Know the reason for your visit and what you want to happen.  Before your visit, write down questions you want answered.  Bring someone with you to help you ask questions and remember what your provider tells you.  At the visit, write down the name of a new diagnosis, and any new medicines, treatments, or tests. Also write down any new instructions your provider gives you.  Know why a new medicine or treatment is prescribed, and how it will help you. Also know what the side effects are.  Ask if your condition can be treated in other ways.  Know why a test or procedure is recommended and what the results could mean.  Know what to expect if you do not take the medicine or have the test or procedure.  If you have a follow-up appointment, write down the date, time, and purpose for that visit.  Know how you can contact your provider if you have questions.  [employer] last reviewed this educational content on 1/1/2023 © 2000-2023 [employer]. All rights reserved. This information is not intended as a substitute for professional medical care. Always follow your healthcare professional's instructions.      Getting the best readings -- Several steps are important to make sure the peak flow meter records an accurate value:  ?The peak flow meter should read zero or its lowest reading when not in use.  ?Use the peak flow meter while standing up straight.  ?Take in as deep a breath as possible.  ?Place the peak flow meter in the mouth, with  the tongue under the mouthpiece.  ?Close the lips tightly around the mouthpiece.  ?Blow out as hard and fast as possible; do not throw the head forward while blowing out.  ?Breathe a few normal breaths and then repeat the process two more times. Write down the highest number obtained. Do not average the numbers.

## 2025-03-31 NOTE — PROGRESS NOTES
Pulmonary/Critical Care/Sleep Medicine    Consult Note     PCP: Joselin Arreola DO   Phone: [phone]   Fax: [phone]       Chief Complaint   Patient presents with    New Patient     Sleep Consult / need updated baseline sleep study  FFM - large  Dme: premier - need new dme / cpap device       HPI  I had the pleasure of seeing Jose Alonso who is a pleasant 49 year old male who presents for evaluation of YO        The patient states that he has snored at home in past and was noted to have witnessed apnea, a sleep study performed showed YO and he has been on auto CPAP 12-20 cwp, his CPAP machine is 9 years old and he ran out of supplies,  so he presents for sleep evaluation.      He states bed time around 830-9 PM . It takes 30 min to fall asleep and leaves bed around 345 AM. He wakes up sometimes 1-2 times a night.  He is NOT sleepy  during the daytime.  He denies nightmares, sleep talking or sleep walking.  He denies AM headaches.  He denies symptoms sleep attacks     The patient denies kicking legs at night. Denies  teeth grinding.    The patient reports using autp CPAP daily at night at 12-20 cmH2O regularly throughout the night for about 7 hours and states that the  PAP machine is quiet and has a heated humidifier.     He drinks 3 cups of caffeine coffee daily,  occ caffeine glasses of tea and  occ caffeine cans of soda daily.       He has pets 2 dogs  that do sleep in bed.       Hx of tobacco use: He  reports that he quit smoking about 4 years ago. His smoking use included cigarettes. He started smoking about 24 years ago. He has a 20 pack-year smoking history. He has quit using smokeless tobacco.    Past Medical History:    Asthma (HCC)    Concussion    LOC for 1 hour    Current every day smoker    GERD (gastroesophageal reflux disease)    EGD 2014 +Duodenitis, by Dr. Herman     Hyperlipidemia    Hypogonadism male    low testosterone. MRI pituitary normal. symptomatic    Hypothyroidism    Kidney  Message left for dietitian consult. stone    Moderate persistent asthma (HCC)    Obesity, Class II, BMI 35-39.9, with comorbidity    YO on CPAP    Pneumonia    Tongue biting    stitches      Past Surgical History:   Procedure Laterality Date    Myringotomy, laser-assisted Bilateral infant , age 8     Allergies[1]  Current Outpatient Medications   Medication Sig Dispense Refill    ALBUTEROL 108 (90 Base) MCG/ACT Inhalation Aero Soln INHALE 2 PUFFS INTO THE LUNGS EVERY 4 HOURS AS NEEDED FOR WHEEZING OR SHORTNESS OF BREATH. 6.7 each 2    levothyroxine 25 MCG Oral Tab TAKE 1 TABLET BY MOUTH BEFORE BREAKFAST. 90 tablet 1    montelukast 10 MG Oral Tab TAKE 1 TABLET BY MOUTH EVERY DAY AT NIGHT 90 tablet 2    levothyroxine 200 MCG Oral Tab Take 1 tablet (200 mcg total) by mouth before breakfast. 90 tablet 1    rosuvastatin 10 MG Oral Tab Take 1 tablet (10 mg total) by mouth nightly. 90 tablet 3    Tadalafil 20 MG Oral Tab Take 1 tablet (20 mg total) by mouth daily as needed for Erectile Dysfunction. 30 tablet 2    methylPREDNISolone (MEDROL) 4 MG Oral Tablet Therapy Pack Dosepack: take as directed 21 tablet 0    Nebulizer Does not apply Misc Use every 4 hours with albuterol as needed. Disp #1  DX J45.40 1 each 0    fluticasone furoate-vilanterol (BREO ELLIPTA) 200-25 MCG/ACT Inhalation Aerosol Powder, Breath Activated Inhale 1 puff into the lungs daily. (Patient not taking: Reported on 3/31/2025) 84 each 3    albuterol 108 (90 Base) MCG/ACT Inhalation Aero Soln Inhale 2 puffs into the lungs every 4 (four) hours as needed for Wheezing. 1 each 0      Social History     Socioeconomic History    Marital status:      Spouse name: Susan    Number of children: 1   Occupational History    Occupation: [employer]     Comment: heavy lifting, manual labor   Tobacco Use    Smoking status: Former     Current packs/day: 0.00     Average packs/day: 1 pack/day for 20.0 years (20.0 ttl pk-yrs)     Types: Cigarettes     Start date: 1/1/2001     Quit date: 1/1/2021      Years since quittin.2    Smokeless tobacco: Former    Tobacco comments:     Quit again   Vaping Use    Vaping status: Never Used   Substance and Sexual Activity    Alcohol use: Yes     Comment: Social    Drug use: No    Sexual activity: Yes     Partners: Female   Other Topics Concern    Caffeine Concern No    Occupational Exposure Yes     Comment: High exposure to dust and particulate at work. Doesn't use mask.    Stress Concern Yes    Weight Concern Yes    Exercise No    Reaction to local anesthetic No      Immunization History   Administered Date(s) Administered    >=3 YRS FLUZONE OR FLUARIX QUAD PRESERVE FREE SINGLE DOSE (69335) FLU CLINIC 10/21/2015    Covid-19 Vaccine Moderna 100 mcg/0.5 ml 2021, 2021    FLULAVAL 6 months & older 0.5 ml Prefilled syringe (15331) 2017, 10/08/2018, 2020, 2020, 10/11/2021    FLUZONE 6 months and older PFS 0.5 ml (84765) 2014, 10/21/2015, 2016    Influenza 2012    Pneumococcal Conjugate PCV20 2023    Pneumovax 23 10/21/2015    Positive Measles Titer 2017    Positive Mumps Titer 2017    Positive Rubella Titer 2017    Positive Varicella Titer 2017    TDAP 2014   Pended Date(s) Pended    TDAP 2023      Family History   Problem Relation Age of Onset    High Cholesterol Father     Heart Disease Father     Heart Surgery Father 58        Aortic aneursym, valve replacement    Diabetes Mother     Thyroid Disorder Mother     Melanoma Maternal Grandmother     Heart Disease Maternal Grandfather     Other (Aneurysm) Paternal Aunt         Cause of death    Asthma Sister     No Known Problems Brother     No Known Problems Daughter     No Known Problems Brother     No Known Problems Brother     No Known Problems Brother         Review of Systems   Constitutional:  Negative for fatigue, fever and unexpected weight change.   HENT:  Negative for congestion, mouth sores, nosebleeds, postnasal drip,  rhinorrhea, sore throat and trouble swallowing.    Eyes:  Negative for visual disturbance.   Respiratory:  Negative for apnea, cough, choking, chest tightness, shortness of breath and wheezing.    Cardiovascular:  Negative for chest pain, palpitations and leg swelling.   Gastrointestinal:  Negative for abdominal pain, constipation, diarrhea, nausea and vomiting.   Genitourinary:  Negative for difficulty urinating.   Musculoskeletal:  Negative for arthralgias, back pain, gait problem and myalgias.   Neurological:  Negative for dizziness, weakness and headaches.   Psychiatric/Behavioral:  Positive for sleep disturbance.         Vitals:    03/31/25 1217   BP: 142/78   Pulse: 78   Resp: 18      SpO2: 97 %  Ht Readings from Last 1 Encounters:   03/31/25 6' 1\" (1.854 m)     Wt Readings from Last 1 Encounters:   03/31/25 294 lb (133.4 kg)     Body mass index is 38.79 kg/m².     Physical Exam  Constitutional:       General: He is not in acute distress.     Appearance: Normal appearance. He is obese. He is not ill-appearing or diaphoretic.   HENT:      Head: Normocephalic and atraumatic.      Nose: Nose normal. No congestion or rhinorrhea.      Comments: Narrow nares with edematous nares      Mouth/Throat:      Mouth: Mucous membranes are moist.      Pharynx: Oropharynx is clear. No oropharyngeal exudate or posterior oropharyngeal erythema.      Comments: Mallampati class III palate   Eyes:      Extraocular Movements: Extraocular movements intact.      Pupils: Pupils are equal, round, and reactive to light.   Cardiovascular:      Rate and Rhythm: Normal rate.      Pulses: Normal pulses.      Heart sounds: Normal heart sounds. No murmur heard.  Pulmonary:      Effort: Pulmonary effort is normal. No respiratory distress.      Breath sounds: Normal breath sounds. No wheezing or rhonchi.      Comments: Diminished breath sounds bilaterally with prolonged expiratory phase  Chest:      Chest wall: No tenderness.   Abdominal:       General: Abdomen is flat. Bowel sounds are normal.      Palpations: Abdomen is soft.   Musculoskeletal:         General: Normal range of motion.   Skin:     General: Skin is warm.   Neurological:      General: No focal deficit present.      Mental Status: He is alert and oriented to person, place, and time.   Psychiatric:         Mood and Affect: Mood normal.         Behavior: Behavior normal.         Thought Content: Thought content normal.         Judgment: Judgment normal.             Labs:  Last BMP  Lab Results   Component Value Date     (H) 08/24/2024    BUN 19 08/24/2024    CREATSERUM 1.02 08/24/2024    BUNCREA 12.0 10/27/2021    ANIONGAP 2 08/24/2024    GFRAA 111 02/14/2022    GFRNAA 96 02/14/2022    CA 9.5 08/24/2024     08/24/2024    K 4.4 08/24/2024     08/24/2024    CO2 30.0 08/24/2024    OSMOCALC 295 08/24/2024      Last CBC  Lab Results   Component Value Date    WBC 9.1 08/24/2024    RBC 5.54 08/24/2024    HGB 16.7 08/24/2024    HCT 47.6 08/24/2024    MCV 85.9 08/24/2024    MCH 30.1 08/24/2024    MCHC 35.1 08/24/2024    RDW 13.3 08/24/2024    .0 08/24/2024    MPV 9.8 12/26/2012      Last CMP  Lab Results   Component Value Date     (H) 08/24/2024    BUN 19 08/24/2024    BUNCREA 12.0 10/27/2021    CREATSERUM 1.02 08/24/2024    ANIONGAP 2 08/24/2024    GFR 98 11/04/2017    GFRNAA 96 02/14/2022    GFRAA 111 02/14/2022    CA 9.5 08/24/2024    OSMOCALC 295 08/24/2024    ALKPHO 85 08/24/2024    AST 19 08/24/2024    ALT 24 08/24/2024    BILT 0.6 08/24/2024    TP 7.3 08/24/2024    ALB 4.8 08/24/2024    GLOBULIN 2.5 08/24/2024    AGRATIO 1.7 11/13/2015     08/24/2024    K 4.4 08/24/2024     08/24/2024    CO2 30.0 08/24/2024      Last Thyroid Function  Lab Results   Component Value Date    T4F 1.0 08/24/2024    TSH 1.007 03/15/2025        Imaging:  No results found.  PROCEDURE:  XR CHEST PA + LAT CHEST (PTT=[id number]) personally reviewed     INDICATIONS:  congestion, runny  nose, fever, chills, headaches, fatigue     COMPARISON:  None.     TECHNIQUE:  PA and lateral chest radiographs were obtained.     PATIENT STATED HISTORY: (As transcribed by Technologist)  Pt c/o nasal congestion and cough x3-4 days.         FINDINGS:  The heart is normal in size.  Subtle bronchial wall thickening is present.  No signs of bronchial dilation or obvious bronchiectasis.  No focal consolidation is seen.  The costophrenic angles are sharp.  No sign of pneumothorax.                   Impression   CONCLUSION:  Nonspecific subtle bronchial wall thickening, but consider subtle bronchitis.          LOCATION:  [facility]        Dictated by (CST): Luigi Allen MD on 1/02/2025 at 8:32 AM        Asthma Control Test:   (In The Last 4 Weeks)     Asthma Control Test Score: 19 points out of 25 points      14 or less  Asthma is very poorly controlled    15-19  Asthma is not as controlled as it could be   20-25  Asthma is under control    [facility]       Accredited by the American Academy of Sleep Medicine (AASM)     PATIENT'S NAME:        KAYLYN ZIMMERMAN  REFERRING PHYSICIAN:  CONSULTING PHYSICIAN:  Gab Cooper D.O.  PATIENT ACCOUNT #:     [id number]       LOCATION:       [facility]  MEDICAL RECORD #:      [id number]         YOB: 1975  DATE OF STUDY:         10/13/2012                               SLEEP STUDY REPORT        STUDY TYPE:  Polysomnogram.     REFERRING PHYSICIAN:  Dr. Perry Mckeon .     HISTORY:  This is a 36-year-old gentleman, body mass index of 35.7,  weight of 269 pounds.  According to the pre-sleep questionnaire,  there are complaints of witnessed apneas and feeling tired during the  day.  The patient goes to bed at 9:30 p.m. and wakes up at 4:30 a.m.  during the week.  There is a history of snoring and witnessed apneas.   Five Points Scale Score is normal at 6 of 24.     MEDICATIONS:  Include thyroid replacement, Singulair, and Nasonex.     DIAGNOSTIC RECORDING PARAMETERS::   The patient underwent a formal  polysomnographic evaluation at the [facility]. The study was  acquired in compliance with the AASM Manual for the Scoring of Sleep  and Associated Events. The following parameters were monitored: EOG,  EEG, ECG, chin EMG, left and right tibialis EMG, snore microphone, an  oronasal thermal sensor, nasal pressure transducer, respiratory  inductance plethysmography, and oxygen saturation via continuous  pulse oximetry. Body position is documented via technician notes  every 15 minutes.     FINDINGS:  Lights out at 10:05 p.m., lights on at 5:18 a.m.  Total  sleep time 411 minutes, giving a normal sleep efficiency of 95%.  Sleep latency was immediate.  REM latency normal at 77 minutes.     SLEEP ARCHITECTURE:  Generally unremarkable except for a mild  increase in stage N1 sleep at 12% and a mild decrease in stage N3  sleep at 14%.     The patient demonstrated a total of 442 apneas and hypopneas, 114 of  which were either central or mixed apneas, giving an apnea-hypopnea  index of 64 events per hour of sleep.  Supine AHI of 73, non-supine  AHI of 34.  Sleep disordered breathing was very severe both in REM  and non-REM sleep.     OXYGENATION:  Oxygen desaturation benja was 60%, occurring during  supine REM sleep.  There were deep oxygen desaturations occurring  during supine REM sleep associated with obstructive apneas and  hypopneas.     PERIODIC LIMB MOVEMENTS:  Not observed.        PATIENT NAME: KAYLYN ZIMMERMAN                    ACCOUNT #: [id number]                                              MEDICAL RECORD #: [id number]                                                                        EKG/EEG:  Showed no significant abnormalities.     IMPRESSION:  Severe obstructive sleep apnea, AHI of 64, SaO2 60%  occurring during REM sleep.  There were deep desaturations seen  during REM sleep.     DIAGNOSIS:  Obstructive sleep apnea 327.23.     RECOMMENDATIONS:  1.    The patient appears  to be an appropriate candidate for CPAP  therapy, and a CPAP titration study is recommended.  2.    Alcohol and sedatives should be avoided as they can worsen  snoring and sleep disordered breathing.  3.    Examination of the upper airway for any obstructing lesions of  the nose, nasopharynx, or oropharynx is indicated.  4.    The patient should be cautioned against driving during periods  of somnolence.     Thank you for your support of the [facility].     Dictated By Gab Cooper D.O.  d:    10/15/2012 15:22:35      [place] Sleepiness Scale: (ESS) score on today's visit is  0  out of 24.     Score total of 1-6    Normal sleep   Score total of 7-8    Average sleepiness   Score total of 9-24    Abnormal (possibly pathologic) sleepiness       Impression:  Obstructive sleep apnea syndrome (OSAS): Attended sleep study performed on 10/13/2012 showed Total of 442 apneas and hypopneas, 114 of which were either central or mixed apneas, giving an apnea-hypopnea index of 64 events per hour of sleep.  Supine AHI of 73, non-supine AHI of 34.  Sleep disordered breathing was very severe both in REM and non-REM sleep.Treated with auto CPAP at 12-20 cwp. The CPAP Unit is over 9 years old and patient has no supplies. Download data on 3/30/2025  for 90  days shows adequate AHI and compliance    Daytime hypersomnolence/fatigue  Obesity: Class II ;  Body mass index is 38.79 kg/m².  Allergic Rhinitis  Asthma, mild to moderate persistent with complaints of chest tightness and shortness of breath, suspect related to asthma exacerbation  History of smoking at least 20 pack years of tobacco but stopped smoking in 2021  GERD  Hyperlipidemia  Hypothyroidism                                Plan:      Initiate auto-titrating CPAP at 12-20 cwp at bedtime  Advise [employer] company to perform a Mask Fit   Then Obtain Download data for compliance and efficacy from CPAP   Azelastine 137 mcg 1 puff twice daily as needed for congested and runny  nose   Flonase 2 puffs each nostril daily  Normal saline OTC nasal spray 2 puffs 4 times daily as needed to prevent nasal dryness   Prednisone 20 mg oral daily for 5 days   Advair HFA /21 one puff twice daily   Albuterol HFA  MDI 2 puffs 4 times daily as needed   Continue Montelukast 10 mg oral daily   Advised about weight loss   Advised against drowsy driving and to avoid alcoholic beverage and respiratory depressants as these may worsen sleep apnea      Follow up: 4  months     Thank you for allowing me to participate in your patient care.    ORLANDO Johnston MD, FACP, FCCP, [facility] - Pulmonary/Critical care/Sleep Medicine  Please contact our office if you have any questions or concerns at [phone]    Note to the patient: The 21st Century Cures Act makes medical notes like these available to patients in the interest of transparency. However, be advised that this is a medical document. It is intended as peer to peer communication. It is written in medical language and may contain abbreviations or verbiage that are unfamiliar. It may appear blunt or direct. Medical documents are intended to carry relevant information, facts as evident, and clinical opinion of the practitioner.      Disclaimer: Components of this note were documented using voice recognition system and are subject to errors not corrected at proofreading. Contact the author of this note for any clarifications         [1]   Allergies  Allergen Reactions    Dust     Omeprazole OTHER (SEE COMMENTS)     Wife states pt had pancreatitis    Pollen

## 2025-03-31 NOTE — PROGRESS NOTES
CelesteJose  2024 - 2025  Patient ID: [id number]  : 1977  Age: 47 years  [facility] [address]  [place]  [place], [address]  Phone: [phone]  Email: [email or web address]  Compliance Report  Compliance  Payor Standard  Usage 2024 - 2025  Usage days 90/90 days (100%)  >= 4 hours 90 days (100%)  < 4 hours 0 days (0%)  Usage hours 687 hours 3 minutes  Average usage (total days) 7 hours 38 minutes  Average usage (days used) 7 hours 38 minutes  Median usage (days used) 7 hours 26 minutes  Total used hours (value since last reset - 2025) 11,008 hours  AirSense 10 AutoSet  Serial number [id number]  Mode AutoSet  Min Pressure 12 cmH2O  Max Pressure 20 cmH2O  EPR Fulltime  EPR level 2  Response Standard  Therapy  Pressure - cmH2O Median: 12.8 95th percentile: 15.0 Maximum: 16.4  Leaks - L/min Median: 42.5 95th percentile: 88.5 Maximum: 102.9  Events per hour AI: 2.2 HI: 1.0 AHI: 3.2  Apnea Index Central: 0.1 Obstructive: 0.0 Unknown: 2.1  RERA Index 1.2  Cheyne-Moore respiration (average duration per night) 0 minutes (0%)

## 2025-04-01 ENCOUNTER — TELEPHONE (OUTPATIENT)
Facility: CLINIC | Age: 50
End: 2025-04-01

## 2025-04-01 NOTE — TELEPHONE ENCOUNTER
Staff message from 03/31/2025  Alexy Johnston MD Lyon, Christina, DO; [facility]  · Initiate auto-titrating CPAP at 12-20 cwp at bedtime  · Advise DME company to perform a Mask Fit  · Then Obtain Download data for compliance and efficacy from CPAP    Detailed [employer] message sent to pt, pap device ordered to [facility] via [place].  DME will verify insurance and once approved will contact pt to arrange delivery date/time.  Per [employer] message, pt instructed to follow up with Dr. Johnston once he/she starts using the device, per their insurance compliance requirement.  Provided call back numbers.    [phone] (home)

## 2025-04-04 NOTE — ASSESSMENT & PLAN NOTE
Chronic, stable; Last TSH appropriate  - Refill of Levothyroxine 225mcg   - Annual TSH   Orders:    Levothyroxine Sodium; Take 1 tablet (200 mcg total) by mouth before breakfast.  Dispense: 90 tablet; Refill: 1

## 2025-04-04 NOTE — ASSESSMENT & PLAN NOTE
Chronic, Stable,  no recent exacerbations/hospitalizations. Managed with current inhalers.  Does want to address with new pulm. -   - Refill Singulair.   - Commended on tobacco cessation  Orders:    Montelukast Sodium; TAKE 1 TABLET BY MOUTH EVERY DAY AT NIGHT  Dispense: 90 tablet; Refill: 2

## 2025-04-04 NOTE — ASSESSMENT & PLAN NOTE
Orders:    Montelukast Sodium; TAKE 1 TABLET BY MOUTH EVERY DAY AT NIGHT  Dispense: 90 tablet; Refill: 2

## 2025-04-04 NOTE — ASSESSMENT & PLAN NOTE
Currently on Rosuvastatin, last lipids 8/2024.  Would benefit from recheck.   Coronary Ca  - ordered 6/2024- not completed.    Orders:    Rosuvastatin Calcium; Take 1 tablet (10 mg total) by mouth nightly.  Dispense: 90 tablet; Refill: 3

## 2025-04-04 NOTE — ASSESSMENT & PLAN NOTE
YO diagnosed 13 years ago with severe apnea (442 apneas per hour) requiring CPAP therapy. Current AHI is 2.9, indicating controlled symptoms with CPAP. He experiences significant daytime fatigue and apnea without CPAP. Insurance requires an in-lab sleep study to authorize CPAP supplies.  - Order in-lab sleep study to meet insurance requirements for CPAP supplies.  - Coordinate with [place] from pulmonology office to schedule follow-up within three days post-sleep study.  - Ensure documentation of symptoms without CPAP for insurance purposes.  - Provide printed information regarding the sleep study process and follow-up steps.  Orders:    Titration Sleep Study    General sleep study; Future; Expected date: 04/17/2025

## 2025-05-14 ENCOUNTER — APPOINTMENT (OUTPATIENT)
Dept: GENERAL RADIOLOGY | Age: 50
End: 2025-05-14
Attending: Physician Assistant
Payer: COMMERCIAL

## 2025-05-14 ENCOUNTER — HOSPITAL ENCOUNTER (OUTPATIENT)
Age: 50
Discharge: HOME | End: 2025-05-14
Payer: COMMERCIAL

## 2025-05-14 VITALS
HEIGHT: 73 IN | RESPIRATION RATE: 22 BRPM | BODY MASS INDEX: 37.77 KG/M2 | SYSTOLIC BLOOD PRESSURE: 143 MMHG | OXYGEN SATURATION: 96 % | WEIGHT: 285 LBS | TEMPERATURE: 98 F | DIASTOLIC BLOOD PRESSURE: 73 MMHG | HEART RATE: 70 BPM

## 2025-05-14 DIAGNOSIS — J01.90 ACUTE SINUSITIS, RECURRENCE NOT SPECIFIED, UNSPECIFIED LOCATION: Primary | ICD-10-CM

## 2025-05-14 DIAGNOSIS — J45.40 MODERATE PERSISTENT ASTHMA WITHOUT COMPLICATION (HCC): ICD-10-CM

## 2025-05-14 PROCEDURE — 99213 OFFICE O/P EST LOW 20 MIN: CPT | Performed by: PHYSICIAN ASSISTANT

## 2025-05-14 PROCEDURE — 71046 X-RAY EXAM CHEST 2 VIEWS: CPT | Performed by: PHYSICIAN ASSISTANT

## 2025-05-14 RX ORDER — BENZONATATE 200 MG/1
200 CAPSULE ORAL 3 TIMES DAILY PRN
Qty: 20 CAPSULE | Refills: 0 | Status: SHIPPED | OUTPATIENT
Start: 2025-05-14

## 2025-05-14 NOTE — ED PROVIDER NOTES
Chief Complaint   Patient presents with    Cough/URI    Sore Throat       History obtained from: patient   services not used     HPI:     Jose Alonso is a 49 year old male who presents with URI symptoms x 10 days. Patient complains of sore throat, nasal congestion, bilateral ear pressure, and productive cough. Patient notes some shortness of breath with coughing. Patient taking Robitussin and chloraseptic for symptoms.  Denies fever, chest pain, shortness of breath at rest, wheezing, abdominal pain, vomiting, headache, neck stiffness, rash.    PMH  Past Medical History[1]    PFSH    PFSH asessment screens reviewed and agree.  Nurses notes reviewed I agree with documentation.    Family History[2]  Family history reviewed with patient/caregiver and is not pertinent to presenting problem.  Social History     Socioeconomic History    Marital status:      Spouse name: Susan    Number of children: 1    Years of education: Not on file    Highest education level: Not on file   Occupational History    Occupation: [employer]     Comment: heavy lifting, manual labor   Tobacco Use    Smoking status: Former     Current packs/day: 0.00     Average packs/day: 1 pack/day for 20.0 years (20.0 ttl pk-yrs)     Types: Cigarettes     Start date: 2001     Quit date: 2021     Years since quittin.3    Smokeless tobacco: Former    Tobacco comments:     Quit again   Vaping Use    Vaping status: Never Used   Substance and Sexual Activity    Alcohol use: Yes     Comment: Social    Drug use: No    Sexual activity: Yes     Partners: Female   Other Topics Concern     Service Not Asked    Blood Transfusions Not Asked    Caffeine Concern No    Occupational Exposure Yes     Comment: High exposure to dust and particulate at work. Doesn't use mask.    Hobby Hazards Not Asked    Sleep Concern Not Asked    Stress Concern Yes    Weight Concern Yes    Special Diet Not Asked    Back Care Not Asked    Exercise No     Bike Helmet Not Asked    Seat Belt Not Asked    Self-Exams Not Asked    Grew up on a farm Not Asked    History of tanning Not Asked    Outdoor occupation Not Asked    Reaction to local anesthetic No   Social History Narrative    Not on file     Social Drivers of Health     Food Insecurity: Not on file   Transportation Needs: Not on file   Housing Stability: Not on file         ROS:   Positive for stated complaint: Sore throat, congestion, ear pressure, productive cough  Other systems are as noted in HPI.   All other systems reviewed and negative except as noted above.    Physical Exam:   Vital signs and nursing note reviewed.       /73   Pulse 70   Temp 97.7 °F (36.5 °C) (Oral)   Resp 22   Ht 185.4 cm (6' 1\")   Wt 129.3 kg   SpO2 96%   BMI 37.60 kg/m²     GENERAL: well developed, no acute distress, non-toxic appearing   SKIN: good skin turgor, no obvious rashes  HEAD: normocephalic, atraumatic  EYES: sclera non-icteric bilaterally, conjunctiva clear bilaterally  EARS: canals clear bilaterally, TMs clear bilaterally  NOSE: nasal congestion  OROPHARYNX: MMM, pharynx clear, no exudates or swelling, uvula midline, no tongue elevation, maintaining airway and secretions  NECK: supple, no lymphadenopathy, no nuchal rigidity, no trismus, no edema, phonation normal    CARDIO: RRR, normal heart sounds   LUNGS: clear to auscultation bilaterally, no increased WOB, no rales, rhonchi, or wheezes  EXTREMITIES: no cyanosis or edema, HERRERA without difficulty  NEURO: no focal deficits  PSYCH: alert and oriented x3, answering questions appropriately, mood appropriate    MDM/Assessment/Plan:   Orders for this encounter:    Orders Placed This Encounter    XR CHEST PA + LAT CHEST (CPT=71046)     What is the Relevant Clinical Indication / Reason for Exam?:   productive cough x 10 days     Release to patient:   Immediate    amoxicillin clavulanate 875-125 MG Oral Tab     Sig: Take 1 tablet by mouth 2 (two) times daily for 7  days.     Dispense:  14 tablet     Refill:  0    benzonatate 200 MG Oral Cap     Sig: Take 1 capsule (200 mg total) by mouth 3 (three) times daily as needed for cough.     Dispense:  20 capsule     Refill:  0       Labs performed this visit:  No results found for this or any previous visit (from the past 10 hours).    Imaging performed this visit:  XR CHEST PA + LAT CHEST (CPT=71046)   Final Result   PROCEDURE:  XR CHEST PA + LAT CHEST (CPT=71046)       INDICATIONS:  productive cough x 10 days       COMPARISON:  [facility], XR, XR CHEST PA + LAT CHEST (QYM=82560),    1/02/2025, 8:23 AM.       TECHNIQUE:  PA and lateral chest radiographs were obtained.       PATIENT STATED HISTORY: (As transcribed by Technologist)  Productive    cough, SOB, and congestion for 10 days.            FINDINGS:  The cardiomediastinal silhouette is within normal limits.     There is no consolidation, effusion, or pneumothorax.  No aggressive    osseous lesions are identified.                         =====   CONCLUSION: No acute cardiopulmonary abnormality.           LOCATION:  [facility]           Dictated by (CST): Yannick Cutler MD on 5/14/2025 at 8:37 AM        Finalized by (CST): Yannick Cutler MD on 5/14/2025 at 8:37 AM             Medical Decision Making  DDx includes sinusitis versus viral URI versus bronchitis versus pneumonia versus other.  Patient is overall well-appearing with stable vitals and tolerating oral intake.  No hypoxia or signs of respiratory distress.  Chest x-ray independently reviewed, no pneumonia.  Discussed possible etiologies of symptoms including viral vs bacterial etiology. Given constellation and chronicity of symptoms without improvement, shared decision making employed to treat for uncomplicated acute bacterial rhinosinusitis. Rx Augmentin.  Rx Tessalon for cough as needed.  Discussed supportive care including rest, increased water intake, OTC Tylenol/Motrin as needed for pain or fevers, and using a  humidifier.  Instructed patient to go directly to nearest ER with any worsening or concerning symptoms.  Follow-up with PCP.    Amount and/or Complexity of Data Reviewed  Radiology: ordered and independent interpretation performed.    Risk  OTC drugs.  Prescription drug management.        Diagnosis:    ICD-10-CM    1. Acute sinusitis, recurrence not specified, unspecified location  J01.90           All results reviewed and discussed with patient/patient's family. Patient/patient's family verbalize excellent understanding of instructions and feels comfortable with plan. All of patient's/patient's family's questions were addressed.   See AVS for detailed discharge instructions for your condition today.    Follow Up with:  Joselin Arreola DO  [address] [facility] [address]  [facility] [address]  [phone]            Note: This document was dictated using Dragon medical dictation software.  Proofreading was performed to the best of my ability, but errors may be present.    Gaviota Sarmiento PA-C         [1]   Past Medical History:   Asthma (HCC)    Concussion    LOC for 1 hour    Current every day smoker    GERD (gastroesophageal reflux disease)    EGD 2014 +Duodenitis, by Dr. Herman     Hyperlipidemia    Hypogonadism male    low testosterone. MRI pituitary normal. symptomatic    Hypothyroidism    Kidney stone    Moderate persistent asthma (HCC)    Obesity, Class II, BMI 35-39.9, with comorbidity    YO on CPAP    Pneumonia    Tongue biting    stitches   [2]   Family History  Problem Relation Age of Onset    High Cholesterol Father     Heart Disease Father     Heart Surgery Father 58        Aortic aneursym, valve replacement    Diabetes Mother     Thyroid Disorder Mother     Melanoma Maternal Grandmother     Heart Disease Maternal Grandfather     Other (Aneurysm) Paternal Aunt         Cause of death    Asthma Sister     No Known Problems Brother     No Known Problems Daughter     No Known Problems Brother     No Known  Problems Brother     No Known Problems Brother

## 2025-05-14 NOTE — TELEPHONE ENCOUNTER
Please review: medication fails/has no protocol attached.    No future appointments with primary care medicine.

## 2025-05-14 NOTE — DISCHARGE INSTRUCTIONS
Complete entire course of antibiotic for sinus infection as directed   Benzonatate as needed for cough up to 3 times daily   Drink plenty of water and get plenty of rest   You may benefit from taking a decongestant (e.g. Sudafed) and nasal spray (e.g. Flonase)  You may benefit from taking a daily allergy medication (e.g. Zyrtec)  You may benefit from using a humidifier    Alternate Tylenol (acetaminophen) and Advil (ibuprofen) every 3 hours for pain or fever > 100.4 degrees    Sleep with head elevated and avoid laying flat  Avoid having air blow on your face    Wash hands often  Disinfect your environment  Do not share utensils or drinks    Symptoms may take a few weeks to resolve  Follow up with your primary care provider

## 2025-05-15 RX ORDER — ALBUTEROL SULFATE 90 UG/1
2 INHALANT RESPIRATORY (INHALATION) EVERY 4 HOURS PRN
Qty: 1 EACH | Refills: 2 | Status: SHIPPED | OUTPATIENT
Start: 2025-05-15

## 2025-05-16 ENCOUNTER — TELEPHONE (OUTPATIENT)
Facility: CLINIC | Age: 50
End: 2025-05-16

## 2025-05-16 DIAGNOSIS — G47.33 OSA ON CPAP: Primary | ICD-10-CM

## 2025-05-16 NOTE — TELEPHONE ENCOUNTER
Nurse entered referral for cpap & supplies. Referral# [id number]. Message sent to [facility] Referral Department for review.  Nurse informed patient referral has been initiated.  Pt verbalize understanding.    [phone] (home)

## 2025-05-16 NOTE — TELEPHONE ENCOUNTER
Pt called with c/o not receiving his cpap device and supplies.  Pt states [employer] is waiting for records from office.  Nurse contacted [facility], informed customer service of pt's concerns, per [facility], nurse needs to fax over most recent office visit.  Nurse informed , most recent office visit were faxed over on 04/15/2025, but will refax visit again.   will forward this message to their compliance department.    Nurse informed pt of conversation with [facility], pt requesting an update call back.       [phone] (home)

## 2025-05-19 NOTE — TELEPHONE ENCOUNTER
Approved referral for cpap device and supplies faxed to [facility] w/confirmation. Detailed mcm sent to pt.    [phone] (home)

## 2025-06-10 ENCOUNTER — PATIENT MESSAGE (OUTPATIENT)
Dept: FAMILY MEDICINE CLINIC | Facility: CLINIC | Age: 50
End: 2025-06-10

## 2025-06-10 DIAGNOSIS — L91.8 MULTIPLE ACQUIRED SKIN TAGS: Primary | ICD-10-CM

## 2025-06-11 NOTE — TELEPHONE ENCOUNTER
Triage call transferred.   Spoke with pt's wife, pt requesting to be seen for skin tag removal any time next week prior to leaving on fishing trip. Pt willing to be flexible.   In the mean time, scheduled OV as per pt's availability:  Future Appointments   Date Time Provider Department Center   6/30/2025  4:20 PM Joselin Arreola DO [facility]     Pt added to wait list.  Informed will relay to PCP sooner appt request next week.  Ok to respond to mychart with date/time.  Wife verbalized understanding and agreed with POC.

## 2025-06-12 NOTE — TELEPHONE ENCOUNTER
Generally I like to be able to see the skin tags before removing in case they are in an area that I dont feel comfortable doing?   Okay to place referral if he would prefer that (Florencia)

## 2025-08-08 DIAGNOSIS — J45.40 MODERATE PERSISTENT ASTHMA WITHOUT COMPLICATION (HCC): ICD-10-CM

## 2025-08-11 RX ORDER — ALBUTEROL SULFATE 90 UG/1
2 INHALANT RESPIRATORY (INHALATION) EVERY 4 HOURS PRN
Qty: 6.7 EACH | Refills: 2 | Status: SHIPPED | OUTPATIENT
Start: 2025-08-11

## (undated) DIAGNOSIS — E03.9 HYPOTHYROIDISM IN ADULT: ICD-10-CM

## (undated) DIAGNOSIS — J30.89 ENVIRONMENTAL AND SEASONAL ALLERGIES: ICD-10-CM

## (undated) DIAGNOSIS — J45.40 MODERATE PERSISTENT ASTHMA WITHOUT COMPLICATION: ICD-10-CM

## (undated) DIAGNOSIS — E53.8 VITAMIN B 12 DEFICIENCY: Primary | ICD-10-CM

## (undated) DIAGNOSIS — D72.819 LEUKOPENIA, UNSPECIFIED TYPE: Primary | ICD-10-CM

## (undated) NOTE — LETTER
ASTHMA ACTION PLAN for Rashaad Franco     : 1975     Date:2021  Provider:  Tl Driscoll DO  Phone for doctor or clinic: [address] [facility], [facility] [place]  [phone]    ACT Score: 20    You can use t [] Asthma Action Plan reviewed with patient (and caregiver if necessary) on the phone and mailed copy to patient or submitted via [address]      Signatures:  Provider  Sadia Diallo DO   Patient Caretaker

## (undated) NOTE — LETTER
ASTHMA ACTION PLAN for Sage Jenkins     : 1975     Date: 2017  Provider:  Jersey Garcia DO  Phone for doctor or clinic: [address] [facility] [address] [phone]    ACT Score: 24      You can us the phone and mailed copy to patient or submitted via [address]      Signatures:  Provider  Scott Wray DO   Patient Caretaker

## (undated) NOTE — LETTER
11/07/18        [address] [place] [address] [address]      Dear José Miguel Resides,    [address] [facility] records indicate that you have outstanding lab work and or testing that was ordered for you and has not yet been completed:  Orders Placed This Encounter      X

## (undated) NOTE — LETTER
10/25/17        [address]      Dear Chris Daugherty records indicate that you have outstanding lab work and or testing that was ordered for you and has not yet been completed:    CBC With Diff  CMP  Lipid

## (undated) NOTE — LETTER
07/24/20        [address] [address]      Dear Kenny Tobias,    [address] [facility] records indicate that you have outstanding lab work and or testing that was ordered for you and has not yet been completed:  Orders Placed This Encounter

## (undated) NOTE — LETTER
Date: 10/8/2018    Patient Name: Ann Wilson          To Whom it may concern: The above patient was seen at the [facility] for treatment of a medical condition.     The patient may return to work as scheduled with the following limitation

## (undated) NOTE — LETTER
02/12/18        [address] [facility] [address]      Dear [facility],    [address] [facility] records indicate that you have outstanding lab work and or testing that was ordered for you and has not yet been completed:        CMP      LIPID PANEL  To prov

## (undated) NOTE — LETTER
12/10/20        Sridhar Heading  [address] [place] [address]      Dear Marin Gauthier,    [address] [facility] records indicate that you have outstanding lab work and or testing that was ordered for you and has not yet been completed:      XR SHOULDER, COMPLETE (MIN

## (undated) NOTE — Clinical Note
· Initiate auto-titrating CPAP at 12-20 cwp at bedtime · Advise DME company to perform a Mask Fit  · Then Obtain Download data for compliance and efficacy from CPAP

## (undated) NOTE — LETTER
ASTHMA ACTION PLAN for Hugo Cameron     : 1975     Date: 2020  Provider:  Scott Wray DO  Phone for doctor or clinic: [address] [facility], [facility] [place]  [phone]    ACT Score: 23    You can use t [] Asthma Action Plan reviewed with patient (and caregiver if necessary) on the phone and mailed copy to patient or submitted via [address]      Signatures:  Provider  Rafa Olguin,    Patient Caretaker

## (undated) NOTE — LETTER
January 8, 2019        Mack Goodell  [address] [place]      Dear Mirza Salguero: We have attempted to contact you by phone with no success.  In an effort to provide quality patient care we are reaching out to you to contact the office at you

## (undated) NOTE — LETTER
Date & Time: 9/7/2023, 4:46 PM  Patient: Karyna Mac  Encounter Provider(s):    ZACH Rivas       To Whom It May Concern:    Anita Back was seen and treated in our department on 9/7/2023. He should not return to work until 09/11/2023 . If you have any questions or concerns, please do not hesitate to call.         Radha SERRANO   Nurse Practitioner    This note has been electronically signed

## (undated) NOTE — LETTER
05/01/19        [address] [place] [address] [place] [address]      Dear Earnestine Simmons,    [address] [facility] records indicate that you have outstanding lab work that was ordered for you and has not yet been completed:  Orders Placed This Encounter      CBC With Quirino Mulugeta

## (undated) NOTE — LETTER
ASTHMA ACTION PLAN for Mariam Tan     : 1975     Date: 2023  Provider:  Alicia Mujica DO  Phone for doctor or clinic: [facility], [address], [address] [place]  [phone]    ACT Score: 20      You can use the colors of a traffic light to help learn about your asthma medicines. 1. Green - Go! % of Personal Best Peak Flow Use controller medicine. Breathing is good  No cough or wheeze  Can work and play Medicine How much to take When to take it    fluticasone furoate-vilanterol (BREO ELLIPTA) 200-25 MCG/INH Inhalation Aerosol Powder, Breath Activated Sig - Route: Inhale 1 puff into the lungs daily. Rinse mouth after use - Inhalation  montelukast 10 MG Oral Tab Sig: TAKE 1 TABLET BY MOUTH EVERY DAY AT NIGHT        2. Yellow - Caution. 50-79% Personal Best Peak  Flow. Use reliever medicine to keep an asthma attack from getting bad. Cough  Wheezing  Tight Chest  Wake up at night Medicine How much to take When to take it    Albuterol inhaler - Take 2 puffs into the lungs every 4 hours as needed. Additional instructions If your symptoms do not improve or require albuterol inhaler use every 4 to 6 hours, please contact our office at [phone] and ask to speak with the nurse. 3. Red - Stop! Danger!  <50% Personal Best Peak  Flow. Take these medications until  Get help from a doctor   Medicine not helping  Breathing is hard and fast  Nose opens wide  Can't walk  Ribs show  Can't talk well Medicine How much to take When to take it    Albuterol Inhaler - Take 2 puffs every 10 minutes. If no relief after 2 treatments or symptoms are worse, please go to nearest ER or call 911 immediately. Additional Instructions If your symptoms do not improve and you cannot contact your doctor, [facility] room or call 911 immediately!      [x] Asthma Action Plan reviewed with patient (and caregiver if necessary) and a copy of the plan was given to the patient/caregiver. [] Asthma Action Plan reviewed with patient (and caregiver if necessary) on the phone and mailed copy to patient or submitted via [address]      Signatures:  Provider  Mami Hannah DO   Patient Caretaker

## (undated) NOTE — LETTER
ASTHMA ACTION PLAN for Rasheed Smith     : 1975     Date: 2022  Provider:  Shannon Caban DO  Phone for doctor or clinic: [address] [facility], [address] [place]  [phone]    ACT Score: 21      You can use the colors of a traffic light to help learn about your asthma medicines. 1. Green - Go! % of Personal Best Peak Flow Use controller medicine. Breathing is good  No cough or wheeze  Can work and play Medicine How much to take When to take it    Montelukast 10 MG Oral Tab Sig: TAKE 1 TABLET BY MOUTH EVERY DAY AT NIGHT. Fluticasone-salmeterol (WIXELA INHUB) 500-50 MCG/DOSE Inhalation Aerosol Powder, Breath Activated  Sig - Route: Inhale 1 puff into the lungs 2 (two) times daily. - Inhalation        2. Yellow - Caution. 50-79% Personal Best Peak  Flow. Use reliever medicine to keep an asthma attack from getting bad. Cough  Wheezing  Tight Chest  Wake up at night Medicine How much to take When to take it    Albuterol inhaler Take 2 puffs into the lungs every 4 hours as needed. Additional instructions If your symptoms do not improve or require albuterol inhaler use every 4 to 6 hours, please contact our office at [phone] and ask to speak with the nurse. 3. Red - Stop! Danger!  <50% Personal Best Peak  Flow. Take these medications until  Get help from a doctor   Medicine not helping  Breathing is hard and fast  Nose opens wide  Can't walk  Ribs show  Can't talk well Medicine How much to take When to take it    Albuterol Inhaler Take 2 puffs every 10 minutes. If no relief after 2 treatments or symptoms are worse, please go to nearest ER or call 911 immediately. Additional Instructions If your symptoms do not improve and you cannot contact your doctor, go to the[facility] room or call 911 immediately!      [x] Asthma Action Plan reviewed with patient (and caregiver if necessary) and a copy of the plan was given to the patient/caregiver. [] Asthma Action Plan reviewed with patient (and caregiver if necessary) on the phone and mailed copy to patient or submitted via [address]      Signatures:  Provider  Jaciel Zelaya, DO   Patient Caretaker

## (undated) NOTE — LETTER
ASTHMA ACTION PLAN for Jose Alonso     : 1975     Date: 2024  Provider:  Joselin Arreola DO  Phone for doctor or clinic: [facility], [address] [place] [address] [phone]    ACT Score: 18      You can use the colors of a traffic light to help learn about your asthma medicines.      1. Green - Go! % of Personal Best Peak Flow Use controller medicine.   Breathing is good  No cough or wheeze  Can work and play Medicine How much to take When to take it    Singulair (Montelukast) 10 mg by mouth daily       2. Yellow - Caution. 50-79% Personal Best Peak  Flow.  Use reliever medicine to keep an asthma attack from getting bad.   Cough  Wheezing  Tight Chest  Wake up at night Medicine How much to take When to take it    Albuterol inhaler,  2 puffs every four hours as needed.   Proair HFA (albuterol) inhaler 1-2 puffs every 4-6 hours as needed        Additional instructions         3. Red - Stop! Danger!  <50% Personal Best Peak  Flow. Take these medications until  Get help from a doctor   Medicine not helping  Breathing is hard and fast  Nose opens wide  Can't walk  Ribs show  Can't talk well Medicine How much to take When to take it    Go to the nearest Emergency Room/Department right now!      Additional Instructions If your symptoms do not improve and you cannot contact your doctor, [facility] room or call 911 immediately!     [x] Asthma Action Plan reviewed with patient (and caregiver if necessary) and a copy of the plan was given to the patient/caregiver.   [] Asthma Action Plan reviewed with patient (and caregiver if necessary) on the phone and mailed copy to patient or submitted via [employer].     Signatures:  Provider  Joselin Arreola DO   Patient Caretaker

## (undated) NOTE — LETTER
Date & Time: 1/2/2025, 8:43 AM  Patient: Jose Alonso  Encounter Provider(s):    Petty Dexter APRN       To Whom It May Concern:    Jose Alonso was seen and treated in our department on 1/2/2025. He should not return to work until he is fever free for 24 hours and has an improvement in symptoms .    If you have any questions or concerns, please do not hesitate to call.        _____________________________  Physician/APC Signature

## (undated) NOTE — LETTER
02/24/21        [address] [address]      Dear Michelle Giles,    Our records indicate that you have outstanding lab work and or testing that was ordered for you and has not yet been completed:      CBC With Diff      CMP

## (undated) NOTE — LETTER
ASTHMA ACTION PLAN for Viola Parkinson     : 1975     Date: 2017  Provider:  Sayda Sheets DO  Phone for doctor or clinic: [address], [address] [facility]  [phone]     ACT: 25      You can use the Signatures:  Provider  Rosario Witt DO   Patient Caretaker

## (undated) NOTE — MR AVS SNAPSHOT
[facility] [place]  [address] [facility] [place] [address] [phone]               Thank you for choosing us for your health care visit with Brandy Hawk DO. We are glad to serve you and happy to provide you with this summary of your visit.   Pl -Increase omega 3's and alpha linoleic acid (ALA) in diet salmon 2x weekly, walnuts, almonds,flax seed, fatty fish, spinach, pieter seeds, grass feed dairy-milk,eggs, ignacio lettuce, green beans, shrimp, strawberries and raspberries  -Encourage healthy diet Back Pain Exercises -start in 1-2 weeks if back pain improved  Exercises that stretch and strengthen the muscles of your abdomen and spine can help prevent back problems.  If your back and abdominal muscles are strong, you can maintain good posture and keep Pelvic tilt: Lie on your back with your knees bent and your feet flat on the floor. Tighten your abdominal muscles and push your lower back into the floor. Hold this position for 5 seconds, then relax. Do 3 sets of 10.    Partial curl: Lie on your back with It is best to avoid the following exercises because they strain the lower back:   legs raised straight and together   sit-ups with legs straight   hip twists   toe touches   any backward arching.    Sports and other activities   In addition to conditioning 3. Limit sugar  · Pause before you add sugars to pancakes, cereal, coffee, or tea. This includes white and brown table sugar, syrup, honey, and molasses. Cut your usual amount by half. · Use non-sugar sweeteners.  Stevia, aspartame, and sucralose can satis Fiction: “Skipping meals will help me lose weight.”  Fact: When you skip meals, you don’t give your body the energy it needs to work. Hunger makes you more likely to overeat later on. It’s best to spread your meals throughout the day.  Eat at least three me Food is your body’s fuel. You can’t live without it. The key is to give your body enough nutrients and energy without eating too much. Reading food labels can help you make healthy choices. Also, learn new eating habits to manage your weight.      All the v © 8820-5616 The [address] [place] [address], [address] [place]. All rights reserved. This information is not intended as a substitute for professional medical care. Always follow your healthcare professional's instructions.         Losing Brisk activity gets your heart pumping faster and it makes it healthier. It’s also a great way to burn calories. In fact, your body may keep burning calories for hours after you stop a brisk activity:  · Begin by walking 10 minutes most days.   · Add more t · Steamed corn tortillas without butter or salt  · Chicken or fish fajita  · Arroz con brent (chicken and rice)  · Chicken or whole-bean burrito or soft taco—without cheese, sour cream, or guacamole · Minestrone soup  · Pasta with tomato sauce or sautéed v Foods to avoid  · Donuts, muffins, and pastries  · Coconut, vegetables with butter, cream, or cheese sauce  · Cream, whole milk, and powdered creamers  · Baldev Banana, liver, luncheon meats, ground meat, and canned fish in oil  · Sweets and foods made with butter Take 1 tablet (200 mcg total) by mouth once daily.    Commonly known as:  SYNTHROID           methylPREDNISolone 4 MG Tabs   Dose as generic Medrol Dosepak Take as directed with food and water   Commonly known as:  MEDROL           Montelukast Sodium 10 MG [email or web address]

## (undated) NOTE — LETTER
8/23/2018              [address] [place] [address]         Dear Annemarie Feliciano,      The report of the Biopsy done on 08/20/18 shows a Neurofibroma. This is a benign (not cancerous) growth, and requires no further treatment.

## (undated) NOTE — LETTER
02/15/19        [address] [facility]      Dear Meghan Montenegro records indicate that you have outstanding lab work and or testing that was ordered for you and has not yet been completed:  Orders Placed This Encounter      T

## (undated) NOTE — LETTER
ASTHMA ACTION PLAN for Melyssa Hinds     : 1975     Date: 19  Provider:  Authur Gosselin, DO  Phone for doctor or clinic: [address] [facility] [address] [phone]      ACT Score: 23     You can use Signatures:  Provider  Michela Sever, DO   Patient Caretaker